# Patient Record
Sex: FEMALE | Race: WHITE | NOT HISPANIC OR LATINO | Employment: OTHER | URBAN - METROPOLITAN AREA
[De-identification: names, ages, dates, MRNs, and addresses within clinical notes are randomized per-mention and may not be internally consistent; named-entity substitution may affect disease eponyms.]

---

## 2017-03-01 ENCOUNTER — ALLSCRIPTS OFFICE VISIT (OUTPATIENT)
Dept: OTHER | Facility: OTHER | Age: 67
End: 2017-03-01

## 2017-05-06 ENCOUNTER — APPOINTMENT (OUTPATIENT)
Dept: LAB | Facility: CLINIC | Age: 67
End: 2017-05-06
Payer: MEDICARE

## 2017-05-06 ENCOUNTER — TRANSCRIBE ORDERS (OUTPATIENT)
Dept: LAB | Facility: CLINIC | Age: 67
End: 2017-05-06

## 2017-05-06 DIAGNOSIS — E03.9 HYPOTHYROIDISM: ICD-10-CM

## 2017-05-06 LAB
ALBUMIN SERPL BCP-MCNC: 3.8 G/DL (ref 3.5–5)
ALP SERPL-CCNC: 48 U/L (ref 46–116)
ALT SERPL W P-5'-P-CCNC: 32 U/L (ref 12–78)
ANION GAP SERPL CALCULATED.3IONS-SCNC: 8 MMOL/L (ref 4–13)
AST SERPL W P-5'-P-CCNC: 16 U/L (ref 5–45)
BILIRUB SERPL-MCNC: 0.5 MG/DL (ref 0.2–1)
BUN SERPL-MCNC: 18 MG/DL (ref 5–25)
CALCIUM SERPL-MCNC: 8.9 MG/DL (ref 8.3–10.1)
CHLORIDE SERPL-SCNC: 105 MMOL/L (ref 100–108)
CHOLEST SERPL-MCNC: 190 MG/DL (ref 50–200)
CO2 SERPL-SCNC: 30 MMOL/L (ref 21–32)
CREAT SERPL-MCNC: 0.75 MG/DL (ref 0.6–1.3)
CREAT UR-MCNC: 103 MG/DL
ERYTHROCYTE [DISTWIDTH] IN BLOOD BY AUTOMATED COUNT: 13.3 % (ref 11.6–15.1)
GFR SERPL CREATININE-BSD FRML MDRD: >60 ML/MIN/1.73SQ M
GLUCOSE P FAST SERPL-MCNC: 90 MG/DL (ref 65–99)
HCT VFR BLD AUTO: 42.9 % (ref 34.8–46.1)
HDLC SERPL-MCNC: 64 MG/DL (ref 40–60)
HGB BLD-MCNC: 14.4 G/DL (ref 11.5–15.4)
LDLC SERPL CALC-MCNC: 110 MG/DL (ref 0–100)
MCH RBC QN AUTO: 31.4 PG (ref 26.8–34.3)
MCHC RBC AUTO-ENTMCNC: 33.6 G/DL (ref 31.4–37.4)
MCV RBC AUTO: 94 FL (ref 82–98)
MICROALBUMIN UR-MCNC: 7.7 MG/L (ref 0–20)
MICROALBUMIN/CREAT 24H UR: 7 MG/G CREATININE (ref 0–30)
PLATELET # BLD AUTO: 249 THOUSANDS/UL (ref 149–390)
PMV BLD AUTO: 9 FL (ref 8.9–12.7)
POTASSIUM SERPL-SCNC: 4.1 MMOL/L (ref 3.5–5.3)
PROT SERPL-MCNC: 7 G/DL (ref 6.4–8.2)
RBC # BLD AUTO: 4.59 MILLION/UL (ref 3.81–5.12)
SODIUM SERPL-SCNC: 143 MMOL/L (ref 136–145)
TRIGL SERPL-MCNC: 78 MG/DL
TSH SERPL DL<=0.05 MIU/L-ACNC: 3.29 UIU/ML (ref 0.36–3.74)
WBC # BLD AUTO: 4.9 THOUSAND/UL (ref 4.31–10.16)

## 2017-05-06 PROCEDURE — 84443 ASSAY THYROID STIM HORMONE: CPT

## 2017-05-06 PROCEDURE — 36415 COLL VENOUS BLD VENIPUNCTURE: CPT

## 2017-05-06 PROCEDURE — 82043 UR ALBUMIN QUANTITATIVE: CPT

## 2017-05-06 PROCEDURE — 82570 ASSAY OF URINE CREATININE: CPT

## 2017-05-06 PROCEDURE — 80061 LIPID PANEL: CPT

## 2017-05-06 PROCEDURE — 80053 COMPREHEN METABOLIC PANEL: CPT

## 2017-05-06 PROCEDURE — 85027 COMPLETE CBC AUTOMATED: CPT

## 2017-05-09 ENCOUNTER — ALLSCRIPTS OFFICE VISIT (OUTPATIENT)
Dept: OTHER | Facility: OTHER | Age: 67
End: 2017-05-09

## 2017-06-19 ENCOUNTER — ALLSCRIPTS OFFICE VISIT (OUTPATIENT)
Dept: OTHER | Facility: OTHER | Age: 67
End: 2017-06-19

## 2017-07-28 ENCOUNTER — GENERIC CONVERSION - ENCOUNTER (OUTPATIENT)
Dept: OTHER | Facility: OTHER | Age: 67
End: 2017-07-28

## 2017-11-01 DIAGNOSIS — E03.9 HYPOTHYROIDISM: ICD-10-CM

## 2017-11-01 DIAGNOSIS — I10 ESSENTIAL (PRIMARY) HYPERTENSION: ICD-10-CM

## 2017-11-01 DIAGNOSIS — E78.5 HYPERLIPIDEMIA: ICD-10-CM

## 2018-01-09 NOTE — MISCELLANEOUS
Message  GI Reminder Recall Citlaly Mcdonnell:   Date: 07/28/2017   Dear Sánchez ROMAN:     Review of our records shows you are due for the following: CONSULT  Please call the following office to schedule your appointment:   [address] [facility], [facility] [address] [place] [address], [address] [place] [address]  [phone]  We look forward to hearing from you!      Sincerely,     [facility]      Signatures   Electronically signed by : Charlette Marquez, ; Jul 28 2017 10:27AM EST                       (Author)

## 2018-01-13 VITALS
RESPIRATION RATE: 16 BRPM | DIASTOLIC BLOOD PRESSURE: 80 MMHG | HEIGHT: 63 IN | WEIGHT: 182 LBS | SYSTOLIC BLOOD PRESSURE: 132 MMHG | HEART RATE: 72 BPM | BODY MASS INDEX: 32.25 KG/M2 | TEMPERATURE: 97.8 F

## 2018-01-14 VITALS
HEART RATE: 84 BPM | DIASTOLIC BLOOD PRESSURE: 76 MMHG | RESPIRATION RATE: 18 BRPM | BODY MASS INDEX: 32.6 KG/M2 | WEIGHT: 184 LBS | HEIGHT: 63 IN | TEMPERATURE: 98.1 F | SYSTOLIC BLOOD PRESSURE: 128 MMHG

## 2018-01-14 VITALS
BODY MASS INDEX: 32.78 KG/M2 | RESPIRATION RATE: 16 BRPM | OXYGEN SATURATION: 98 % | HEART RATE: 86 BPM | DIASTOLIC BLOOD PRESSURE: 70 MMHG | HEIGHT: 63 IN | WEIGHT: 185 LBS | SYSTOLIC BLOOD PRESSURE: 120 MMHG

## 2018-01-15 NOTE — RESULT NOTES
Verified Results  (1) TSH [id number] 07:29AM Subhash Guerrero Order Number: [id number]     Test Name Result Flag Reference   TSH 2 267 uIU/mL  0 358-3 740   - Patient Instructions: This bloodwork is non-fasting  Please drink two glasses of water morning of bloodwork  - Patient Instructions: This bloodwork is non-fasting  Please drink two glasses of water morning of bloodwork  Patients undergoing fluorescein dye angiography may retain small amounts of fluorescein in the body for 48-72 hours post procedure  Samples containing fluorescein can produce falsely depressed TSH values  If the patient had this procedure,a specimen should be resubmitted post fluorescein clearance            The recommended reference ranges for TSH during pregnancy are as follows:  First trimester 0 1 to 2 5 uIU/mL  Second trimester  0 2 to 3 0 uIU/mL  Third trimester 0 3 to 3 0 uIU/m       Discussion/Summary   Normal lab

## 2018-01-15 NOTE — RESULT NOTES
Verified Results  (1) CBC/ PLT (NO DIFF) [id number] 07:17AM Alison Singerh     Test Name Result Flag Reference   HEMATOCRIT 42 5 %  34 8-46 1   HEMOGLOBIN 14 6 g/dL  11 5-15 4   MCHC 34 4 g/dL  31 4-37 4   MCH 32 4 pg  26 8-34 3   MCV 94 fL  82-98   PLATELET COUNT 326 Thousands/uL  149-390   RBC COUNT 4 50 Million/uL  3 81-5 12   RDW 13 2 %  11 6-15 1   WBC COUNT 4 49 Thousand/uL  4 31-10 16   MPV 8 7 fL L 8 9-12 7     (1) COMPREHENSIVE METABOLIC PANEL [id number] 19:06AT Alison Singerh     Test Name Result Flag Reference   GLUCOSE,RANDM 88 mg/dL     If the patient is fasting, the ADA then defines impaired fasting glucose as > 100 mg/dL and diabetes as > or equal to 123 mg/dL  SODIUM 143 mmol/L  136-145   POTASSIUM 3 8 mmol/L  3 5-5 3   CHLORIDE 104 mmol/L  100-108   CARBON DIOXIDE 32 mmol/L  21-32   ANION GAP (CALC) 7 mmol/L  4-13   BLOOD UREA NITROGEN 13 mg/dL  5-25   CREATININE 0 95 mg/dL  0 60-1 30   Standardized to IDMS reference method   CALCIUM 9 3 mg/dL  8 3-10 1   BILI, TOTAL 0 50 mg/dL  0 20-1 00   ALK PHOSPHATAS 50 U/L     ALT (SGPT) 39 U/L  12-78   AST(SGOT) 22 U/L  5-45   ALBUMIN 3 9 g/dL  3 5-5 0   TOTAL PROTEIN 7 0 g/dL  6 4-8 2   eGFR Non-African American 59 0 ml/min/1 73sq [facility] Disease Education Program recommendations are as follows:  GFR calculation is accurate only with a steady state creatinine  Chronic Kidney disease less than 60 ml/min/1 73 sq  meters  Kidney failure less than 15 ml/min/1 73 sq  meters  (1) LIPID PANEL, FASTING [id number] 07:17AM Alison Singerh     Test Name Result Flag Reference   CHOLESTEROL 207 mg/dL H    HDL,DIRECT 74 mg/dL H 40-60   Specimen collection should occur prior to Metamizole administration due to the potential for falsely depressed results     LDL CHOLESTEROL CALCULATED 112 mg/dL H 0-100   Triglyceride:         Normal              <150 mg/dl       Borderline High    150-199 mg/dl       High               200-499 mg/dl       Very High          >499 mg/dl  Cholesterol:         Desirable        <200 mg/dl      Borderline High  200-239 mg/dl      High             >239 mg/dl  HDL Cholesterol:        High    >59 mg/dL      Low     <41 mg/dL  LDL CALCULATED:    This screening LDL is a calculated result  It does not have the accuracy of the Direct Measured LDL in the monitoring of patients with hyperlipidemia and/or statin therapy  Direct Measure LDL (HWQ031) must be ordered separately in these patients  TRIGLYCERIDES 105 mg/dL  <=150   Specimen collection should occur prior to N-Acetylcysteine or Metamizole administration due to the potential for falsely depressed results  (1) TSH [id number] 07:17AM Dorian Greco     Test Name Result Flag Reference   TSH 4 656 uIU/mL H 0 358-3 740   Patients undergoing fluorescein dye angiography may retain small amounts of fluorescein in the body for 48-72 hours post procedure  Samples containing fluorescein can produce falsely depressed TSH values  If the patient had this procedure,a specimen should be resubmitted post fluorescein clearance  The recommended reference ranges for TSH during pregnancy are as follows:  First trimester 0 1 to 2 5 uIU/mL  Second trimester  0 2 to 3 0 uIU/mL  Third trimester 0 3 to 3 0 uIU/m     (1) VITAMIN D 25-HYDROXY [id number] 07:17AM Carina Lafleur     Test Name Result Flag Reference   VIT D 25-HYDROX 37 5 ng/mL  30 0-100 0   This assay is a certified procedure of the CDC Vitamin D Standardization Certification Program (VDSCP)     Deficiency <20ng/ml   Insufficiency 20-30ng/ml   Sufficient  ng/ml     *Patients undergoing fluorescein dye angiography may retain small amounts of fluorescein in the body for 48-72 hours post procedure  Samples containing fluorescein can produce falsely elevated Vitamin D values  If the patient had this procedure, a specimen should be resubmitted post fluorescein clearance

## 2018-01-17 NOTE — RESULT NOTES
Verified Results  * DXA BONE DENSITY SPINE HIP AND PELVIS [id number] 11:32AM Armani Verma     Test Name Result Flag Reference   DXA BONE DENSITY SPINE HIP AND PELVIS (Report)     CENTRAL DXA SCAN     CLINICAL HISTORY: 60-year-old woman  Menopause at age 64  OTHER RISK FACTORS: History of ankle fracture following a low level of injury  TECHNIQUE: Bone densitometry was performed using a [email or web address] bone densitometer  Regions of interest appear properly placed  There are no prior DXA studies performed on this unit for comparison  COMPARISON: None     RESULTS:      LUMBAR SPINE L1-L4 (L3): BMD 1 163 gm/cm2 / T-score -0 2 / Z score 1 4     LEFT TOTAL HIP: BMD 1 052 gm/cm2 / T-score 0 4 / Z score 1 6   LEFT FEMORAL NECK: BMD 0 914 gm/cm2 / T score -0 9 / Z score 0 6     RIGHT TOTAL HIP: BMD 1 091 gm/cm2 / T-score 0 7 / Z score 1 9   RIGHT FEMORAL NECK: BMD 0 905 gm/cm2 / T score -1 0 / Z score 0 5     ASSESSMENT:   1  Normal bone mineral density  2  A daily intake of at least 1200 mg calcium and vitamin D 800- 1000 IU, as well as weight bearing and muscle strengthening exercise, fall prevention and avoidance of tobacco and excessive alcohol as preventive measurements are suggested  3   Follow-up DXA in two years is recommended for most patients  A one year follow-up DXA is recommended after initiation or change in therapy for osteoporosis  More frequent evaluation is also appropriate for patients with conditions associated with    rapid bone loss, such as glucocorticoid therapy         WHO CLASSIFICATION:   Normal (a T-score of -1 0 or higher)   Low bone mineral density (a T-score of less than -1 0 but higher than -2 5)   Osteoporosis (a T-score of -2 5 or less)   Severe osteoporosis (a T-score of -2 5 or less with a fragility fracture)       Workstation performed: [id number]       Discussion/Summary   normal dexa

## 2018-05-31 ENCOUNTER — OFFICE VISIT (OUTPATIENT)
Dept: FAMILY MEDICINE CLINIC | Facility: CLINIC | Age: 68
End: 2018-05-31
Payer: MEDICARE

## 2018-05-31 VITALS
RESPIRATION RATE: 18 BRPM | HEART RATE: 94 BPM | TEMPERATURE: 98.6 F | HEIGHT: 63 IN | SYSTOLIC BLOOD PRESSURE: 138 MMHG | OXYGEN SATURATION: 97 % | DIASTOLIC BLOOD PRESSURE: 82 MMHG | WEIGHT: 187 LBS | BODY MASS INDEX: 33.13 KG/M2

## 2018-05-31 DIAGNOSIS — J06.9 ACUTE UPPER RESPIRATORY INFECTION: ICD-10-CM

## 2018-05-31 DIAGNOSIS — J06.9 VIRAL UPPER RESPIRATORY TRACT INFECTION: Primary | ICD-10-CM

## 2018-05-31 DIAGNOSIS — E03.9 HYPOTHYROIDISM, UNSPECIFIED TYPE: ICD-10-CM

## 2018-05-31 PROCEDURE — 99214 OFFICE O/P EST MOD 30 MIN: CPT | Performed by: FAMILY MEDICINE

## 2018-05-31 RX ORDER — CODEINE PHOSPHATE AND GUAIFENESIN 10; 100 MG/5ML; MG/5ML
SOLUTION ORAL
COMMUNITY
Start: 2017-06-19 | End: 2018-05-31 | Stop reason: ALTCHOICE

## 2018-05-31 RX ORDER — AMOXICILLIN 500 MG/1
500 CAPSULE ORAL EVERY 8 HOURS SCHEDULED
Qty: 21 CAPSULE | Refills: 0 | Status: SHIPPED | OUTPATIENT
Start: 2018-05-31 | End: 2018-06-07

## 2018-05-31 RX ORDER — FLUTICASONE PROPIONATE 50 MCG
1 SPRAY, SUSPENSION (ML) NASAL 2 TIMES DAILY
COMMUNITY
Start: 2017-03-01 | End: 2018-05-31 | Stop reason: ALTCHOICE

## 2018-05-31 RX ORDER — MELATONIN
COMMUNITY
End: 2018-05-31 | Stop reason: ALTCHOICE

## 2018-05-31 RX ORDER — HYDROCHLOROTHIAZIDE 25 MG/1
1 TABLET ORAL DAILY
COMMUNITY
Start: 2015-10-15 | End: 2018-05-31 | Stop reason: ALTCHOICE

## 2018-05-31 RX ORDER — B-COMPLEX WITH VITAMIN C
TABLET ORAL
COMMUNITY
End: 2018-05-31 | Stop reason: ALTCHOICE

## 2018-05-31 RX ORDER — SIMVASTATIN 20 MG
1 TABLET ORAL DAILY
COMMUNITY
Start: 2012-10-12 | End: 2018-05-31 | Stop reason: ALTCHOICE

## 2018-05-31 RX ORDER — CODEINE PHOSPHATE AND GUAIFENESIN 10; 100 MG/5ML; MG/5ML
5 SOLUTION ORAL 3 TIMES DAILY PRN
Qty: 118 ML | Refills: 0 | Status: SHIPPED | OUTPATIENT
Start: 2018-05-31 | End: 2019-02-04

## 2018-05-31 RX ORDER — LEVOTHYROXINE SODIUM 0.05 MG/1
TABLET ORAL
COMMUNITY
Start: 2016-09-26 | End: 2018-05-31 | Stop reason: ALTCHOICE

## 2018-05-31 NOTE — ASSESSMENT & PLAN NOTE
She did not go for labs and she was not given the refills, she will do her blood work and then will restart her on medication advised to have follow-up in 1-2 weeks after the labs

## 2018-05-31 NOTE — ASSESSMENT & PLAN NOTE
Most likely viral URI but has her cough is getting worse and she is feeling very fatigue I will start her on amoxicillin and cough medicine as needed

## 2018-05-31 NOTE — PROGRESS NOTES
Assessment/Plan:    Problem List Items Addressed This Visit        Endocrine    Hypothyroidism     She did not go for labs and she was not given the refills, she will do her blood work and then will restart her on medication advised to have follow-up in 1-2 weeks after the labs         Relevant Orders    CBC and differential    Comprehensive metabolic panel    Lipid panel    TSH, 3rd generation       Respiratory    Acute upper respiratory infection     Most likely viral URI but has her cough is getting worse and she is feeling very fatigue I will start her on amoxicillin and cough medicine as needed         Relevant Medications    amoxicillin (AMOXIL) 500 mg capsule      Other Visit Diagnoses     Viral upper respiratory tract infection    -  Primary    Relevant Medications    guaiFENesin-codeine (ROBITUSSIN AC) 100-10 mg/5 mL oral solution    amoxicillin (AMOXIL) 500 mg capsule          Chief Complaint   Patient presents with    Cough    Nasal Congestion    Follow-up       Subjective:   Patient ID: Paul Gustafson is a 79 y o  female  She complain of sore throat nasal congestion and cough for last 5 days and she says sore throat is better but the cough is not getting better she has no phlegm or sometimes clear phlegm and her nasal secretions are clear  She denies fever but she has lot of sweats  No breathing difficulty  And denies any nausea vomiting or diarrhea  She has decreased appetite and fluid intake and she has been feeling tired  She says she stopped all her medication because they were not refilled she is out of thyroid medicine for last 1 week  She says she did not go for blood work since last year  Review of Systems   Constitutional: Negative for activity change, appetite change, chills, diaphoresis, fatigue, fever and unexpected weight change  HENT: Positive for congestion and rhinorrhea   Negative for dental problem, ear discharge, ear pain, facial swelling, hearing loss, mouth sores, nosebleeds, postnasal drip, sinus pain, sinus pressure, sneezing, sore throat, trouble swallowing and voice change  Eyes: Negative for photophobia, pain, discharge, redness and itching  Respiratory: Negative for cough, chest tightness, shortness of breath and wheezing  Cardiovascular: Negative for chest pain, palpitations and leg swelling  Gastrointestinal: Negative for abdominal distention, abdominal pain, blood in stool, constipation, diarrhea and nausea  Endocrine: Negative for cold intolerance, heat intolerance, polydipsia, polyphagia and polyuria  Genitourinary: Negative for dysuria, flank pain, frequency, hematuria and urgency  Musculoskeletal: Negative for arthralgias, back pain, myalgias and neck pain  Skin: Negative for color change and pallor  Allergic/Immunologic: Negative for environmental allergies and food allergies  Neurological: Negative for dizziness, weakness, light-headedness, numbness and headaches  Hematological: Negative for adenopathy  Does not bruise/bleed easily  Psychiatric/Behavioral: Negative for behavioral problems, sleep disturbance and suicidal ideas  The patient is not nervous/anxious  Objective:  Physical Exam   Constitutional: She appears well-developed and well-nourished  HENT:   Head: Normocephalic and atraumatic  Right Ear: External ear normal    Left Ear: External ear normal    Mouth/Throat: Oropharynx is clear and moist    Eyes: Conjunctivae and EOM are normal  Pupils are equal, round, and reactive to light  No scleral icterus  Neck: Normal range of motion  Neck supple  No thyromegaly present  Cardiovascular: Normal rate, regular rhythm and normal heart sounds  Pulmonary/Chest: Effort normal and breath sounds normal  She has no wheezes  She has no rales  Lymphadenopathy:     She has no cervical adenopathy  Neurological: She is alert  Skin: No rash noted  No erythema  Psychiatric: She has a normal mood and affect  Nursing note and vitals reviewed           Current Outpatient Prescriptions:     amoxicillin (AMOXIL) 500 mg capsule, Take 1 capsule (500 mg total) by mouth every 8 (eight) hours for 7 days, Disp: 21 capsule, Rfl: 0    guaiFENesin-codeine (ROBITUSSIN AC) 100-10 mg/5 mL oral solution, Take 5 mL by mouth 3 (three) times a day as needed for cough, Disp: 118 mL, Rfl: 0    No Known Allergies    Vitals:    05/31/18 1430   BP: 138/82   Pulse: 94   Resp: 18   Temp: 98 6 °F (37 °C)   SpO2: 97%   Weight: 84 8 kg (187 lb)   Height: 5' 3" (1 6 m)

## 2018-07-11 ENCOUNTER — LAB (OUTPATIENT)
Dept: LAB | Facility: CLINIC | Age: 68
End: 2018-07-11
Payer: MEDICARE

## 2018-07-11 DIAGNOSIS — E03.9 HYPOTHYROIDISM, UNSPECIFIED TYPE: ICD-10-CM

## 2018-07-11 LAB
ALBUMIN SERPL BCP-MCNC: 4 G/DL (ref 3.5–5)
ALP SERPL-CCNC: 63 U/L (ref 46–116)
ALT SERPL W P-5'-P-CCNC: 34 U/L (ref 12–78)
ANION GAP SERPL CALCULATED.3IONS-SCNC: 7 MMOL/L (ref 4–13)
AST SERPL W P-5'-P-CCNC: 17 U/L (ref 5–45)
BASOPHILS # BLD AUTO: 0.01 THOUSANDS/ΜL (ref 0–0.1)
BASOPHILS NFR BLD AUTO: 0 % (ref 0–1)
BILIRUB SERPL-MCNC: 0.7 MG/DL (ref 0.2–1)
BUN SERPL-MCNC: 15 MG/DL (ref 5–25)
CALCIUM SERPL-MCNC: 9.1 MG/DL (ref 8.3–10.1)
CHLORIDE SERPL-SCNC: 105 MMOL/L (ref 100–108)
CHOLEST SERPL-MCNC: 293 MG/DL (ref 50–200)
CO2 SERPL-SCNC: 28 MMOL/L (ref 21–32)
CREAT SERPL-MCNC: 0.85 MG/DL (ref 0.6–1.3)
EOSINOPHIL # BLD AUTO: 0.15 THOUSAND/ΜL (ref 0–0.61)
EOSINOPHIL NFR BLD AUTO: 4 % (ref 0–6)
ERYTHROCYTE [DISTWIDTH] IN BLOOD BY AUTOMATED COUNT: 13.1 % (ref 11.6–15.1)
GFR SERPL CREATININE-BSD FRML MDRD: 71 ML/MIN/1.73SQ M
GLUCOSE P FAST SERPL-MCNC: 91 MG/DL (ref 65–99)
HCT VFR BLD AUTO: 44.8 % (ref 34.8–46.1)
HDLC SERPL-MCNC: 67 MG/DL (ref 40–60)
HGB BLD-MCNC: 15.4 G/DL (ref 11.5–15.4)
LDLC SERPL CALC-MCNC: 198 MG/DL (ref 0–100)
LYMPHOCYTES # BLD AUTO: 1.32 THOUSANDS/ΜL (ref 0.6–4.47)
LYMPHOCYTES NFR BLD AUTO: 33 % (ref 14–44)
MCH RBC QN AUTO: 32.4 PG (ref 26.8–34.3)
MCHC RBC AUTO-ENTMCNC: 34.4 G/DL (ref 31.4–37.4)
MCV RBC AUTO: 94 FL (ref 82–98)
MONOCYTES # BLD AUTO: 0.43 THOUSAND/ΜL (ref 0.17–1.22)
MONOCYTES NFR BLD AUTO: 11 % (ref 4–12)
NEUTROPHILS # BLD AUTO: 2.12 THOUSANDS/ΜL (ref 1.85–7.62)
NEUTS SEG NFR BLD AUTO: 52 % (ref 43–75)
NONHDLC SERPL-MCNC: 226 MG/DL
PLATELET # BLD AUTO: 252 THOUSANDS/UL (ref 149–390)
PMV BLD AUTO: 9.2 FL (ref 8.9–12.7)
POTASSIUM SERPL-SCNC: 4.1 MMOL/L (ref 3.5–5.3)
PROT SERPL-MCNC: 7.4 G/DL (ref 6.4–8.2)
RBC # BLD AUTO: 4.75 MILLION/UL (ref 3.81–5.12)
SODIUM SERPL-SCNC: 140 MMOL/L (ref 136–145)
TRIGL SERPL-MCNC: 141 MG/DL
TSH SERPL DL<=0.05 MIU/L-ACNC: 4.75 UIU/ML (ref 0.36–3.74)
WBC # BLD AUTO: 4.03 THOUSAND/UL (ref 4.31–10.16)

## 2018-07-11 PROCEDURE — 80053 COMPREHEN METABOLIC PANEL: CPT

## 2018-07-11 PROCEDURE — 84443 ASSAY THYROID STIM HORMONE: CPT

## 2018-07-11 PROCEDURE — 36415 COLL VENOUS BLD VENIPUNCTURE: CPT

## 2018-07-11 PROCEDURE — 80061 LIPID PANEL: CPT

## 2018-07-11 PROCEDURE — 85025 COMPLETE CBC W/AUTO DIFF WBC: CPT

## 2018-07-12 ENCOUNTER — TELEPHONE (OUTPATIENT)
Dept: FAMILY MEDICINE CLINIC | Facility: CLINIC | Age: 68
End: 2018-07-12

## 2018-07-12 DIAGNOSIS — E78.5 HYPERLIPIDEMIA, UNSPECIFIED HYPERLIPIDEMIA TYPE: ICD-10-CM

## 2018-07-12 DIAGNOSIS — E03.9 HYPOTHYROIDISM, UNSPECIFIED TYPE: Primary | ICD-10-CM

## 2018-07-12 RX ORDER — LEVOTHYROXINE SODIUM 0.03 MG/1
25 TABLET ORAL DAILY
Qty: 90 TABLET | Refills: 1 | Status: SHIPPED | OUTPATIENT
Start: 2018-07-12 | End: 2018-07-16 | Stop reason: SDUPTHER

## 2018-07-12 RX ORDER — SIMVASTATIN 40 MG
40 TABLET ORAL
Qty: 90 TABLET | Refills: 1 | Status: SHIPPED | OUTPATIENT
Start: 2018-07-12 | End: 2019-02-04 | Stop reason: SDUPTHER

## 2018-07-12 NOTE — TELEPHONE ENCOUNTER
Please advise along with blood word info because I dont think she called back in regards to this yet

## 2018-07-12 NOTE — Clinical Note
I spoke with the patient and I will restart her on levothyroxine and simvastatin and I printed a prescription [facility], And I ordered labs for 3 months please mail to her,

## 2018-07-12 NOTE — TELEPHONE ENCOUNTER
Discussed the result with the patient and restarted her levothyroxine and simvastatin, I increased the simvastatin dose as LDL is very high and levothyroxine I will give her 25 microgram and order new labs for 3 months

## 2018-07-12 NOTE — TELEPHONE ENCOUNTER
Patient calling said that she needs for Dr Dafne Robb to call her  I asked her what it was in reference to  She said that its about medication  I asked what medication and she said that is the problem she hasn't discussed the options with her yet  I advised I would send Dr Dafne Robb a message

## 2018-07-12 NOTE — PROGRESS NOTES
Spoke with the patient and advised to start her medicine soon she is not taking it for last 6 months    Advised low-fat diet and exercise and lose weight

## 2018-07-16 DIAGNOSIS — E03.9 HYPOTHYROIDISM, UNSPECIFIED TYPE: ICD-10-CM

## 2018-07-16 RX ORDER — LEVOTHYROXINE SODIUM 0.03 MG/1
25 TABLET ORAL DAILY
Qty: 90 TABLET | Refills: 0 | Status: SHIPPED | OUTPATIENT
Start: 2018-07-16 | End: 2019-02-04 | Stop reason: SDUPTHER

## 2019-01-15 ENCOUNTER — TELEPHONE (OUTPATIENT)
Dept: FAMILY MEDICINE CLINIC | Facility: CLINIC | Age: 69
End: 2019-01-15

## 2019-01-15 NOTE — TELEPHONE ENCOUNTER
Patients pharmacy called for refills of patients Simvastatin  And Levothyroxine  Patient was contacted and advised that she is in need of an office visit the last time she was seen was 05/2017  Patient advised she was aware but did not want to schedule an appointment because her  recently had surgery and she has to drive him everywhere  I did advise her then that we could not give her 90 days and with that she began to get loud and stated that's fine I will go without my medication again and abruptly hung up the phone  I was going to explain that we could do 30 days until her she could find a good time to come in and follow up

## 2019-02-04 ENCOUNTER — OFFICE VISIT (OUTPATIENT)
Dept: FAMILY MEDICINE CLINIC | Facility: CLINIC | Age: 69
End: 2019-02-04
Payer: MEDICARE

## 2019-02-04 VITALS
WEIGHT: 191 LBS | OXYGEN SATURATION: 98 % | RESPIRATION RATE: 16 BRPM | HEART RATE: 66 BPM | SYSTOLIC BLOOD PRESSURE: 150 MMHG | HEIGHT: 63 IN | DIASTOLIC BLOOD PRESSURE: 100 MMHG | BODY MASS INDEX: 33.84 KG/M2

## 2019-02-04 DIAGNOSIS — Z13.6 SCREENING FOR CARDIOVASCULAR CONDITION: ICD-10-CM

## 2019-02-04 DIAGNOSIS — I10 BENIGN ESSENTIAL HYPERTENSION: ICD-10-CM

## 2019-02-04 DIAGNOSIS — Z12.31 ENCOUNTER FOR SCREENING MAMMOGRAM FOR BREAST CANCER: ICD-10-CM

## 2019-02-04 DIAGNOSIS — E78.5 HYPERLIPIDEMIA, UNSPECIFIED HYPERLIPIDEMIA TYPE: ICD-10-CM

## 2019-02-04 DIAGNOSIS — Z12.11 SCREENING FOR COLON CANCER: Primary | ICD-10-CM

## 2019-02-04 DIAGNOSIS — E78.2 MIXED HYPERLIPIDEMIA: ICD-10-CM

## 2019-02-04 DIAGNOSIS — E03.9 HYPOTHYROIDISM, UNSPECIFIED TYPE: ICD-10-CM

## 2019-02-04 PROBLEM — J06.9 ACUTE UPPER RESPIRATORY INFECTION: Status: RESOLVED | Noted: 2017-06-19 | Resolved: 2019-02-04

## 2019-02-04 PROCEDURE — 99214 OFFICE O/P EST MOD 30 MIN: CPT | Performed by: FAMILY MEDICINE

## 2019-02-04 RX ORDER — LEVOTHYROXINE SODIUM 0.03 MG/1
25 TABLET ORAL DAILY
Qty: 90 TABLET | Refills: 0 | Status: SHIPPED | OUTPATIENT
Start: 2019-02-04 | End: 2019-02-04 | Stop reason: SDUPTHER

## 2019-02-04 RX ORDER — LEVOTHYROXINE SODIUM 0.03 MG/1
25 TABLET ORAL DAILY
Qty: 90 TABLET | Refills: 1 | Status: SHIPPED | OUTPATIENT
Start: 2019-02-04 | End: 2019-11-26 | Stop reason: SDUPTHER

## 2019-02-04 RX ORDER — SIMVASTATIN 40 MG
40 TABLET ORAL
Qty: 90 TABLET | Refills: 1 | Status: SHIPPED | OUTPATIENT
Start: 2019-02-04 | End: 2019-11-26 | Stop reason: SDUPTHER

## 2019-02-04 RX ORDER — SIMVASTATIN 40 MG
40 TABLET ORAL
Qty: 90 TABLET | Refills: 1 | Status: SHIPPED | OUTPATIENT
Start: 2019-02-04 | End: 2019-02-04 | Stop reason: SDUPTHER

## 2019-02-04 NOTE — ASSESSMENT & PLAN NOTE
Recheck blood pressure is high today, she does not check at home advised to come back in a week to be recheck as her initial blood pressure was not high when she came in

## 2019-02-04 NOTE — PROGRESS NOTES
Assessment/Plan:    Problem List Items Addressed This Visit        Endocrine    Hypothyroidism     She is due for labs, advised to have labs and follow-up in a week and refill of medicine given         Relevant Medications    levothyroxine 25 mcg tablet    Other Relevant Orders    TSH, 3rd generation       Cardiovascular and Mediastinum    Benign essential hypertension     Recheck blood pressure is high today, she does not check at home advised to come back in a week to be recheck as her initial blood pressure was not high when she came in            Other    Hyperlipidemia     She is due for labs, continue statins and labs ordered, she will follow up in a week         Relevant Medications    simvastatin (ZOCOR) 40 mg tablet    Other Relevant Orders    CBC and differential    Comprehensive metabolic panel    Lipid panel    Screening for cardiovascular condition - Primary     She is due for annual wellness visit               Chief Complaint   Patient presents with    Follow-up       Subjective:   Patient ID: Ruthann Bell is a 76 y o  female  She is here for follow-up, she did not have labs for long time and she does not come back routinely for regular checkups, last time was seen for URI symptoms a year ago , in the past she says her blood pressure medicine was stopped as her blood pressure used to be low, and she has not been taking it for 1 year  She is also out of medication for levothyroxine and statins for 2 weeks and she is here for refill  Overall she feels fine denies any headache chest pain shortness of breath        Review of Systems   Constitutional: Negative for activity change, appetite change, chills, diaphoresis, fatigue, fever and unexpected weight change     HENT: Negative for congestion, dental problem, ear discharge, ear pain, facial swelling, hearing loss, mouth sores, nosebleeds, postnasal drip, rhinorrhea, sinus pain, sinus pressure, sneezing, sore throat, trouble swallowing and voice change  Eyes: Negative for photophobia, pain, discharge, redness and itching  Respiratory: Negative for cough, chest tightness, shortness of breath and wheezing  Cardiovascular: Negative for chest pain, palpitations and leg swelling  Gastrointestinal: Negative for abdominal distention, abdominal pain, blood in stool, constipation, diarrhea and nausea  Endocrine: Negative for cold intolerance, heat intolerance, polydipsia, polyphagia and polyuria  Genitourinary: Negative for dysuria, flank pain, frequency, hematuria and urgency  Musculoskeletal: Negative for arthralgias, back pain, myalgias and neck pain  Skin: Negative for color change and pallor  Allergic/Immunologic: Negative for environmental allergies and food allergies  Neurological: Negative for dizziness, weakness, light-headedness, numbness and headaches  Hematological: Negative for adenopathy  Does not bruise/bleed easily  Psychiatric/Behavioral: Negative for behavioral problems, sleep disturbance and suicidal ideas  The patient is not nervous/anxious  Objective:  Physical Exam   Constitutional: She is oriented to person, place, and time  She appears well-developed and well-nourished  HENT:   Head: Normocephalic and atraumatic  Right Ear: External ear normal    Left Ear: External ear normal    Nose: Nose normal    Mouth/Throat: Oropharynx is clear and moist  No oropharyngeal exudate  Eyes: Pupils are equal, round, and reactive to light  Conjunctivae are normal  Right eye exhibits no discharge  Left eye exhibits no discharge  No scleral icterus  Neck: Normal range of motion  Neck supple  No tracheal deviation present  No thyromegaly present  Cardiovascular: Normal rate, regular rhythm and normal heart sounds  No murmur heard  Pulmonary/Chest: Effort normal and breath sounds normal  No respiratory distress  She has no wheezes  She has no rales  Abdominal: Soft   Bowel sounds are normal  She exhibits no distension and no mass  There is no tenderness  There is no rebound  Musculoskeletal: Normal range of motion  She exhibits no edema  Lymphadenopathy:     She has no cervical adenopathy  Neurological: She is alert and oriented to person, place, and time  She has normal reflexes  No cranial nerve deficit  Skin: Skin is warm  No rash noted  No erythema  No pallor  Psychiatric: She has a normal mood and affect  Her behavior is normal  Judgment and thought content normal    Nursing note and vitals reviewed  No past surgical history on file  No family history on file        Current Outpatient Prescriptions:     levothyroxine 25 mcg tablet, Take 1 tablet (25 mcg total) by mouth daily, Disp: 90 tablet, Rfl: 1    simvastatin (ZOCOR) 40 mg tablet, Take 1 tablet (40 mg total) by mouth daily at bedtime, Disp: 90 tablet, Rfl: 1    No Known Allergies    Vitals:    02/04/19 1457 02/04/19 1519   BP: 138/86 150/100   Pulse: 66    Resp: 16    SpO2: 98%    Weight: 86 6 kg (191 lb)    Height: 5' 3" (1 6 m)

## 2019-02-04 NOTE — Clinical Note
Her prescriptions for mail pharmacy are not able to go through prescription, instead they are printed, please fax

## 2019-02-05 ENCOUNTER — APPOINTMENT (OUTPATIENT)
Dept: LAB | Facility: CLINIC | Age: 69
End: 2019-02-05
Payer: MEDICARE

## 2019-02-05 ENCOUNTER — TELEPHONE (OUTPATIENT)
Dept: GASTROENTEROLOGY | Facility: CLINIC | Age: 69
End: 2019-02-05

## 2019-02-05 ENCOUNTER — TELEPHONE (OUTPATIENT)
Dept: GASTROENTEROLOGY | Facility: AMBULARY SURGERY CENTER | Age: 69
End: 2019-02-05

## 2019-02-05 ENCOUNTER — TRANSCRIBE ORDERS (OUTPATIENT)
Dept: LAB | Facility: CLINIC | Age: 69
End: 2019-02-05

## 2019-02-05 DIAGNOSIS — E03.9 HYPOTHYROIDISM, UNSPECIFIED TYPE: ICD-10-CM

## 2019-02-05 DIAGNOSIS — Z12.11 COLON CANCER SCREENING: Primary | ICD-10-CM

## 2019-02-05 DIAGNOSIS — E78.2 MIXED HYPERLIPIDEMIA: ICD-10-CM

## 2019-02-05 LAB
ALBUMIN SERPL BCP-MCNC: 4 G/DL (ref 3.5–5)
ALP SERPL-CCNC: 57 U/L (ref 46–116)
ALT SERPL W P-5'-P-CCNC: 30 U/L (ref 12–78)
ANION GAP SERPL CALCULATED.3IONS-SCNC: 8 MMOL/L (ref 4–13)
AST SERPL W P-5'-P-CCNC: 14 U/L (ref 5–45)
BASOPHILS # BLD AUTO: 0.03 THOUSANDS/ΜL (ref 0–0.1)
BASOPHILS NFR BLD AUTO: 1 % (ref 0–1)
BILIRUB SERPL-MCNC: 0.7 MG/DL (ref 0.2–1)
BUN SERPL-MCNC: 19 MG/DL (ref 5–25)
CALCIUM SERPL-MCNC: 9.3 MG/DL (ref 8.3–10.1)
CHLORIDE SERPL-SCNC: 104 MMOL/L (ref 100–108)
CHOLEST SERPL-MCNC: 275 MG/DL (ref 50–200)
CO2 SERPL-SCNC: 31 MMOL/L (ref 21–32)
CREAT SERPL-MCNC: 0.78 MG/DL (ref 0.6–1.3)
EOSINOPHIL # BLD AUTO: 0.14 THOUSAND/ΜL (ref 0–0.61)
EOSINOPHIL NFR BLD AUTO: 3 % (ref 0–6)
ERYTHROCYTE [DISTWIDTH] IN BLOOD BY AUTOMATED COUNT: 12.9 % (ref 11.6–15.1)
GFR SERPL CREATININE-BSD FRML MDRD: 78 ML/MIN/1.73SQ M
GLUCOSE P FAST SERPL-MCNC: 80 MG/DL (ref 65–99)
HCT VFR BLD AUTO: 46 % (ref 34.8–46.1)
HDLC SERPL-MCNC: 63 MG/DL (ref 40–60)
HGB BLD-MCNC: 15 G/DL (ref 11.5–15.4)
IMM GRANULOCYTES # BLD AUTO: 0.01 THOUSAND/UL (ref 0–0.2)
IMM GRANULOCYTES NFR BLD AUTO: 0 % (ref 0–2)
LDLC SERPL CALC-MCNC: 193 MG/DL (ref 0–100)
LYMPHOCYTES # BLD AUTO: 1.48 THOUSANDS/ΜL (ref 0.6–4.47)
LYMPHOCYTES NFR BLD AUTO: 30 % (ref 14–44)
MCH RBC QN AUTO: 31.1 PG (ref 26.8–34.3)
MCHC RBC AUTO-ENTMCNC: 32.6 G/DL (ref 31.4–37.4)
MCV RBC AUTO: 95 FL (ref 82–98)
MONOCYTES # BLD AUTO: 0.5 THOUSAND/ΜL (ref 0.17–1.22)
MONOCYTES NFR BLD AUTO: 10 % (ref 4–12)
NEUTROPHILS # BLD AUTO: 2.71 THOUSANDS/ΜL (ref 1.85–7.62)
NEUTS SEG NFR BLD AUTO: 56 % (ref 43–75)
NONHDLC SERPL-MCNC: 212 MG/DL
NRBC BLD AUTO-RTO: 0 /100 WBCS
PLATELET # BLD AUTO: 265 THOUSANDS/UL (ref 149–390)
PMV BLD AUTO: 9 FL (ref 8.9–12.7)
POTASSIUM SERPL-SCNC: 4.5 MMOL/L (ref 3.5–5.3)
PROT SERPL-MCNC: 7.4 G/DL (ref 6.4–8.2)
RBC # BLD AUTO: 4.83 MILLION/UL (ref 3.81–5.12)
SODIUM SERPL-SCNC: 143 MMOL/L (ref 136–145)
TRIGL SERPL-MCNC: 95 MG/DL
TSH SERPL DL<=0.05 MIU/L-ACNC: 6.87 UIU/ML (ref 0.36–3.74)
WBC # BLD AUTO: 4.87 THOUSAND/UL (ref 4.31–10.16)

## 2019-02-05 PROCEDURE — 80053 COMPREHEN METABOLIC PANEL: CPT

## 2019-02-05 PROCEDURE — 84443 ASSAY THYROID STIM HORMONE: CPT

## 2019-02-05 PROCEDURE — 36415 COLL VENOUS BLD VENIPUNCTURE: CPT

## 2019-02-05 PROCEDURE — 85025 COMPLETE CBC W/AUTO DIFF WBC: CPT

## 2019-02-05 PROCEDURE — 80061 LIPID PANEL: CPT

## 2019-02-05 PROCEDURE — 1124F ACP DISCUSS-NO DSCNMKR DOCD: CPT | Performed by: INTERNAL MEDICINE

## 2019-02-05 NOTE — TELEPHONE ENCOUNTER
02/05/19  Screened by: Naif Long    Referring Provider michelet    Pre- Screening: There is no height or weight on file to calculate BMI  Has patient been referred for a routine screening Colonoscopy? yes  Is the patient between 39-70 years old? yes    SCHEDULING STAFF   If the patient is between 45yrs-49yrs, please advise patient to confirm benefits/coverage with their insurance company for a routine screening colonoscopy, some insurance carriers will only cover at [id number] or older   If the patient is over 66years old, please schedule an office visit  Do you have any of the following symptoms? Have you had a coronary or vascular stent within the last year? no    Have you had a heart attack or stroke in the last 6 months? no    Have you had intestinal surgery in the last 3 months? no    Do you have problems with:     Do you use:     Have you been hospitalized in the last Month? no    Have you been diagnosed with a bleeding disorder or anemia? no    Have you had chest pain (angina) or breathing problems  (COPD) in the last 3 months? no    Do you have any difficulty walking up a flight of stairs? no    Have you had Kidney failure or insufficiency? no    Have you had heart valve surgery? no    Are you confined to a wheelchair? no    Do you take     Have you been diagnosed with Diabetes or are you taking any   Diabetic medications? no    : If patient answers NO to medical questions, then schedule procedure  If patient answers YES to medical questions, then schedule office appointment  Previous Colonoscopy no  Date and Facility of last colonoscopy?      Comments: [phone], dr walton preferred

## 2019-02-11 ENCOUNTER — OFFICE VISIT (OUTPATIENT)
Dept: FAMILY MEDICINE CLINIC | Facility: CLINIC | Age: 69
End: 2019-02-11
Payer: MEDICARE

## 2019-02-11 VITALS
SYSTOLIC BLOOD PRESSURE: 132 MMHG | WEIGHT: 187 LBS | BODY MASS INDEX: 33.13 KG/M2 | HEIGHT: 63 IN | DIASTOLIC BLOOD PRESSURE: 74 MMHG | OXYGEN SATURATION: 98 % | RESPIRATION RATE: 16 BRPM | HEART RATE: 88 BPM

## 2019-02-11 DIAGNOSIS — I10 BENIGN ESSENTIAL HYPERTENSION: ICD-10-CM

## 2019-02-11 DIAGNOSIS — E78.5 HYPERLIPIDEMIA, UNSPECIFIED HYPERLIPIDEMIA TYPE: ICD-10-CM

## 2019-02-11 DIAGNOSIS — Z00.00 MEDICARE ANNUAL WELLNESS VISIT, SUBSEQUENT: Primary | ICD-10-CM

## 2019-02-11 DIAGNOSIS — E03.9 HYPOTHYROIDISM, UNSPECIFIED TYPE: ICD-10-CM

## 2019-02-11 PROCEDURE — 99214 OFFICE O/P EST MOD 30 MIN: CPT | Performed by: FAMILY MEDICINE

## 2019-02-11 PROCEDURE — G0439 PPPS, SUBSEQ VISIT: HCPCS | Performed by: FAMILY MEDICINE

## 2019-02-11 NOTE — PROGRESS NOTES
Assessment and Plan:    Problem List Items Addressed This Visit        Endocrine    Hypothyroidism    Relevant Orders    CBC and differential    Comprehensive metabolic panel    TSH, 3rd generation       Cardiovascular and Mediastinum    RESOLVED: Benign essential hypertension       Other    Hyperlipidemia    Relevant Orders    Lipid panel    Medicare annual wellness visit, subsequent - Primary        Health Maintenance Due   Topic Date Due    Hepatitis C Screening  1950    Medicare Annual Wellness Visit (AWV)  1950    CRC Screening: Colonoscopy  1950    BMI: Followup Plan  08/13/1968    DTaP,Tdap,and Td Vaccines (1 - Tdap) 08/13/1971    Pneumococcal PPSV23/PCV13 65+ Years / Low and Medium Risk (1 of 2 - PCV13) 08/13/2015    INFLUENZA VACCINE  07/01/2018         HPI:  Abhinav Mascorro is a 76 y o  female here for her Subsequent Wellness Visit  Patient Active Problem List   Diagnosis    Excess or deficiency of vitamin D    Hyperlipidemia    Hypothyroidism    Screening for cardiovascular condition    Screen for colon cancer    Medicare annual wellness visit, subsequent     No past medical history on file  No past surgical history on file  No family history on file  Social History     Tobacco Use   Smoking Status Not on file     Social History     Substance and Sexual Activity   Alcohol Use Not on file      Social History     Substance and Sexual Activity   Drug Use Not on file       Current Outpatient Medications   Medication Sig Dispense Refill    levothyroxine 25 mcg tablet Take 1 tablet (25 mcg total) by mouth daily 90 tablet 1    simvastatin (ZOCOR) 40 mg tablet Take 1 tablet (40 mg total) by mouth daily at bedtime 90 tablet 1     No current facility-administered medications for this visit        No Known Allergies  Immunization History   Administered Date(s) Administered    INFLUENZA 09/09/2015    Influenza Split High Dose Preservative Free IM 09/09/2015       Patient Care Team:  Ricki Zuñiga MD as PCP - Shannon Hidalgo MD    Medicare Screening Tests and Risk Assessments:  Garret Garner is here for her Subsequent Wellness visit  Health Risk Assessment:  Patient rates overall health as very good  Patient feels that their physical health rating is Same  Eyesight was rated as Same  Hearing was rated as Same  Patient feels that their emotional and mental health rating is Much better  Pain experienced by patient in the last 7 days has been Some  Patient's pain rating has been 6/10  Emotional/Mental Health:  Patient has been feeling nervous/anxious  PHQ-9 Depression Screening:    Frequency of the following problems over the past two weeks:      1  Little interest or pleasure in doing things: 0 - not at all      2  Feeling down, depressed, or hopeless: 0 - not at all  PHQ-2 Score: 0          Broken Bones/Falls: Fall Risk Assessment:    In the past year, patient has experienced: History of falling in past year     Number of falls: 2 or more          Bladder/Bowel:  Patient has not leaked urine accidently in the last six months  Patient reports no loss of bowel control  Immunizations:  Patient has not had a flu vaccination within the last year  Patient has not received a pneumonia shot  Patient has not received a shingles shot  Home Safety:  Patient does not have trouble with stairs inside or outside of their home  Patient currently reports that there are no safety hazards present in home, working smoke alarms, working carbon monoxide detectors  Preventative Screenings:   No breast cancer screening performed, no colon cancer screen completed, cholesterol screen completed, glaucoma eye exam completed,     Nutrition:  Current diet: Regular with servings of the following:    Medications:  Patient is not currently taking any over-the-counter supplements  Patient is able to manage medications  Lifestyle Choices:  Patient reports no tobacco use    Patient has not smoked or used tobacco in the past   Patient reports alcohol use  Alcohol use per week: 10  Patient drives a vehicle  Patient wears seat belt  Current level of exercise of physical activity described by patient as: moderate  Activities of Daily Living:  Can get out of bed by his or her self, able to dress self, able to make own meals, able to do own shopping, able to bathe self, can do own laundry/housekeeping, can manage own money, pay bills and track expenses    Previous Hospitalizations:  No hospitalization or ED visit in past 12 months        Advanced Directives:  Patient has not decided on power of   Patient has not completed advanced directive  Preventative Screening/Counseling:      Cardiovascular:      General: Risks and Benefits Discussed and Screening Current      Counseling: Healthy Diet          Diabetes:      General: Risks and Benefits Discussed and Screening Current      Counseling: Healthy Diet and Improve Physical Activity          Colorectal Cancer:      General: Risks and Benefits Discussed      Due for studies: Colonoscopy - Low Risk      Comments: She  is scheduled for colonoscopy        Breast Cancer:      General: Risks and Benefits Discussed      Comments: She plan to get mammogram this year        Cervical Cancer:      General: Screening Not Indicated          Osteoporosis:      General: Risks and Benefits Discussed      Counseling: Calcium and Vitamin D Intake and Regular Weightbearing Exercise          AAA:      General: Screening Not Indicated          Glaucoma:      General: Risks and Benefits Discussed and Screening Current          HIV:      General: Screening Not Indicated          Advanced Directives:   Patient has no living will for healthcare, does not have durable POA for healthcare, patient does not have an advanced directive

## 2019-02-11 NOTE — PROGRESS NOTES
Assessment/Plan:    Problem List Items Addressed This Visit        Endocrine    Hypothyroidism    Relevant Orders    CBC and differential    Comprehensive metabolic panel    TSH, 3rd generation  Hypothyroid, TSH is elevated, as she was not taking her medication for 2 weeks, advised to take medicine regularly recheck her labs in 6 months and follow up       Cardiovascular and Mediastinum    RESOLVED: Benign essential hypertension       Other    Hyperlipidemia    Relevant Orders    Lipid panel  LDL is high, as she was out of statins and she is not eating low-fat diet, advised to take medicine regularly and try to eat low-fat diet    Medicare annual wellness visit, subsequent - Primary          Chief Complaint   Patient presents with    Follow-up    Medicare Wellness Visit       Subjective:   Patient ID: Michael Murphy is a 76 y o  female  She is here for follow-up on her labs, she has hypothyroid and hyperlipidemia, she was out of her medicine for at least 2 weeks before she had her blood work done and she does not eat low-fat diet, and she has gained weight  She says she is scheduled for mammogram and colonoscopy      Review of Systems   Constitutional: Negative for activity change, appetite change, chills, diaphoresis, fatigue, fever and unexpected weight change  HENT: Negative for congestion, dental problem, ear discharge, ear pain, facial swelling, hearing loss, mouth sores, nosebleeds, postnasal drip, rhinorrhea, sinus pressure, sinus pain, sneezing, sore throat, trouble swallowing and voice change  Eyes: Negative for photophobia, pain, discharge, redness and itching  Respiratory: Negative for cough, chest tightness, shortness of breath and wheezing  Cardiovascular: Negative for chest pain, palpitations and leg swelling  Gastrointestinal: Negative for abdominal distention, abdominal pain, blood in stool, constipation, diarrhea and nausea     Endocrine: Negative for cold intolerance, heat intolerance, polydipsia, polyphagia and polyuria  Genitourinary: Negative for dysuria, flank pain, frequency, hematuria and urgency  Musculoskeletal: Negative for arthralgias, back pain, myalgias and neck pain  Skin: Negative for color change and pallor  Allergic/Immunologic: Negative for environmental allergies and food allergies  Neurological: Negative for dizziness, weakness, light-headedness, numbness and headaches  Hematological: Negative for adenopathy  Does not bruise/bleed easily  Psychiatric/Behavioral: Negative for behavioral problems, sleep disturbance and suicidal ideas  The patient is not nervous/anxious  Objective:  Physical Exam   Constitutional: She is oriented to person, place, and time  She appears well-developed and well-nourished  HENT:   Head: Normocephalic and atraumatic  Right Ear: External ear normal    Left Ear: External ear normal    Nose: Nose normal    Mouth/Throat: Oropharynx is clear and moist  No oropharyngeal exudate  Eyes: Pupils are equal, round, and reactive to light  Conjunctivae and EOM are normal  Right eye exhibits no discharge  Left eye exhibits no discharge  No scleral icterus  Neck: Normal range of motion  Neck supple  No tracheal deviation present  No thyromegaly present  Cardiovascular: Normal rate, regular rhythm and normal heart sounds  No murmur heard  Pulmonary/Chest: Effort normal and breath sounds normal  No respiratory distress  She has no wheezes  She has no rales  Abdominal: She exhibits no distension and no mass  There is no tenderness  There is no rebound  Musculoskeletal: Normal range of motion  She exhibits no edema  Lymphadenopathy:     She has no cervical adenopathy  Neurological: She is alert and oriented to person, place, and time  She has normal reflexes  No cranial nerve deficit  Skin: Skin is warm  No rash noted  No erythema  No pallor  Psychiatric: She has a normal mood and affect   Her behavior is normal  Judgment and thought content normal    Nursing note and vitals reviewed             Current Outpatient Medications:     levothyroxine 25 mcg tablet, Take 1 tablet (25 mcg total) by mouth daily, Disp: 90 tablet, Rfl: 1    simvastatin (ZOCOR) 40 mg tablet, Take 1 tablet (40 mg total) by mouth daily at bedtime, Disp: 90 tablet, Rfl: 1    No Known Allergies    Vitals:    02/11/19 1424 02/11/19 1427   BP:  132/74   Pulse:  88   Resp:  16   SpO2:  98%   Weight:  84 8 kg (187 lb)   Height: 5' 3" (1 6 m)

## 2019-03-06 ENCOUNTER — ANESTHESIA EVENT (OUTPATIENT)
Dept: PERIOP | Facility: AMBULARY SURGERY CENTER | Age: 69
End: 2019-03-06
Payer: MEDICARE

## 2019-03-20 ENCOUNTER — ANESTHESIA (OUTPATIENT)
Dept: PERIOP | Facility: AMBULARY SURGERY CENTER | Age: 69
End: 2019-03-20
Payer: MEDICARE

## 2019-03-20 ENCOUNTER — HOSPITAL ENCOUNTER (OUTPATIENT)
Facility: AMBULARY SURGERY CENTER | Age: 69
Setting detail: OUTPATIENT SURGERY
Discharge: HOME | End: 2019-03-20
Attending: INTERNAL MEDICINE | Admitting: INTERNAL MEDICINE
Payer: MEDICARE

## 2019-03-20 VITALS
OXYGEN SATURATION: 99 % | HEART RATE: 61 BPM | TEMPERATURE: 96.9 F | BODY MASS INDEX: 31.07 KG/M2 | SYSTOLIC BLOOD PRESSURE: 117 MMHG | DIASTOLIC BLOOD PRESSURE: 95 MMHG | WEIGHT: 182 LBS | HEIGHT: 64 IN | RESPIRATION RATE: 20 BRPM

## 2019-03-20 DIAGNOSIS — Z12.11 SCREEN FOR COLON CANCER: ICD-10-CM

## 2019-03-20 PROCEDURE — 45385 COLONOSCOPY W/LESION REMOVAL: CPT | Performed by: INTERNAL MEDICINE

## 2019-03-20 PROCEDURE — 88305 TISSUE EXAM BY PATHOLOGIST: CPT | Performed by: PATHOLOGY

## 2019-03-20 RX ORDER — PROPOFOL 10 MG/ML
INJECTION, EMULSION INTRAVENOUS AS NEEDED
Status: DISCONTINUED | OUTPATIENT
Start: 2019-03-20 | End: 2019-03-20 | Stop reason: SURG

## 2019-03-20 RX ORDER — LIDOCAINE HYDROCHLORIDE 10 MG/ML
INJECTION, SOLUTION INFILTRATION; PERINEURAL AS NEEDED
Status: DISCONTINUED | OUTPATIENT
Start: 2019-03-20 | End: 2019-03-20 | Stop reason: SURG

## 2019-03-20 RX ORDER — SODIUM CHLORIDE 9 MG/ML
100 INJECTION, SOLUTION INTRAVENOUS CONTINUOUS
Status: DISCONTINUED | OUTPATIENT
Start: 2019-03-20 | End: 2019-03-20 | Stop reason: HOSPADM

## 2019-03-20 RX ADMIN — SODIUM CHLORIDE 100 ML/HR: 0.9 INJECTION, SOLUTION INTRAVENOUS at 08:38

## 2019-03-20 RX ADMIN — PROPOFOL 70 MG: 10 INJECTION, EMULSION INTRAVENOUS at 08:43

## 2019-03-20 RX ADMIN — PROPOFOL 40 MG: 10 INJECTION, EMULSION INTRAVENOUS at 08:58

## 2019-03-20 RX ADMIN — PROPOFOL 30 MG: 10 INJECTION, EMULSION INTRAVENOUS at 08:49

## 2019-03-20 RX ADMIN — PROPOFOL 20 MG: 10 INJECTION, EMULSION INTRAVENOUS at 09:00

## 2019-03-20 RX ADMIN — PROPOFOL 30 MG: 10 INJECTION, EMULSION INTRAVENOUS at 08:53

## 2019-03-20 RX ADMIN — PROPOFOL 30 MG: 10 INJECTION, EMULSION INTRAVENOUS at 08:52

## 2019-03-20 RX ADMIN — LIDOCAINE HYDROCHLORIDE ANHYDROUS 50 MG: 10 INJECTION, SOLUTION INFILTRATION at 08:43

## 2019-03-20 RX ADMIN — PROPOFOL 40 MG: 10 INJECTION, EMULSION INTRAVENOUS at 08:50

## 2019-03-20 NOTE — DISCHARGE INSTR - AVS FIRST PAGE
Colonoscopy Procedure Note    Procedure: Colonoscopy    Sedation: Monitored anesthesia care, check anesthesia records      ASA Class: 2    INDICATIONS:  Colon cancer screening  POST-OP DIAGNOSIS: See the impression below    Procedure Details     Prior colonoscopy: No prior colonoscopy  Informed consent was obtained for the procedure, including sedation  Risks of perforation, hemorrhage, adverse drug reaction and aspiration were discussed  The patient was placed in the left lateral decubitus position  Based on the pre-procedure assessment, including review of the patient's medical history, medications, allergies, and review of systems, she had been deemed to be an appropriate candidate for conscious sedation; she was therefore sedated with the medications listed below  The patient was monitored continuously with telemetry, pulse oximetry, blood pressure monitoring, and direct observations  A rectal examination was performed  The variable-stiffness pediatric colonoscope was inserted into the rectum and advanced under direct vision to the cecum, which was identified by the ileocecal valve and appendiceal orifice  The quality of the colonic preparation was good  A careful inspection was made as the colonoscope was withdrawn, including a retroflexed view of the rectum; findings and interventions are described below  Findings:  1  Single pedunculated polyp measuring 5 mm noted in the transverse colon, removed using cold snare polypectomy in its entirety  2  Retroflexed view was notable for small internal hemorrhoids  Complications: None; patient tolerated the procedure well  Impression:    1  Single colon polyp  2  Small internal hemorrhoids  Recommendations:  Repeat colonoscopy in 5 years if polyps are adenomas  High-fiber diet  Follow-up biopsy results in 2-3 weeks  COMPLICATIONS:  None; patient tolerated the procedure well      SPECIMENS:    ID Type Source Tests Collected by Time Destination   1 : transverse polyp cold snare and cold forcep Tissue Polyp, Colorectal TISSUE EXAM Josh Montero MD 3/20/2019  8:53 AM        ESTIMATED BLOOD LOSS:  Minimal

## 2019-03-20 NOTE — H&P
History and Physical - [facility]  Aria Vizcarra 76 y o  female MRN: [id number]    HPI: Aria Vizcarra is a 76y o  year old female who presents for colon cancer screening  No family history of colon cancer  Review of Systems    Historical Information   Past Medical History:   Diagnosis Date    Disease of thyroid gland     Hyperlipidemia      Past Surgical History:   Procedure Laterality Date    TONSILLECTOMY       Social History   Social History     Substance and Sexual Activity   Alcohol Use Yes    Frequency: 4 or more times a week    Drinks per session: 1 or 2     Social History     Substance and Sexual Activity   Drug Use Never     Social History     Tobacco Use   Smoking Status Never Smoker   Smokeless Tobacco Never Used     History reviewed  No pertinent family history  Meds/Allergies     Medications Prior to Admission   Medication    aspirin 81 mg chewable tablet    levothyroxine 25 mcg tablet    simvastatin (ZOCOR) 40 mg tablet       No Known Allergies    Objective     Blood pressure 169/87, pulse 62, temperature (!) 96 5 °F (35 8 °C), temperature source Temporal, resp  rate 18, height 5' 4" (1 626 m), weight 82 6 kg (182 lb), SpO2 100 %  PHYSICAL EXAM    Gen: NAD  CV: RRR  CHEST: Clear  ABD: soft, NT/ND  EXT: no edema  Neuro: AAO      ASSESSMENT/PLAN:  This is a 76y o  year old female here for colon cancer screening  PLAN:   Procedure:  Colonoscopy

## 2019-03-20 NOTE — ANESTHESIA POSTPROCEDURE EVALUATION
Post-Op Assessment Note    CV Status:  Stable  Pain Score: 0    Pain management: adequate     Mental Status:  Awake and sleepy   Hydration Status:  Euvolemic   PONV Controlled:  Controlled   Airway Patency:  Patent   Post Op Vitals Reviewed: Yes      Staff: CRNA           /74 (03/20/19 0908)    Temp (!) 96 9 °F (36 1 °C) (03/20/19 0908)    Pulse 71 (03/20/19 0908)   Resp 13 (03/20/19 0908)    SpO2 96 % (03/20/19 0908)

## 2019-03-20 NOTE — ANESTHESIA PREPROCEDURE EVALUATION
Review of Systems/Medical History  Patient summary reviewed    No history of anesthetic complications     Cardiovascular  EKG reviewed, Exercise tolerance (METS): >4,  Hyperlipidemia,    Pulmonary  Negative pulmonary ROS        GI/Hepatic    Bowel prep       Negative  ROS        Endo/Other  History of thyroid disease , hypothyroidism,   Obesity    GYN       Hematology  Negative hematology ROS      Musculoskeletal  Negative musculoskeletal ROS        Neurology  Negative neurology ROS      Psychology   Negative psychology ROS              Physical Exam    Airway    Mallampati score: II  TM Distance: >3 FB  Neck ROM: full     Dental   No notable dental hx     Cardiovascular  Cardiovascular exam normal    Pulmonary  Pulmonary exam normal     Other Findings        Anesthesia Plan  ASA Score- 2     Anesthesia Type- IV sedation with anesthesia with ASA Monitors  Additional Monitors:   Airway Plan:         Plan Factors-  Patient did not smoke on day of surgery  Induction- intravenous  Postoperative Plan-     Informed Consent- Anesthetic plan and risks discussed with patient  I personally reviewed this patient with the CRNA  Discussed and agreed on the Anesthesia Plan with the CRNA  Hannah Soni

## 2019-03-20 NOTE — OP NOTE
Colonoscopy Procedure Note    Procedure: Colonoscopy    Sedation: Monitored anesthesia care, check anesthesia records      ASA Class: 2    INDICATIONS:  Colon cancer screening  POST-OP DIAGNOSIS: See the impression below    Procedure Details     Prior colonoscopy: No prior colonoscopy  Informed consent was obtained for the procedure, including sedation  Risks of perforation, hemorrhage, adverse drug reaction and aspiration were discussed  The patient was placed in the left lateral decubitus position  Based on the pre-procedure assessment, including review of the patient's medical history, medications, allergies, and review of systems, she had been deemed to be an appropriate candidate for conscious sedation; she was therefore sedated with the medications listed below  The patient was monitored continuously with telemetry, pulse oximetry, blood pressure monitoring, and direct observations  A rectal examination was performed  The variable-stiffness pediatric colonoscope was inserted into the rectum and advanced under direct vision to the cecum, which was identified by the ileocecal valve and appendiceal orifice  The quality of the colonic preparation was good  A careful inspection was made as the colonoscope was withdrawn, including a retroflexed view of the rectum; findings and interventions are described below  Findings:  1  Single pedunculated polyp measuring 5 mm noted in the transverse colon, removed using cold snare polypectomy in its entirety  2  Retroflexed view was notable for small internal hemorrhoids  Complications: None; patient tolerated the procedure well  Impression:    1  Single colon polyp  2  Small internal hemorrhoids  Recommendations:  Repeat colonoscopy in 5 years if polyps are adenomas  High-fiber diet  Follow-up biopsy results in 2-3 weeks  COMPLICATIONS:  None; patient tolerated the procedure well      SPECIMENS:    ID Type Source Tests Collected by Time Destination   1 : transverse polyp cold snare and cold forcep Tissue Polyp, Colorectal TISSUE EXAM Kamini Dalton MD 3/20/2019  8:53 AM        ESTIMATED BLOOD LOSS:  Minimal

## 2019-03-21 ENCOUNTER — TELEPHONE (OUTPATIENT)
Dept: GASTROENTEROLOGY | Facility: AMBULARY SURGERY CENTER | Age: 69
End: 2019-03-21

## 2019-03-21 NOTE — TELEPHONE ENCOUNTER
----- Message from Shu Yanez MD sent at 3/21/2019 10:12 AM EDT -----  Please inform the patient that the polyp removed was tubular adenoma, would recommend repeat colonoscopy in 5 years

## 2019-03-25 ENCOUNTER — TELEPHONE (OUTPATIENT)
Dept: FAMILY MEDICINE CLINIC | Facility: CLINIC | Age: 69
End: 2019-03-25

## 2019-03-25 DIAGNOSIS — I10 BENIGN ESSENTIAL HYPERTENSION: Primary | ICD-10-CM

## 2019-03-25 RX ORDER — HYDROCHLOROTHIAZIDE 25 MG/1
25 TABLET ORAL DAILY
Qty: 30 TABLET | Refills: 0 | Status: SHIPPED | OUTPATIENT
Start: 2019-03-25 | End: 2019-04-08 | Stop reason: SDUPTHER

## 2019-03-25 NOTE — TELEPHONE ENCOUNTER
I spoke with patient and she advised me that her Bp was elevated when she had her recent colonoscopy and [facility] and it was high  Patient wishes to go back on the medication she took for years  Patient advised me she does not feel as though she needs to come in  I did advise her that if you did prescribe the medication she would need to follow up in 2 weeks to have her BP checked patient was in agreement with that

## 2019-03-25 NOTE — TELEPHONE ENCOUNTER
E scribed hydrochlorothiazide which is in the previous list of her medications, please inform the patient and have follow-up in 2 weeks

## 2019-03-25 NOTE — TELEPHONE ENCOUNTER
Please inform the patient that she is not on any blood pressure medicine ,blood pressure was only elevated 1 time,2nd time it was normal  if she has her blood pressure at home she needs to follow-up so we can recheck here

## 2019-03-25 NOTE — TELEPHONE ENCOUNTER
Patient called and requested a blood pressure medication that was never ordered? Patient was here early [de-identified] and BP was high was advised to follow up in a week  She did and Bp was better  I do not see anything documented but apparently patient was under the impression she was going to be starting a medication? Please advise

## 2019-03-26 NOTE — TELEPHONE ENCOUNTER
Patient did not think she had taken HCTZ for her BP  I advised her that is all that is in her chart going back 5 years   Patient was going to check her records and call us back

## 2019-04-02 ENCOUNTER — OFFICE VISIT (OUTPATIENT)
Dept: OBGYN CLINIC | Facility: CLINIC | Age: 69
End: 2019-04-02
Payer: MEDICARE

## 2019-04-02 VITALS
BODY MASS INDEX: 32.43 KG/M2 | SYSTOLIC BLOOD PRESSURE: 170 MMHG | HEIGHT: 63 IN | HEART RATE: 82 BPM | DIASTOLIC BLOOD PRESSURE: 100 MMHG | WEIGHT: 183 LBS

## 2019-04-02 DIAGNOSIS — M25.561 RIGHT KNEE PAIN, UNSPECIFIED CHRONICITY: Primary | ICD-10-CM

## 2019-04-02 PROCEDURE — 99203 OFFICE O/P NEW LOW 30 MIN: CPT | Performed by: ORTHOPAEDIC SURGERY

## 2019-04-05 ENCOUNTER — HOSPITAL ENCOUNTER (OUTPATIENT)
Dept: MAMMOGRAPHY | Facility: HOSPITAL | Age: 69
Discharge: HOME | End: 2019-04-05
Attending: FAMILY MEDICINE
Payer: MEDICARE

## 2019-04-05 VITALS — WEIGHT: 183 LBS | HEIGHT: 63 IN | BODY MASS INDEX: 32.43 KG/M2

## 2019-04-05 DIAGNOSIS — Z12.31 ENCOUNTER FOR SCREENING MAMMOGRAM FOR BREAST CANCER: ICD-10-CM

## 2019-04-05 PROCEDURE — 77067 SCR MAMMO BI INCL CAD: CPT

## 2019-04-05 PROCEDURE — 77063 BREAST TOMOSYNTHESIS BI: CPT

## 2019-04-05 RX ORDER — MELOXICAM 7.5 MG/1
7.5 TABLET ORAL DAILY
Refills: 0 | COMMUNITY
Start: 2019-03-24 | End: 2019-04-22

## 2019-04-08 ENCOUNTER — OFFICE VISIT (OUTPATIENT)
Dept: FAMILY MEDICINE CLINIC | Facility: CLINIC | Age: 69
End: 2019-04-08
Payer: MEDICARE

## 2019-04-08 VITALS
SYSTOLIC BLOOD PRESSURE: 140 MMHG | HEART RATE: 72 BPM | BODY MASS INDEX: 32.43 KG/M2 | WEIGHT: 183 LBS | HEIGHT: 63 IN | RESPIRATION RATE: 16 BRPM | OXYGEN SATURATION: 98 % | DIASTOLIC BLOOD PRESSURE: 90 MMHG

## 2019-04-08 DIAGNOSIS — I10 ESSENTIAL HYPERTENSION: Primary | ICD-10-CM

## 2019-04-08 DIAGNOSIS — I10 BENIGN ESSENTIAL HYPERTENSION: ICD-10-CM

## 2019-04-08 PROCEDURE — 99213 OFFICE O/P EST LOW 20 MIN: CPT | Performed by: FAMILY MEDICINE

## 2019-04-08 RX ORDER — LOSARTAN POTASSIUM 25 MG/1
25 TABLET ORAL DAILY
Qty: 90 TABLET | Refills: 1 | Status: SHIPPED | OUTPATIENT
Start: 2019-04-08 | End: 2019-08-18 | Stop reason: SDUPTHER

## 2019-04-08 RX ORDER — HYDROCHLOROTHIAZIDE 25 MG/1
25 TABLET ORAL DAILY
Qty: 90 TABLET | Refills: 1 | Status: SHIPPED | OUTPATIENT
Start: 2019-04-08 | End: 2019-10-08 | Stop reason: SDUPTHER

## 2019-04-22 ENCOUNTER — OFFICE VISIT (OUTPATIENT)
Dept: FAMILY MEDICINE CLINIC | Facility: CLINIC | Age: 69
End: 2019-04-22
Payer: MEDICARE

## 2019-04-22 VITALS
HEIGHT: 63 IN | WEIGHT: 183 LBS | HEART RATE: 79 BPM | DIASTOLIC BLOOD PRESSURE: 74 MMHG | RESPIRATION RATE: 16 BRPM | OXYGEN SATURATION: 98 % | SYSTOLIC BLOOD PRESSURE: 122 MMHG | BODY MASS INDEX: 32.43 KG/M2

## 2019-04-22 DIAGNOSIS — I10 BENIGN ESSENTIAL HYPERTENSION: Primary | ICD-10-CM

## 2019-04-22 PROCEDURE — 99214 OFFICE O/P EST MOD 30 MIN: CPT | Performed by: FAMILY MEDICINE

## 2019-08-18 DIAGNOSIS — I10 ESSENTIAL HYPERTENSION: ICD-10-CM

## 2019-08-19 ENCOUNTER — APPOINTMENT (OUTPATIENT)
Dept: LAB | Facility: CLINIC | Age: 69
End: 2019-08-19
Payer: MEDICARE

## 2019-08-19 DIAGNOSIS — E03.9 HYPOTHYROIDISM, UNSPECIFIED TYPE: ICD-10-CM

## 2019-08-19 DIAGNOSIS — E78.5 HYPERLIPIDEMIA, UNSPECIFIED HYPERLIPIDEMIA TYPE: ICD-10-CM

## 2019-08-19 LAB
ALBUMIN SERPL BCP-MCNC: 3.8 G/DL (ref 3.5–5)
ALP SERPL-CCNC: 51 U/L (ref 46–116)
ALT SERPL W P-5'-P-CCNC: 29 U/L (ref 12–78)
ANION GAP SERPL CALCULATED.3IONS-SCNC: 7 MMOL/L (ref 4–13)
AST SERPL W P-5'-P-CCNC: 16 U/L (ref 5–45)
BASOPHILS # BLD AUTO: 0.04 THOUSANDS/ΜL (ref 0–0.1)
BASOPHILS NFR BLD AUTO: 1 % (ref 0–1)
BILIRUB SERPL-MCNC: 0.4 MG/DL (ref 0.2–1)
BUN SERPL-MCNC: 13 MG/DL (ref 5–25)
CALCIUM SERPL-MCNC: 9.3 MG/DL (ref 8.3–10.1)
CHLORIDE SERPL-SCNC: 106 MMOL/L (ref 100–108)
CHOLEST SERPL-MCNC: 190 MG/DL (ref 50–200)
CO2 SERPL-SCNC: 29 MMOL/L (ref 21–32)
CREAT SERPL-MCNC: 0.83 MG/DL (ref 0.6–1.3)
EOSINOPHIL # BLD AUTO: 0.21 THOUSAND/ΜL (ref 0–0.61)
EOSINOPHIL NFR BLD AUTO: 4 % (ref 0–6)
ERYTHROCYTE [DISTWIDTH] IN BLOOD BY AUTOMATED COUNT: 12.2 % (ref 11.6–15.1)
GFR SERPL CREATININE-BSD FRML MDRD: 72 ML/MIN/1.73SQ M
GLUCOSE P FAST SERPL-MCNC: 89 MG/DL (ref 65–99)
HCT VFR BLD AUTO: 41.7 % (ref 34.8–46.1)
HDLC SERPL-MCNC: 55 MG/DL (ref 40–60)
HGB BLD-MCNC: 13.9 G/DL (ref 11.5–15.4)
IMM GRANULOCYTES # BLD AUTO: 0.01 THOUSAND/UL (ref 0–0.2)
IMM GRANULOCYTES NFR BLD AUTO: 0 % (ref 0–2)
LDLC SERPL CALC-MCNC: 113 MG/DL (ref 0–100)
LYMPHOCYTES # BLD AUTO: 1.44 THOUSANDS/ΜL (ref 0.6–4.47)
LYMPHOCYTES NFR BLD AUTO: 28 % (ref 14–44)
MCH RBC QN AUTO: 32 PG (ref 26.8–34.3)
MCHC RBC AUTO-ENTMCNC: 33.3 G/DL (ref 31.4–37.4)
MCV RBC AUTO: 96 FL (ref 82–98)
MONOCYTES # BLD AUTO: 0.57 THOUSAND/ΜL (ref 0.17–1.22)
MONOCYTES NFR BLD AUTO: 11 % (ref 4–12)
NEUTROPHILS # BLD AUTO: 2.86 THOUSANDS/ΜL (ref 1.85–7.62)
NEUTS SEG NFR BLD AUTO: 56 % (ref 43–75)
NONHDLC SERPL-MCNC: 135 MG/DL
NRBC BLD AUTO-RTO: 0 /100 WBCS
PLATELET # BLD AUTO: 244 THOUSANDS/UL (ref 149–390)
PMV BLD AUTO: 9 FL (ref 8.9–12.7)
POTASSIUM SERPL-SCNC: 4.1 MMOL/L (ref 3.5–5.3)
PROT SERPL-MCNC: 7.1 G/DL (ref 6.4–8.2)
RBC # BLD AUTO: 4.34 MILLION/UL (ref 3.81–5.12)
SODIUM SERPL-SCNC: 142 MMOL/L (ref 136–145)
TRIGL SERPL-MCNC: 112 MG/DL
TSH SERPL DL<=0.05 MIU/L-ACNC: 3.59 UIU/ML (ref 0.36–3.74)
WBC # BLD AUTO: 5.13 THOUSAND/UL (ref 4.31–10.16)

## 2019-08-19 PROCEDURE — 80061 LIPID PANEL: CPT

## 2019-08-19 PROCEDURE — 80053 COMPREHEN METABOLIC PANEL: CPT

## 2019-08-19 PROCEDURE — 36415 COLL VENOUS BLD VENIPUNCTURE: CPT

## 2019-08-19 PROCEDURE — 85025 COMPLETE CBC W/AUTO DIFF WBC: CPT

## 2019-08-19 PROCEDURE — 84443 ASSAY THYROID STIM HORMONE: CPT

## 2019-08-19 RX ORDER — LOSARTAN POTASSIUM 25 MG/1
TABLET ORAL
Qty: 90 TABLET | Refills: 1 | Status: SHIPPED | OUTPATIENT
Start: 2019-08-19 | End: 2019-11-26 | Stop reason: SDUPTHER

## 2019-08-23 ENCOUNTER — OFFICE VISIT (OUTPATIENT)
Dept: FAMILY MEDICINE CLINIC | Facility: CLINIC | Age: 69
End: 2019-08-23
Payer: MEDICARE

## 2019-08-23 VITALS
OXYGEN SATURATION: 98 % | RESPIRATION RATE: 16 BRPM | SYSTOLIC BLOOD PRESSURE: 126 MMHG | WEIGHT: 184 LBS | DIASTOLIC BLOOD PRESSURE: 80 MMHG | HEART RATE: 82 BPM | BODY MASS INDEX: 32.6 KG/M2 | HEIGHT: 63 IN | TEMPERATURE: 97.6 F

## 2019-08-23 DIAGNOSIS — R05.8 DRY COUGH: ICD-10-CM

## 2019-08-23 DIAGNOSIS — H93.8X1 CONGESTION OF RIGHT EAR: Primary | ICD-10-CM

## 2019-08-23 DIAGNOSIS — R09.81 NASAL CONGESTION: ICD-10-CM

## 2019-08-23 PROCEDURE — 1124F ACP DISCUSS-NO DSCNMKR DOCD: CPT | Performed by: FAMILY MEDICINE

## 2019-08-23 PROCEDURE — 99213 OFFICE O/P EST LOW 20 MIN: CPT | Performed by: FAMILY MEDICINE

## 2019-08-23 RX ORDER — BENZONATATE 100 MG/1
100 CAPSULE ORAL 3 TIMES DAILY PRN
Qty: 20 CAPSULE | Refills: 0 | Status: SHIPPED | OUTPATIENT
Start: 2019-08-23 | End: 2019-09-30

## 2019-08-23 RX ORDER — FLUTICASONE PROPIONATE 50 MCG
1 SPRAY, SUSPENSION (ML) NASAL DAILY
Qty: 1 BOTTLE | Refills: 2 | Status: SHIPPED | OUTPATIENT
Start: 2019-08-23 | End: 2019-11-26

## 2019-08-23 RX ORDER — GUAIFENESIN 600 MG
1200 TABLET, EXTENDED RELEASE 12 HR ORAL EVERY 12 HOURS SCHEDULED
Qty: 30 TABLET | Refills: 0 | Status: SHIPPED | OUTPATIENT
Start: 2019-08-23 | End: 2019-11-26

## 2019-08-23 NOTE — PROGRESS NOTES
Assessment/Plan:   Diagnoses and all orders for this visit:    Congestion of right ear  -     guaiFENesin (MUCINEX) 600 mg 12 hr tablet; Take 2 tablets (1,200 mg total) by mouth every 12 (twelve) hours  Dry cough  -     benzonatate (TESSALON PERLES) 100 mg capsule; Take 1 capsule (100 mg total) by mouth 3 (three) times a day as needed for cough  Nasal congestion  -     fluticasone (FLONASE) 50 mcg/act nasal spray; 1 spray into each nostril daily  - advised supportive care with DayQuill/NyQuill  - encouraged fluids, hot tea with honey to soothe the throat  - educated that can take 7-10days to resolve  - frequent hand-washing to prevent the spread of infection   - advised to call the office IF have a fever >101, cough not improving        Subjective:    Patient ID: Chuck Tang is a 71 y o  female  71yo F presents to the office for eval of sinusitis, blocked ears and chest congestion   - started Mon with sore throat for 2days (which has now since resolved)   - "all of my sinuses by Wednesday were hurting"   - (+) stuffy nose, dry cough ("worse anytime")  - going on vacation next week   - denies F/C/N/V/earache/dizziness/sore throat/CP/palpitations/SOB/wheezing/abd pain/D/C/LE edema  - no seasonal allergies   - appetite = not good   - denies sick contacts  - not a smoker   - has tried Mucinex DM x2 - didn't take anything yesterday, Aleve, Nasal Spray       The following portions of the patient's history were reviewed and updated as appropriate: allergies, current medications, past family history, past medical history, past social history, past surgical history and problem list     Review of Systems  as per HPI    Objective:  /80   Pulse 82   Temp 97 6 °F (36 4 °C)   Resp 16   Ht 5' 3" (1 6 m)   Wt 83 5 kg (184 lb)   SpO2 98%   BMI 32 59 kg/m²    Physical Exam   Constitutional: She is oriented to person, place, and time  She appears well-developed and well-nourished  No distress     HENT:   Head: Normocephalic and atraumatic  Right Ear: External ear and ear canal normal  No drainage, swelling or tenderness  A middle ear effusion is present  Left Ear: External ear and ear canal normal  No drainage, swelling or tenderness  No middle ear effusion  Nose: Rhinorrhea present  No mucosal edema  Right sinus exhibits no maxillary sinus tenderness and no frontal sinus tenderness  Left sinus exhibits no maxillary sinus tenderness and no frontal sinus tenderness  Mouth/Throat: Uvula is midline, oropharynx is clear and moist and mucous membranes are normal  No uvula swelling  No oropharyngeal exudate, posterior oropharyngeal edema or posterior oropharyngeal erythema  Eyes: Conjunctivae and EOM are normal  Right eye exhibits no discharge  Left eye exhibits no discharge  No scleral icterus  Neck: Normal range of motion  Neck supple  Cardiovascular: Normal rate, regular rhythm and normal heart sounds  Exam reveals no gallop and no friction rub  No murmur heard  Pulmonary/Chest: Effort normal and breath sounds normal  No stridor  No respiratory distress  She has no wheezes  She has no rales  Abdominal: Soft  Musculoskeletal: Normal range of motion  Lymphadenopathy:     She has no cervical adenopathy  Neurological: She is alert and oriented to person, place, and time  Skin: Skin is warm  She is not diaphoretic  Psychiatric: She has a normal mood and affect  Her behavior is normal    Vitals reviewed

## 2019-09-30 ENCOUNTER — OFFICE VISIT (OUTPATIENT)
Dept: FAMILY MEDICINE CLINIC | Facility: CLINIC | Age: 69
End: 2019-09-30
Payer: MEDICARE

## 2019-09-30 VITALS
SYSTOLIC BLOOD PRESSURE: 138 MMHG | DIASTOLIC BLOOD PRESSURE: 80 MMHG | WEIGHT: 186 LBS | HEIGHT: 63 IN | TEMPERATURE: 97.8 F | BODY MASS INDEX: 32.96 KG/M2 | OXYGEN SATURATION: 98 % | HEART RATE: 68 BPM

## 2019-09-30 DIAGNOSIS — J20.9 ACUTE BRONCHITIS, UNSPECIFIED ORGANISM: Primary | ICD-10-CM

## 2019-09-30 PROCEDURE — 99213 OFFICE O/P EST LOW 20 MIN: CPT | Performed by: FAMILY MEDICINE

## 2019-09-30 RX ORDER — PREDNISONE 10 MG/1
TABLET ORAL
Qty: 26 TABLET | Refills: 0 | Status: SHIPPED | OUTPATIENT
Start: 2019-09-30 | End: 2019-11-26

## 2019-09-30 RX ORDER — AMOXICILLIN AND CLAVULANATE POTASSIUM 875; 125 MG/1; MG/1
1 TABLET, FILM COATED ORAL EVERY 12 HOURS SCHEDULED
Qty: 14 TABLET | Refills: 0 | Status: SHIPPED | OUTPATIENT
Start: 2019-09-30 | End: 2019-10-07

## 2019-09-30 NOTE — ASSESSMENT & PLAN NOTE
Symptoms are more than 6 weeks now will start her on tapering dose of prednisone and Augmentin and she will follow up if  not better

## 2019-09-30 NOTE — PROGRESS NOTES
Assessment/Plan:    Problem List Items Addressed This Visit        Respiratory    Acute bronchitis - Primary     Symptoms are more than 6 weeks now will start her on tapering dose of prednisone and Augmentin and she will follow up if  not better         Relevant Medications    amoxicillin-clavulanate (AUGMENTIN) 875-125 mg per tablet    predniSONE 10 mg tablet          Chief Complaint   Patient presents with    Cold Like Symptoms       Subjective:   Patient ID: Antonio Caruso is a 71 y o  female  She says it is almost 6 weeks she has lot of nasal congestion cough nasal discharge and her condition was not improving she was seen few weeks ago and she was given symptomatic care, she went on a trip [place] she did not feel any improvement she has been using nasal spray fluticasone  Denies any fever chills but she says she is tired of this all sickness  She denies any yellow-green phlegm she says she get easily short of breath when she goes up stairs,      Review of Systems   Constitutional: Positive for fatigue  Negative for activity change, appetite change, chills, diaphoresis, fever and unexpected weight change  HENT: Negative for congestion, dental problem, ear discharge, ear pain, facial swelling, hearing loss, mouth sores, nosebleeds, postnasal drip, rhinorrhea, sinus pressure, sinus pain, sneezing, sore throat, trouble swallowing and voice change  Eyes: Negative for photophobia, pain, discharge, redness and itching  Respiratory: Negative for cough, chest tightness, shortness of breath and wheezing  Cardiovascular: Negative for chest pain, palpitations and leg swelling  Gastrointestinal: Negative for abdominal distention, abdominal pain, blood in stool, constipation, diarrhea and nausea  Endocrine: Negative for cold intolerance, heat intolerance, polydipsia, polyphagia and polyuria  Genitourinary: Negative for dysuria, flank pain, frequency, hematuria and urgency     Musculoskeletal: Negative for arthralgias, back pain, myalgias and neck pain  Skin: Negative for color change and pallor  Allergic/Immunologic: Negative for environmental allergies and food allergies  Neurological: Negative for dizziness, weakness, light-headedness, numbness and headaches  Hematological: Negative for adenopathy  Does not bruise/bleed easily  Psychiatric/Behavioral: Negative for behavioral problems, sleep disturbance and suicidal ideas  The patient is not nervous/anxious  Objective:  Physical Exam   Constitutional: She appears well-developed and well-nourished  HENT:   Head: Normocephalic and atraumatic  Right Ear: External ear normal    Left Ear: External ear normal    Mouth/Throat: Oropharynx is clear and moist    Eyes: Pupils are equal, round, and reactive to light  Conjunctivae and EOM are normal  No scleral icterus  Neck: Normal range of motion  Neck supple  No thyromegaly present  Cardiovascular: Normal rate, regular rhythm and normal heart sounds  Pulmonary/Chest: Effort normal and breath sounds normal  She has no wheezes  She has no rales  Lymphadenopathy:     She has no cervical adenopathy  Neurological: She is alert  Skin: No rash noted  No erythema  Psychiatric: She has a normal mood and affect  Nursing note and vitals reviewed  Past Surgical History:   Procedure Laterality Date    BREAST BIOPSY Right 04/26/2012    COLONOSCOPY N/A 3/20/2019    Procedure: COLONOSCOPY;  Surgeon: Alvin Lu MD;  [facility]; Service: Gastroenterology    TONSILLECTOMY         No family history on file        Current Outpatient Medications:     fluticasone (FLONASE) 50 mcg/act nasal spray, 1 spray into each nostril daily, Disp: 1 Bottle, Rfl: 2    guaiFENesin (MUCINEX) 600 mg 12 hr tablet, Take 2 tablets (1,200 mg total) by mouth every 12 (twelve) hours, Disp: 30 tablet, Rfl: 0    hydrochlorothiazide (HYDRODIURIL) 25 mg tablet, Take 1 tablet (25 mg total) by mouth daily, Disp: 90 tablet, Rfl: 1    levothyroxine 25 mcg tablet, Take 1 tablet (25 mcg total) by mouth daily, Disp: 90 tablet, Rfl: 1    losartan (COZAAR) 25 mg tablet, TAKE 1 TABLET BY MOUTH EVERY DAY, Disp: 90 tablet, Rfl: 1    simvastatin (ZOCOR) 40 mg tablet, Take 1 tablet (40 mg total) by mouth daily at bedtime, Disp: 90 tablet, Rfl: 1    amoxicillin-clavulanate (AUGMENTIN) 875-125 mg per tablet, Take 1 tablet by mouth every 12 (twelve) hours for 7 days, Disp: 14 tablet, Rfl: 0    predniSONE 10 mg tablet, 6 tablets for 1st 2 days, 4 tablets for next 2 days, 2 tablets for 2 days and then 1 tablet for 2 days  , Disp: 26 tablet, Rfl: 0    Allergies   Allergen Reactions    Nickel Rash       Vitals:    09/30/19 1637   BP: 138/80   BP Location: Left arm   Patient Position: Sitting   Cuff Size: Large   Pulse: 68   Temp: 97 8 °F (36 6 °C)   SpO2: 98%   Weight: 84 4 kg (186 lb)   Height: 5' 3" (1 6 m)

## 2019-10-08 DIAGNOSIS — I10 BENIGN ESSENTIAL HYPERTENSION: ICD-10-CM

## 2019-10-08 RX ORDER — HYDROCHLOROTHIAZIDE 25 MG/1
TABLET ORAL
Qty: 90 TABLET | Refills: 1 | Status: SHIPPED | OUTPATIENT
Start: 2019-10-08 | End: 2019-11-26 | Stop reason: SDUPTHER

## 2019-10-25 DIAGNOSIS — E78.5 HYPERLIPIDEMIA, UNSPECIFIED HYPERLIPIDEMIA TYPE: ICD-10-CM

## 2019-10-25 DIAGNOSIS — E03.9 HYPOTHYROIDISM, UNSPECIFIED TYPE: ICD-10-CM

## 2019-10-29 DIAGNOSIS — E78.5 HYPERLIPIDEMIA, UNSPECIFIED HYPERLIPIDEMIA TYPE: ICD-10-CM

## 2019-10-29 DIAGNOSIS — I10 BENIGN ESSENTIAL HYPERTENSION: Primary | ICD-10-CM

## 2019-10-29 DIAGNOSIS — E03.9 HYPOTHYROIDISM, UNSPECIFIED TYPE: ICD-10-CM

## 2019-10-29 RX ORDER — SIMVASTATIN 40 MG
40 TABLET ORAL
Qty: 30 TABLET | Refills: 0 | Status: SHIPPED | OUTPATIENT
Start: 2019-10-29 | End: 2019-11-26

## 2019-10-29 RX ORDER — LEVOTHYROXINE SODIUM 0.03 MG/1
TABLET ORAL
Qty: 30 TABLET | Refills: 0 | Status: SHIPPED | OUTPATIENT
Start: 2019-10-29 | End: 2019-11-26

## 2019-10-29 NOTE — TELEPHONE ENCOUNTER
[facility] PATIENT FOR 11/18/2019     PATIENT IS REQUESTING LABS

## 2019-10-29 NOTE — TELEPHONE ENCOUNTER
Patient called in to find out the status of this    She said she is leaving town Thursday and needs to pick this up ASAP  She would like to know if someone else can call this in for her since Dr Barbara Worley is out  I advised her that she will be back tomorrow    Please call patient back

## 2019-10-29 NOTE — TELEPHONE ENCOUNTER
30 day supply provided  Dr Kaye Mcdermott wanted patient to FU for HTN, HLD and thyroid in August  Patient was seen but only for Bronchitis

## 2019-11-25 ENCOUNTER — APPOINTMENT (OUTPATIENT)
Dept: LAB | Facility: CLINIC | Age: 69
End: 2019-11-25
Payer: MEDICARE

## 2019-11-25 ENCOUNTER — TRANSCRIBE ORDERS (OUTPATIENT)
Dept: LAB | Facility: CLINIC | Age: 69
End: 2019-11-25

## 2019-11-25 DIAGNOSIS — I10 BENIGN ESSENTIAL HYPERTENSION: ICD-10-CM

## 2019-11-25 DIAGNOSIS — E03.9 HYPOTHYROIDISM, UNSPECIFIED TYPE: ICD-10-CM

## 2019-11-25 DIAGNOSIS — E78.5 HYPERLIPIDEMIA, UNSPECIFIED HYPERLIPIDEMIA TYPE: ICD-10-CM

## 2019-11-25 LAB
ALBUMIN SERPL BCP-MCNC: 3.9 G/DL (ref 3.5–5)
ALP SERPL-CCNC: 62 U/L (ref 46–116)
ALT SERPL W P-5'-P-CCNC: 25 U/L (ref 12–78)
ANION GAP SERPL CALCULATED.3IONS-SCNC: 6 MMOL/L (ref 4–13)
AST SERPL W P-5'-P-CCNC: 16 U/L (ref 5–45)
BASOPHILS # BLD AUTO: 0.05 THOUSANDS/ΜL (ref 0–0.1)
BASOPHILS NFR BLD AUTO: 1 % (ref 0–1)
BILIRUB SERPL-MCNC: 0.57 MG/DL (ref 0.2–1)
BUN SERPL-MCNC: 17 MG/DL (ref 5–25)
CALCIUM SERPL-MCNC: 8.8 MG/DL (ref 8.3–10.1)
CHLORIDE SERPL-SCNC: 106 MMOL/L (ref 100–108)
CHOLEST SERPL-MCNC: 183 MG/DL (ref 50–200)
CO2 SERPL-SCNC: 32 MMOL/L (ref 21–32)
CREAT SERPL-MCNC: 0.77 MG/DL (ref 0.6–1.3)
EOSINOPHIL # BLD AUTO: 0.19 THOUSAND/ΜL (ref 0–0.61)
EOSINOPHIL NFR BLD AUTO: 4 % (ref 0–6)
ERYTHROCYTE [DISTWIDTH] IN BLOOD BY AUTOMATED COUNT: 12.6 % (ref 11.6–15.1)
GFR SERPL CREATININE-BSD FRML MDRD: 79 ML/MIN/1.73SQ M
GLUCOSE P FAST SERPL-MCNC: 84 MG/DL (ref 65–99)
HCT VFR BLD AUTO: 43.5 % (ref 34.8–46.1)
HDLC SERPL-MCNC: 72 MG/DL
HGB BLD-MCNC: 14.4 G/DL (ref 11.5–15.4)
IMM GRANULOCYTES # BLD AUTO: 0.01 THOUSAND/UL (ref 0–0.2)
IMM GRANULOCYTES NFR BLD AUTO: 0 % (ref 0–2)
LDLC SERPL CALC-MCNC: 96 MG/DL (ref 0–100)
LYMPHOCYTES # BLD AUTO: 1.46 THOUSANDS/ΜL (ref 0.6–4.47)
LYMPHOCYTES NFR BLD AUTO: 31 % (ref 14–44)
MCH RBC QN AUTO: 31.8 PG (ref 26.8–34.3)
MCHC RBC AUTO-ENTMCNC: 33.1 G/DL (ref 31.4–37.4)
MCV RBC AUTO: 96 FL (ref 82–98)
MONOCYTES # BLD AUTO: 0.55 THOUSAND/ΜL (ref 0.17–1.22)
MONOCYTES NFR BLD AUTO: 12 % (ref 4–12)
NEUTROPHILS # BLD AUTO: 2.51 THOUSANDS/ΜL (ref 1.85–7.62)
NEUTS SEG NFR BLD AUTO: 52 % (ref 43–75)
NONHDLC SERPL-MCNC: 111 MG/DL
NRBC BLD AUTO-RTO: 0 /100 WBCS
PLATELET # BLD AUTO: 254 THOUSANDS/UL (ref 149–390)
PMV BLD AUTO: 8.8 FL (ref 8.9–12.7)
POTASSIUM SERPL-SCNC: 3.9 MMOL/L (ref 3.5–5.3)
PROT SERPL-MCNC: 7.1 G/DL (ref 6.4–8.2)
RBC # BLD AUTO: 4.53 MILLION/UL (ref 3.81–5.12)
SODIUM SERPL-SCNC: 144 MMOL/L (ref 136–145)
TRIGL SERPL-MCNC: 75 MG/DL
TSH SERPL DL<=0.05 MIU/L-ACNC: 3.83 UIU/ML (ref 0.36–3.74)
WBC # BLD AUTO: 4.77 THOUSAND/UL (ref 4.31–10.16)

## 2019-11-25 PROCEDURE — 85025 COMPLETE CBC W/AUTO DIFF WBC: CPT

## 2019-11-25 PROCEDURE — 84443 ASSAY THYROID STIM HORMONE: CPT

## 2019-11-25 PROCEDURE — 80061 LIPID PANEL: CPT

## 2019-11-25 PROCEDURE — 36415 COLL VENOUS BLD VENIPUNCTURE: CPT

## 2019-11-25 PROCEDURE — 80053 COMPREHEN METABOLIC PANEL: CPT

## 2019-11-26 ENCOUNTER — OFFICE VISIT (OUTPATIENT)
Dept: FAMILY MEDICINE CLINIC | Facility: CLINIC | Age: 69
End: 2019-11-26
Payer: MEDICARE

## 2019-11-26 VITALS
HEART RATE: 78 BPM | HEIGHT: 63 IN | SYSTOLIC BLOOD PRESSURE: 118 MMHG | DIASTOLIC BLOOD PRESSURE: 70 MMHG | RESPIRATION RATE: 16 BRPM | OXYGEN SATURATION: 98 % | BODY MASS INDEX: 32.96 KG/M2 | WEIGHT: 186 LBS

## 2019-11-26 DIAGNOSIS — I10 BENIGN ESSENTIAL HYPERTENSION: ICD-10-CM

## 2019-11-26 DIAGNOSIS — E03.9 HYPOTHYROIDISM, UNSPECIFIED TYPE: Primary | ICD-10-CM

## 2019-11-26 DIAGNOSIS — I10 ESSENTIAL HYPERTENSION: ICD-10-CM

## 2019-11-26 DIAGNOSIS — E78.5 HYPERLIPIDEMIA, UNSPECIFIED HYPERLIPIDEMIA TYPE: ICD-10-CM

## 2019-11-26 PROBLEM — J20.9 ACUTE BRONCHITIS: Status: RESOLVED | Noted: 2019-09-30 | Resolved: 2019-11-26

## 2019-11-26 PROCEDURE — 99214 OFFICE O/P EST MOD 30 MIN: CPT | Performed by: FAMILY MEDICINE

## 2019-11-26 RX ORDER — LOSARTAN POTASSIUM 25 MG/1
25 TABLET ORAL DAILY
Qty: 90 TABLET | Refills: 1 | Status: SHIPPED | OUTPATIENT
Start: 2019-11-26 | End: 2020-06-17

## 2019-11-26 RX ORDER — HYDROCHLOROTHIAZIDE 25 MG/1
25 TABLET ORAL DAILY
Qty: 90 TABLET | Refills: 1 | Status: SHIPPED | OUTPATIENT
Start: 2019-11-26 | End: 2020-07-01

## 2019-11-26 RX ORDER — LEVOTHYROXINE SODIUM 0.03 MG/1
25 TABLET ORAL DAILY
Qty: 90 TABLET | Refills: 1 | Status: SHIPPED | OUTPATIENT
Start: 2019-11-26 | End: 2020-05-18

## 2019-11-26 RX ORDER — SIMVASTATIN 40 MG
40 TABLET ORAL
Qty: 90 TABLET | Refills: 1 | Status: SHIPPED | OUTPATIENT
Start: 2019-11-26 | End: 2020-05-20

## 2019-11-26 NOTE — PROGRESS NOTES
Assessment/Plan:    Problem List Items Addressed This Visit        Endocrine    Hypothyroidism    Relevant Medications    levothyroxine 25 mcg tablet    Other Relevant Orders    TSH, 3rd generation TSH is borderline, will monitor and in 6 months will really check labs       Cardiovascular and Mediastinum    Benign essential hypertension    Relevant Medications    hydrochlorothiazide (HYDRODIURIL) 25 mg tablet    losartan (COZAAR) 25 mg tablet  Hypertension is stable she will continue same medication    Other Relevant Orders    CBC and differential    Comprehensive metabolic panel       Other    Hyperlipidemia    Relevant Medications    simvastatin (ZOCOR) 40 mg tablet    Other Relevant Orders    Lipid panel      Other Visit Diagnoses     Need for influenza vaccination    -  Primary    Essential hypertension        Relevant Medications    hydrochlorothiazide (HYDRODIURIL) 25 mg tablet    losartan (COZAAR) 25 mg tablet        She refused for the flu vaccine and the pneumonia vaccine  Chief Complaint   Patient presents with    Follow-up     BMI Counseling: Body mass index is 32 95 kg/m²  The BMI is above normal  Nutrition recommendations include decreasing portion sizes, encouraging healthy choices of fruits and vegetables, consuming healthier snacks, moderation in carbohydrate intake and reducing intake of cholesterol  Exercise recommendations include strength training exercises  Falls Plan of Care: balance, strength, and gait training instructions were provided  Subjective:   Patient ID: Jessy Buchanan is a 71 y o  female  She is here follow-up on labs, she has hypertension, hyperlipidemia and hypothyroid, she takes all her medicine regularly, she was sick few weeks ago and now she is better she denies any headache chest pain shortness of breath, she is retired, she does not want flu or pneumonia vaccine    She does not feel fatigue or hair loss she, she keeps herself active      Review of Systems Constitutional: Negative for activity change, appetite change, chills, diaphoresis, fatigue, fever and unexpected weight change  HENT: Negative for congestion, dental problem, ear discharge, ear pain, facial swelling, hearing loss, mouth sores, nosebleeds, postnasal drip, rhinorrhea, sinus pressure, sinus pain, sneezing, sore throat, trouble swallowing and voice change  Eyes: Negative for photophobia, pain, discharge, redness and itching  Respiratory: Negative for cough, chest tightness, shortness of breath and wheezing  Cardiovascular: Negative for chest pain, palpitations and leg swelling  Gastrointestinal: Negative for abdominal distention, abdominal pain, blood in stool, constipation, diarrhea and nausea  Endocrine: Negative for cold intolerance, heat intolerance, polydipsia, polyphagia and polyuria  Genitourinary: Negative for dysuria, flank pain, frequency, hematuria and urgency  Musculoskeletal: Negative for arthralgias, back pain, myalgias and neck pain  Skin: Negative for color change and pallor  Allergic/Immunologic: Negative for environmental allergies and food allergies  Neurological: Negative for dizziness, weakness, light-headedness, numbness and headaches  Hematological: Negative for adenopathy  Does not bruise/bleed easily  Psychiatric/Behavioral: Negative for behavioral problems, sleep disturbance and suicidal ideas  The patient is not nervous/anxious  Objective:  Physical Exam   Constitutional: She is oriented to person, place, and time  She appears well-developed and well-nourished  HENT:   Head: Normocephalic and atraumatic  Right Ear: External ear normal    Left Ear: External ear normal    Nose: Nose normal    Mouth/Throat: Oropharynx is clear and moist  No oropharyngeal exudate  Eyes: Pupils are equal, round, and reactive to light  Conjunctivae and EOM are normal  Right eye exhibits no discharge  Left eye exhibits no discharge  No scleral icterus  Neck: Normal range of motion  Neck supple  No tracheal deviation present  No thyromegaly present  Cardiovascular: Normal rate, regular rhythm and normal heart sounds  No murmur heard  Pulmonary/Chest: Effort normal and breath sounds normal  No respiratory distress  She has no wheezes  She has no rales  Abdominal: Soft  Bowel sounds are normal  She exhibits no distension and no mass  There is no tenderness  There is no rebound  Musculoskeletal: Normal range of motion  She exhibits no edema  Lymphadenopathy:     She has no cervical adenopathy  Neurological: She is alert and oriented to person, place, and time  She has normal reflexes  No cranial nerve deficit  Skin: Skin is warm  No rash noted  No erythema  No pallor  Psychiatric: She has a normal mood and affect  Her behavior is normal  Judgment and thought content normal    Nursing note and vitals reviewed  Past Surgical History:   Procedure Laterality Date    BREAST BIOPSY Right 04/26/2012    COLONOSCOPY N/A 3/20/2019    Procedure: COLONOSCOPY;  Surgeon: Aishwarya Walton MD;  [facility]; Service: Gastroenterology    TONSILLECTOMY         History reviewed  No pertinent family history        Current Outpatient Medications:     hydrochlorothiazide (HYDRODIURIL) 25 mg tablet, Take 1 tablet (25 mg total) by mouth daily, Disp: 90 tablet, Rfl: 1    levothyroxine 25 mcg tablet, Take 1 tablet (25 mcg total) by mouth daily, Disp: 90 tablet, Rfl: 1    losartan (COZAAR) 25 mg tablet, Take 1 tablet (25 mg total) by mouth daily, Disp: 90 tablet, Rfl: 1    simvastatin (ZOCOR) 40 mg tablet, Take 1 tablet (40 mg total) by mouth daily at bedtime, Disp: 90 tablet, Rfl: 1    Allergies   Allergen Reactions    Nickel Rash       Vitals:    11/26/19 1442   BP: 118/70   Pulse: 78   Resp: 16   SpO2: 98%   Weight: 84 4 kg (186 lb)   Height: 5' 3" (1 6 m)

## 2020-05-18 DIAGNOSIS — E03.9 HYPOTHYROIDISM, UNSPECIFIED TYPE: ICD-10-CM

## 2020-05-18 RX ORDER — LEVOTHYROXINE SODIUM 0.03 MG/1
TABLET ORAL
Qty: 90 TABLET | Refills: 1 | Status: SHIPPED | OUTPATIENT
Start: 2020-05-18 | End: 2020-11-12

## 2020-05-20 DIAGNOSIS — E78.5 HYPERLIPIDEMIA, UNSPECIFIED HYPERLIPIDEMIA TYPE: ICD-10-CM

## 2020-05-20 RX ORDER — SIMVASTATIN 40 MG
TABLET ORAL
Qty: 90 TABLET | Refills: 1 | Status: SHIPPED | OUTPATIENT
Start: 2020-05-20 | End: 2020-11-12

## 2020-05-30 ENCOUNTER — APPOINTMENT (OUTPATIENT)
Dept: LAB | Facility: CLINIC | Age: 70
End: 2020-05-30
Payer: MEDICARE

## 2020-05-30 ENCOUNTER — TRANSCRIBE ORDERS (OUTPATIENT)
Dept: LAB | Facility: CLINIC | Age: 70
End: 2020-05-30

## 2020-05-30 DIAGNOSIS — E03.9 HYPOTHYROIDISM, UNSPECIFIED TYPE: ICD-10-CM

## 2020-05-30 DIAGNOSIS — I10 BENIGN ESSENTIAL HYPERTENSION: ICD-10-CM

## 2020-05-30 DIAGNOSIS — E78.5 HYPERLIPIDEMIA, UNSPECIFIED HYPERLIPIDEMIA TYPE: ICD-10-CM

## 2020-05-30 LAB
ALBUMIN SERPL BCP-MCNC: 3.9 G/DL (ref 3.5–5)
ALP SERPL-CCNC: 42 U/L (ref 46–116)
ALT SERPL W P-5'-P-CCNC: 20 U/L (ref 12–78)
ANION GAP SERPL CALCULATED.3IONS-SCNC: 8 MMOL/L (ref 4–13)
AST SERPL W P-5'-P-CCNC: 16 U/L (ref 5–45)
BASOPHILS # BLD AUTO: 0.03 THOUSANDS/ΜL (ref 0–0.1)
BASOPHILS NFR BLD AUTO: 1 % (ref 0–1)
BILIRUB SERPL-MCNC: 0.41 MG/DL (ref 0.2–1)
BUN SERPL-MCNC: 14 MG/DL (ref 5–25)
CALCIUM SERPL-MCNC: 8.7 MG/DL (ref 8.3–10.1)
CHLORIDE SERPL-SCNC: 105 MMOL/L (ref 100–108)
CHOLEST SERPL-MCNC: 185 MG/DL (ref 50–200)
CO2 SERPL-SCNC: 28 MMOL/L (ref 21–32)
CREAT SERPL-MCNC: 0.8 MG/DL (ref 0.6–1.3)
EOSINOPHIL # BLD AUTO: 0.11 THOUSAND/ΜL (ref 0–0.61)
EOSINOPHIL NFR BLD AUTO: 2 % (ref 0–6)
ERYTHROCYTE [DISTWIDTH] IN BLOOD BY AUTOMATED COUNT: 13.1 % (ref 11.6–15.1)
GFR SERPL CREATININE-BSD FRML MDRD: 75 ML/MIN/1.73SQ M
GLUCOSE P FAST SERPL-MCNC: 89 MG/DL (ref 65–99)
HCT VFR BLD AUTO: 43.7 % (ref 34.8–46.1)
HDLC SERPL-MCNC: 62 MG/DL
HGB BLD-MCNC: 14.4 G/DL (ref 11.5–15.4)
IMM GRANULOCYTES # BLD AUTO: 0.01 THOUSAND/UL (ref 0–0.2)
IMM GRANULOCYTES NFR BLD AUTO: 0 % (ref 0–2)
LDLC SERPL CALC-MCNC: 102 MG/DL (ref 0–100)
LYMPHOCYTES # BLD AUTO: 1.28 THOUSANDS/ΜL (ref 0.6–4.47)
LYMPHOCYTES NFR BLD AUTO: 28 % (ref 14–44)
MCH RBC QN AUTO: 31.6 PG (ref 26.8–34.3)
MCHC RBC AUTO-ENTMCNC: 33 G/DL (ref 31.4–37.4)
MCV RBC AUTO: 96 FL (ref 82–98)
MONOCYTES # BLD AUTO: 0.56 THOUSAND/ΜL (ref 0.17–1.22)
MONOCYTES NFR BLD AUTO: 12 % (ref 4–12)
NEUTROPHILS # BLD AUTO: 2.56 THOUSANDS/ΜL (ref 1.85–7.62)
NEUTS SEG NFR BLD AUTO: 57 % (ref 43–75)
NONHDLC SERPL-MCNC: 123 MG/DL
NRBC BLD AUTO-RTO: 0 /100 WBCS
PLATELET # BLD AUTO: 257 THOUSANDS/UL (ref 149–390)
PMV BLD AUTO: 9.1 FL (ref 8.9–12.7)
POTASSIUM SERPL-SCNC: 4.2 MMOL/L (ref 3.5–5.3)
PROT SERPL-MCNC: 7.3 G/DL (ref 6.4–8.2)
RBC # BLD AUTO: 4.56 MILLION/UL (ref 3.81–5.12)
SODIUM SERPL-SCNC: 141 MMOL/L (ref 136–145)
TRIGL SERPL-MCNC: 103 MG/DL
TSH SERPL DL<=0.05 MIU/L-ACNC: 2.81 UIU/ML (ref 0.36–3.74)
WBC # BLD AUTO: 4.55 THOUSAND/UL (ref 4.31–10.16)

## 2020-05-30 PROCEDURE — 36415 COLL VENOUS BLD VENIPUNCTURE: CPT

## 2020-05-30 PROCEDURE — 85025 COMPLETE CBC W/AUTO DIFF WBC: CPT

## 2020-05-30 PROCEDURE — 80061 LIPID PANEL: CPT

## 2020-05-30 PROCEDURE — 84443 ASSAY THYROID STIM HORMONE: CPT

## 2020-05-30 PROCEDURE — 80053 COMPREHEN METABOLIC PANEL: CPT

## 2020-06-02 ENCOUNTER — TELEMEDICINE (OUTPATIENT)
Dept: FAMILY MEDICINE CLINIC | Facility: CLINIC | Age: 70
End: 2020-06-02
Payer: MEDICARE

## 2020-06-02 VITALS — WEIGHT: 184 LBS | BODY MASS INDEX: 32.59 KG/M2

## 2020-06-02 DIAGNOSIS — Z11.59 NEED FOR HEPATITIS C SCREENING TEST: ICD-10-CM

## 2020-06-02 DIAGNOSIS — E03.9 HYPOTHYROIDISM, UNSPECIFIED TYPE: ICD-10-CM

## 2020-06-02 DIAGNOSIS — E78.5 HYPERLIPIDEMIA, UNSPECIFIED HYPERLIPIDEMIA TYPE: ICD-10-CM

## 2020-06-02 DIAGNOSIS — I10 BENIGN ESSENTIAL HYPERTENSION: Primary | ICD-10-CM

## 2020-06-02 PROCEDURE — 99213 OFFICE O/P EST LOW 20 MIN: CPT | Performed by: FAMILY MEDICINE

## 2020-06-17 DIAGNOSIS — I10 ESSENTIAL HYPERTENSION: ICD-10-CM

## 2020-06-17 RX ORDER — LOSARTAN POTASSIUM 25 MG/1
TABLET ORAL
Qty: 90 TABLET | Refills: 1 | Status: SHIPPED | OUTPATIENT
Start: 2020-06-17 | End: 2020-12-18

## 2020-07-01 DIAGNOSIS — I10 BENIGN ESSENTIAL HYPERTENSION: ICD-10-CM

## 2020-07-01 RX ORDER — HYDROCHLOROTHIAZIDE 25 MG/1
TABLET ORAL
Qty: 90 TABLET | Refills: 1 | Status: SHIPPED | OUTPATIENT
Start: 2020-07-01 | End: 2020-12-20

## 2020-11-12 DIAGNOSIS — E03.9 HYPOTHYROIDISM, UNSPECIFIED TYPE: ICD-10-CM

## 2020-11-12 DIAGNOSIS — E78.5 HYPERLIPIDEMIA, UNSPECIFIED HYPERLIPIDEMIA TYPE: ICD-10-CM

## 2020-11-12 RX ORDER — SIMVASTATIN 40 MG
TABLET ORAL
Qty: 90 TABLET | Refills: 1 | Status: SHIPPED | OUTPATIENT
Start: 2020-11-12 | End: 2021-05-19 | Stop reason: SDUPTHER

## 2020-11-12 RX ORDER — LEVOTHYROXINE SODIUM 0.03 MG/1
TABLET ORAL
Qty: 90 TABLET | Refills: 1 | Status: SHIPPED | OUTPATIENT
Start: 2020-11-12 | End: 2021-05-10

## 2020-12-04 ENCOUNTER — LAB (OUTPATIENT)
Dept: LAB | Facility: CLINIC | Age: 70
End: 2020-12-04
Payer: MEDICARE

## 2020-12-04 DIAGNOSIS — I10 BENIGN ESSENTIAL HYPERTENSION: ICD-10-CM

## 2020-12-04 DIAGNOSIS — E03.9 HYPOTHYROIDISM, UNSPECIFIED TYPE: ICD-10-CM

## 2020-12-04 DIAGNOSIS — Z11.59 NEED FOR HEPATITIS C SCREENING TEST: ICD-10-CM

## 2020-12-04 DIAGNOSIS — E78.5 HYPERLIPIDEMIA, UNSPECIFIED HYPERLIPIDEMIA TYPE: ICD-10-CM

## 2020-12-04 LAB
ALBUMIN SERPL BCP-MCNC: 3.9 G/DL (ref 3.5–5)
ALP SERPL-CCNC: 52 U/L (ref 46–116)
ALT SERPL W P-5'-P-CCNC: 23 U/L (ref 12–78)
ANION GAP SERPL CALCULATED.3IONS-SCNC: 9 MMOL/L (ref 4–13)
AST SERPL W P-5'-P-CCNC: 12 U/L (ref 5–45)
BASOPHILS # BLD AUTO: 0.04 THOUSANDS/ΜL (ref 0–0.1)
BASOPHILS NFR BLD AUTO: 1 % (ref 0–1)
BILIRUB SERPL-MCNC: 0.61 MG/DL (ref 0.2–1)
BUN SERPL-MCNC: 16 MG/DL (ref 5–25)
CALCIUM SERPL-MCNC: 9.3 MG/DL (ref 8.3–10.1)
CHLORIDE SERPL-SCNC: 104 MMOL/L (ref 100–108)
CHOLEST SERPL-MCNC: 172 MG/DL (ref 50–200)
CO2 SERPL-SCNC: 28 MMOL/L (ref 21–32)
CREAT SERPL-MCNC: 0.84 MG/DL (ref 0.6–1.3)
CREAT UR-MCNC: 199 MG/DL
EOSINOPHIL # BLD AUTO: 0.1 THOUSAND/ΜL (ref 0–0.61)
EOSINOPHIL NFR BLD AUTO: 2 % (ref 0–6)
ERYTHROCYTE [DISTWIDTH] IN BLOOD BY AUTOMATED COUNT: 12.6 % (ref 11.6–15.1)
GFR SERPL CREATININE-BSD FRML MDRD: 71 ML/MIN/1.73SQ M
GLUCOSE P FAST SERPL-MCNC: 89 MG/DL (ref 65–99)
HCT VFR BLD AUTO: 43.1 % (ref 34.8–46.1)
HCV AB SER QL: NORMAL
HDLC SERPL-MCNC: 75 MG/DL
HGB BLD-MCNC: 14.3 G/DL (ref 11.5–15.4)
IMM GRANULOCYTES # BLD AUTO: 0.03 THOUSAND/UL (ref 0–0.2)
IMM GRANULOCYTES NFR BLD AUTO: 1 % (ref 0–2)
LDLC SERPL CALC-MCNC: 82 MG/DL (ref 0–100)
LYMPHOCYTES # BLD AUTO: 1.43 THOUSANDS/ΜL (ref 0.6–4.47)
LYMPHOCYTES NFR BLD AUTO: 29 % (ref 14–44)
MCH RBC QN AUTO: 32.4 PG (ref 26.8–34.3)
MCHC RBC AUTO-ENTMCNC: 33.2 G/DL (ref 31.4–37.4)
MCV RBC AUTO: 98 FL (ref 82–98)
MICROALBUMIN UR-MCNC: 41.5 MG/L (ref 0–20)
MICROALBUMIN/CREAT 24H UR: 21 MG/G CREATININE (ref 0–30)
MONOCYTES # BLD AUTO: 0.56 THOUSAND/ΜL (ref 0.17–1.22)
MONOCYTES NFR BLD AUTO: 12 % (ref 4–12)
NEUTROPHILS # BLD AUTO: 2.7 THOUSANDS/ΜL (ref 1.85–7.62)
NEUTS SEG NFR BLD AUTO: 55 % (ref 43–75)
NONHDLC SERPL-MCNC: 97 MG/DL
NRBC BLD AUTO-RTO: 0 /100 WBCS
PLATELET # BLD AUTO: 250 THOUSANDS/UL (ref 149–390)
PMV BLD AUTO: 8.7 FL (ref 8.9–12.7)
POTASSIUM SERPL-SCNC: 4.3 MMOL/L (ref 3.5–5.3)
PROT SERPL-MCNC: 7.2 G/DL (ref 6.4–8.2)
RBC # BLD AUTO: 4.42 MILLION/UL (ref 3.81–5.12)
SODIUM SERPL-SCNC: 141 MMOL/L (ref 136–145)
TRIGL SERPL-MCNC: 76 MG/DL
TSH SERPL DL<=0.05 MIU/L-ACNC: 3.22 UIU/ML (ref 0.36–3.74)
WBC # BLD AUTO: 4.86 THOUSAND/UL (ref 4.31–10.16)

## 2020-12-04 PROCEDURE — 84443 ASSAY THYROID STIM HORMONE: CPT

## 2020-12-04 PROCEDURE — 80053 COMPREHEN METABOLIC PANEL: CPT

## 2020-12-04 PROCEDURE — 82570 ASSAY OF URINE CREATININE: CPT | Performed by: FAMILY MEDICINE

## 2020-12-04 PROCEDURE — 85025 COMPLETE CBC W/AUTO DIFF WBC: CPT

## 2020-12-04 PROCEDURE — 86803 HEPATITIS C AB TEST: CPT

## 2020-12-04 PROCEDURE — 36415 COLL VENOUS BLD VENIPUNCTURE: CPT

## 2020-12-04 PROCEDURE — 82043 UR ALBUMIN QUANTITATIVE: CPT | Performed by: FAMILY MEDICINE

## 2020-12-04 PROCEDURE — 80061 LIPID PANEL: CPT

## 2020-12-18 DIAGNOSIS — I10 ESSENTIAL HYPERTENSION: ICD-10-CM

## 2020-12-18 RX ORDER — LOSARTAN POTASSIUM 25 MG/1
TABLET ORAL
Qty: 90 TABLET | Refills: 1 | Status: SHIPPED | OUTPATIENT
Start: 2020-12-18 | End: 2021-05-19 | Stop reason: SDUPTHER

## 2020-12-19 DIAGNOSIS — I10 BENIGN ESSENTIAL HYPERTENSION: ICD-10-CM

## 2020-12-20 RX ORDER — HYDROCHLOROTHIAZIDE 25 MG/1
TABLET ORAL
Qty: 90 TABLET | Refills: 1 | Status: SHIPPED | OUTPATIENT
Start: 2020-12-20 | End: 2021-05-19 | Stop reason: SDUPTHER

## 2021-02-19 ENCOUNTER — OFFICE VISIT (OUTPATIENT)
Dept: FAMILY MEDICINE CLINIC | Facility: CLINIC | Age: 71
End: 2021-02-19
Payer: MEDICARE

## 2021-02-19 VITALS
BODY MASS INDEX: 33.24 KG/M2 | HEIGHT: 63 IN | HEART RATE: 95 BPM | RESPIRATION RATE: 18 BRPM | WEIGHT: 187.6 LBS | DIASTOLIC BLOOD PRESSURE: 82 MMHG | OXYGEN SATURATION: 97 % | SYSTOLIC BLOOD PRESSURE: 122 MMHG

## 2021-02-19 DIAGNOSIS — H61.22 IMPACTED CERUMEN OF LEFT EAR: Primary | ICD-10-CM

## 2021-02-19 PROCEDURE — 69209 REMOVE IMPACTED EAR WAX UNI: CPT | Performed by: FAMILY MEDICINE

## 2021-02-19 PROCEDURE — 99213 OFFICE O/P EST LOW 20 MIN: CPT | Performed by: FAMILY MEDICINE

## 2021-02-19 NOTE — PROGRESS NOTES
Assessment/Plan:   Diagnoses and all orders for this visit:    Impacted cerumen of left ear  -     carbamide peroxide (DEBROX) 6 5 % otic solution; Administer 5 drops into the left ear 2 (two) times a day  - irrigated in office - pt tolerated procedure well - hearing improved   - advised against using Qtips as can further impact cerumen   - advised Debrox gtts  - RTO PRN     Other orders  -     Ear cerumen removal          Subjective:    Patient ID: Carmenza Gauthier is a 79 y o  female  HPI  67yo F presents to the office for eval and treatment of L-ear cerumen impaction   - (+) decreased hearing from L-side  - does use Qtips   - has also tried Peroxide, Water-pic w/o relief   - denies F/C/N/V/discharge from ear     The following portions of the patient's history were reviewed and updated as appropriate: allergies, current medications, past family history, past medical history, past social history, past surgical history and problem list     Review of Systems  as per HPI    Objective:  /82   Pulse 95   Resp 18   Ht 5' 3" (1 6 m)   Wt 85 1 kg (187 lb 9 6 oz)   SpO2 97%   BMI 33 23 kg/m²    Physical Exam  Vitals signs reviewed  Constitutional:       General: She is not in acute distress  Appearance: She is obese  She is not ill-appearing, toxic-appearing or diaphoretic  HENT:      Head: Normocephalic and atraumatic  Right Ear: Tympanic membrane, ear canal and external ear normal  There is no impacted cerumen  Left Ear: External ear normal  There is impacted cerumen  Eyes:      General: No scleral icterus  Right eye: No discharge  Left eye: No discharge  Extraocular Movements: Extraocular movements intact  Conjunctiva/sclera: Conjunctivae normal    Neck:      Musculoskeletal: Normal range of motion and neck supple  Musculoskeletal: Normal range of motion  Skin:     General: Skin is warm  Neurological:      General: No focal deficit present        Mental Status: She is alert and oriented to person, place, and time  Psychiatric:         Mood and Affect: Mood normal          Behavior: Behavior normal          BMI Counseling: Body mass index is 33 23 kg/m²  The BMI is above normal  Nutrition recommendations include reducing portion sizes and decreasing overall calorie intake  Exercise recommendations include exercising 3-5 times per week  Ear cerumen removal    Date/Time: 2/19/2021 9:36 AM  Performed by: Mayuri De La Cruz DO  Authorized by: Mayuri De La Cruz DO   Universal Protocol:  Consent: Verbal consent obtained  Risks and benefits: risks, benefits and alternatives were discussed  Consent given by: patient  Time out: Immediately prior to procedure a "time out" was called to verify the correct patient, procedure, equipment, support staff and site/side marked as required  Timeout called at: 2/19/2021 9:36 AM   Patient understanding: patient states understanding of the procedure being performed      Patient location:  Clinic  Procedure details:     Local anesthetic:  None    Location:  L ear    Procedure type: irrigation only      Approach:  Natural orifice  Post-procedure details:     Complication:  None    Hearing quality:  Improved    Patient tolerance of procedure:   Tolerated well, no immediate complications

## 2021-03-04 DIAGNOSIS — Z23 ENCOUNTER FOR IMMUNIZATION: ICD-10-CM

## 2021-03-09 ENCOUNTER — IMMUNIZATIONS (OUTPATIENT)
Dept: FAMILY MEDICINE CLINIC | Facility: HOSPITAL | Age: 71
End: 2021-03-09

## 2021-03-09 DIAGNOSIS — Z23 ENCOUNTER FOR IMMUNIZATION: Primary | ICD-10-CM

## 2021-03-09 PROCEDURE — 0001A SARS-COV-2 / COVID-19 MRNA VACCINE (PFIZER-BIONTECH) 30 MCG: CPT

## 2021-03-09 PROCEDURE — 91300 SARS-COV-2 / COVID-19 MRNA VACCINE (PFIZER-BIONTECH) 30 MCG: CPT

## 2021-03-18 ENCOUNTER — OFFICE VISIT (OUTPATIENT)
Dept: OBGYN CLINIC | Facility: CLINIC | Age: 71
End: 2021-03-18
Payer: MEDICARE

## 2021-03-18 ENCOUNTER — APPOINTMENT (OUTPATIENT)
Dept: RADIOLOGY | Facility: AMBULARY SURGERY CENTER | Age: 71
End: 2021-03-18
Attending: ORTHOPAEDIC SURGERY
Payer: MEDICARE

## 2021-03-18 VITALS
WEIGHT: 182 LBS | HEIGHT: 63 IN | HEART RATE: 83 BPM | SYSTOLIC BLOOD PRESSURE: 131 MMHG | BODY MASS INDEX: 32.25 KG/M2 | DIASTOLIC BLOOD PRESSURE: 85 MMHG

## 2021-03-18 DIAGNOSIS — M25.522 PAIN IN LEFT ELBOW: ICD-10-CM

## 2021-03-18 DIAGNOSIS — M77.02 MEDIAL EPICONDYLITIS OF LEFT ELBOW: Primary | ICD-10-CM

## 2021-03-18 PROCEDURE — 99214 OFFICE O/P EST MOD 30 MIN: CPT | Performed by: ORTHOPAEDIC SURGERY

## 2021-03-18 PROCEDURE — 73080 X-RAY EXAM OF ELBOW: CPT

## 2021-03-18 NOTE — PROGRESS NOTES
Assessment:  1  Medial epicondylitis of left elbow  Ambulatory referral to PT/OT hand therapy    Diclofenac Sodium (VOLTAREN) 1 %    Cock Up Wrist Splint   2  Pain in left elbow  XR elbow 3+ vw left    Ambulatory referral to PT/OT hand therapy    Diclofenac Sodium (VOLTAREN) 1 %    Cock Up Wrist Splint     Patient Active Problem List   Diagnosis    Benign essential hypertension    Excess or deficiency of vitamin D    Hyperlipidemia    Hypothyroidism    Screening for cardiovascular condition    Screen for colon cancer    Medicare annual wellness visit, subsequent           Plan       we offered cortisone injection patient declined at this time  We talked about PRP injection as well can address this further at a later time as well  I did tell her that physical therapy is the mainstay of the treatment here we will order physical therapy  We will also order a universal wrist splint to stop her from irritating as much  And I did talk to her about anti-inflammatories that are topical and she elected to proceed  Subjective:     Patient ID:    Chief Complaint:Shraddha Araiza 79 y o  female      HPI    Patient comes in today with regards to Medial aspect of left elbow  The patient reports that the pain is in the  Medial aspect and has been going on for  2-3  months  The pain is rated at0 at its best and5 at its worst   The pain is described as annoying  It is worsened with  Mopping and lift, and is made better with  uncertain  The patient has taken  Aleve for treatment  No previous issues in this elbow  Patient is right-hand        The following portions of the patient's history were reviewed and updated as appropriate: allergies, current medications, past family history, past social history, past surgical history and problem list         Social History     Socioeconomic History    Marital status: /Civil Union     Spouse name: Not on file    Number of children: Not on file    Years of education: Not on file    Highest education level: Not on file   Occupational History    Not on file   Social Needs    Financial resource strain: Not on file    Food insecurity     Worry: Not on file     Inability: Not on file    Transportation needs     Medical: Not on file     Non-medical: Not on file   Tobacco Use    Smoking status: Never Smoker    Smokeless tobacco: Never Used   Substance and Sexual Activity    Alcohol use: Yes     Frequency: 4 or more times a week     Drinks per session: 1 or 2    Drug use: Never    Sexual activity: Not on file   Lifestyle    Physical activity     Days per week: Not on file     Minutes per session: Not on file    Stress: Not on file   Relationships    Social connections     Talks on phone: Not on file     Gets together: Not on file     Attends Restoration service: Not on file     Active member of club or organization: Not on file     Attends meetings of clubs or organizations: Not on file     Relationship status: Not on file    Intimate partner violence     Fear of current or ex partner: Not on file     Emotionally abused: Not on file     Physically abused: Not on file     Forced sexual activity: Not on file   Other Topics Concern    Not on file   Social History Narrative    Not on file     Past Medical History:   Diagnosis Date    Disease of thyroid gland     Hyperlipidemia      Past Surgical History:   Procedure Laterality Date    BREAST BIOPSY Right 04/26/2012    COLONOSCOPY N/A 3/20/2019    Procedure: COLONOSCOPY;  Surgeon: Melanie Corado MD;  [facility];   Service: Gastroenterology    TONSILLECTOMY       Allergies   Allergen Reactions    Nickel Rash     Current Outpatient Medications on File Prior to Visit   Medication Sig Dispense Refill    carbamide peroxide (DEBROX) 6 5 % otic solution Administer 5 drops into the left ear 2 (two) times a day 15 mL 0    hydrochlorothiazide (HYDRODIURIL) 25 mg tablet TAKE 1 TABLET BY MOUTH EVERY DAY 90 tablet 1    levothyroxine 25 mcg tablet TAKE 1 TABLET BY MOUTH EVERY DAY 90 tablet 1    losartan (COZAAR) 25 mg tablet TAKE 1 TABLET BY MOUTH EVERY DAY 90 tablet 1    simvastatin (ZOCOR) 40 mg tablet TAKE 1 TABLET BY MOUTH EVERYDAY AT BEDTIME 90 tablet 1     No current facility-administered medications on file prior to visit  Objective:    Review of Systems   Constitutional: Negative  HENT: Negative  Eyes: Negative  Respiratory: Negative  Cardiovascular: Negative  Gastrointestinal: Negative  Endocrine: Negative  Genitourinary: Negative  Musculoskeletal:        See HPI   Skin: Negative  Allergic/Immunologic: Negative  Neurological: Negative  Hematological: Negative  Psychiatric/Behavioral: Negative  Right Elbow Exam   Right elbow exam is normal     Range of Motion   The patient has normal right elbow ROM  Muscle Strength   The patient has normal right elbow strength  Left Elbow Exam     Tenderness   The patient is experiencing tenderness in the medial epicondyle  Range of Motion   The patient has normal left elbow ROM  Muscle Strength   The patient has normal left elbow strength  Pronation:  5/5   Supination:  4/5     Tests   Varus: negative  Valgus: negative    Other   Erythema: absent  Sensation: normal  Pulse: present            Physical Exam  Vitals signs and nursing note reviewed  Constitutional:       Appearance: She is well-developed  HENT:      Head: Normocephalic  Eyes:      Pupils: Pupils are equal, round, and reactive to light  Neck:      Musculoskeletal: Normal range of motion  Cardiovascular:      Rate and Rhythm: Normal rate  Pulmonary:      Effort: Pulmonary effort is normal    Abdominal:      General: There is no distension  Skin:     General: Skin is warm  Neurological:      Mental Status: She is alert and oriented to person, place, and time           Procedures  No Procedures performed today    I have personally reviewed pertinent films in PACS and my interpretation is No osseous issue  Portions of the record may have been created with voice recognition software   Occasional wrong word or "sound a like" substitutions may have occurred due to the inherent limitations of voice recognition software   Read the chart carefully and recognize, using context, where substitutions have occurred

## 2021-03-29 ENCOUNTER — IMMUNIZATIONS (OUTPATIENT)
Dept: FAMILY MEDICINE CLINIC | Facility: HOSPITAL | Age: 71
End: 2021-03-29

## 2021-03-29 DIAGNOSIS — Z23 ENCOUNTER FOR IMMUNIZATION: Primary | ICD-10-CM

## 2021-03-29 PROCEDURE — 91300 SARS-COV-2 / COVID-19 MRNA VACCINE (PFIZER-BIONTECH) 30 MCG: CPT

## 2021-03-29 PROCEDURE — 0002A SARS-COV-2 / COVID-19 MRNA VACCINE (PFIZER-BIONTECH) 30 MCG: CPT

## 2021-05-06 DIAGNOSIS — E78.5 HYPERLIPIDEMIA, UNSPECIFIED HYPERLIPIDEMIA TYPE: ICD-10-CM

## 2021-05-06 RX ORDER — SIMVASTATIN 40 MG
TABLET ORAL
Qty: 90 TABLET | Refills: 1 | OUTPATIENT
Start: 2021-05-06

## 2021-05-06 NOTE — TELEPHONE ENCOUNTER
PLEASE REFUSE MED SHE DOES NOT NEED AT THIS TIME  [address] FOR 5/19/2021 FOR SUB AWV AND DM FOLLOW UP

## 2021-05-08 DIAGNOSIS — E03.9 HYPOTHYROIDISM, UNSPECIFIED TYPE: ICD-10-CM

## 2021-05-10 RX ORDER — LEVOTHYROXINE SODIUM 0.03 MG/1
TABLET ORAL
Qty: 90 TABLET | Refills: 1 | Status: SHIPPED | OUTPATIENT
Start: 2021-05-10 | End: 2021-05-19 | Stop reason: SDUPTHER

## 2021-05-19 ENCOUNTER — OFFICE VISIT (OUTPATIENT)
Dept: FAMILY MEDICINE CLINIC | Facility: CLINIC | Age: 71
End: 2021-05-19
Payer: MEDICARE

## 2021-05-19 VITALS
HEART RATE: 88 BPM | WEIGHT: 181 LBS | DIASTOLIC BLOOD PRESSURE: 68 MMHG | BODY MASS INDEX: 32.07 KG/M2 | OXYGEN SATURATION: 96 % | SYSTOLIC BLOOD PRESSURE: 118 MMHG | HEIGHT: 63 IN

## 2021-05-19 DIAGNOSIS — E78.5 HYPERLIPIDEMIA, UNSPECIFIED HYPERLIPIDEMIA TYPE: ICD-10-CM

## 2021-05-19 DIAGNOSIS — Z00.00 MEDICARE ANNUAL WELLNESS VISIT, SUBSEQUENT: Primary | ICD-10-CM

## 2021-05-19 DIAGNOSIS — I10 ESSENTIAL HYPERTENSION: ICD-10-CM

## 2021-05-19 DIAGNOSIS — Z78.0 MENOPAUSE: ICD-10-CM

## 2021-05-19 DIAGNOSIS — E03.9 HYPOTHYROIDISM, UNSPECIFIED TYPE: ICD-10-CM

## 2021-05-19 DIAGNOSIS — Z12.31 ENCOUNTER FOR SCREENING MAMMOGRAM FOR BREAST CANCER: ICD-10-CM

## 2021-05-19 DIAGNOSIS — I10 BENIGN ESSENTIAL HYPERTENSION: ICD-10-CM

## 2021-05-19 PROCEDURE — G0438 PPPS, INITIAL VISIT: HCPCS | Performed by: FAMILY MEDICINE

## 2021-05-19 PROCEDURE — 99214 OFFICE O/P EST MOD 30 MIN: CPT | Performed by: FAMILY MEDICINE

## 2021-05-19 PROCEDURE — 1123F ACP DISCUSS/DSCN MKR DOCD: CPT | Performed by: FAMILY MEDICINE

## 2021-05-19 RX ORDER — LOSARTAN POTASSIUM 25 MG/1
25 TABLET ORAL DAILY
Qty: 90 TABLET | Refills: 3 | Status: SHIPPED | OUTPATIENT
Start: 2021-05-19 | End: 2022-06-09

## 2021-05-19 RX ORDER — LEVOTHYROXINE SODIUM 0.03 MG/1
25 TABLET ORAL DAILY
Qty: 90 TABLET | Refills: 3 | Status: SHIPPED | OUTPATIENT
Start: 2021-05-19 | End: 2022-06-13

## 2021-05-19 RX ORDER — SIMVASTATIN 40 MG
40 TABLET ORAL
Qty: 90 TABLET | Refills: 3 | Status: SHIPPED | OUTPATIENT
Start: 2021-05-19 | End: 2022-05-25

## 2021-05-19 RX ORDER — HYDROCHLOROTHIAZIDE 25 MG/1
25 TABLET ORAL DAILY
Qty: 90 TABLET | Refills: 3 | Status: SHIPPED | OUTPATIENT
Start: 2021-05-19 | End: 2022-06-09

## 2021-05-19 NOTE — PROGRESS NOTES
Assessment/Plan:    Problem List Items Addressed This Visit        Endocrine    Hypothyroidism     Continue same dose and refill is given  And advised to have repeat labs         Relevant Medications    levothyroxine 25 mcg tablet    Other Relevant Orders    TSH, 3rd generation       Cardiovascular and Mediastinum    Essential hypertension     Hypertension is stable, continue same medication         Relevant Medications    hydrochlorothiazide (HYDRODIURIL) 25 mg tablet    losartan (COZAAR) 25 mg tablet       Other    Hyperlipidemia     Continue simvastatin and low-fat diet         Relevant Medications    simvastatin (ZOCOR) 40 mg tablet    Other Relevant Orders    Lipid panel    Encounter for screening mammogram for breast cancer - Primary    Relevant Orders    Mammo screening bilateral w 3d & cad    Menopause    Relevant Orders    DXA bone density spine hip and pelvis           Chief Complaint   Patient presents with    Follow-up       Subjective:   Patient ID: Silvia Calvert is a 79 y o  female  Thyroid Problem  Presents for follow-up visit  Patient reports no anxiety, cold intolerance, constipation, dry skin, fatigue, leg swelling, palpitations, tremors or weight gain  The symptoms have been stable  Her past medical history is significant for hyperlipidemia  Hyperlipidemia  This is a chronic problem  Recent lipid tests were reviewed and are normal  She has no history of chronic renal disease  There are no known factors aggravating her hyperlipidemia  Pertinent negatives include no chest pain  Current antihyperlipidemic treatment includes statins  The current treatment provides significant improvement of lipids  Risk factors for coronary artery disease include hypertension  Hypertension  This is a chronic problem  The problem is unchanged  The problem is controlled  Pertinent negatives include no anxiety, chest pain, headaches or palpitations   Risk factors for coronary artery disease include dyslipidemia  There is no history of angina or kidney disease  Identifiable causes of hypertension include a thyroid problem  There is no history of chronic renal disease  Review of Systems   Constitutional: Negative for fatigue and weight gain  Cardiovascular: Negative for chest pain and palpitations  Gastrointestinal: Negative for constipation  Endocrine: Negative for cold intolerance  Neurological: Negative for tremors and headaches  Psychiatric/Behavioral: The patient is not nervous/anxious  Objective:  Physical Exam  Vitals signs and nursing note reviewed  Constitutional:       Appearance: She is well-developed  HENT:      Head: Normocephalic and atraumatic  Eyes:      General: No scleral icterus  Extraocular Movements: Extraocular movements intact  Conjunctiva/sclera: Conjunctivae normal       Pupils: Pupils are equal, round, and reactive to light  Neck:      Musculoskeletal: Normal range of motion and neck supple  Thyroid: No thyromegaly  Cardiovascular:      Rate and Rhythm: Normal rate and regular rhythm  Heart sounds: Normal heart sounds  No murmur  Pulmonary:      Effort: Pulmonary effort is normal       Breath sounds: Normal breath sounds  No wheezing or rales  Abdominal:      General: There is no distension  Palpations: There is no mass  Musculoskeletal:      Right lower leg: No edema  Left lower leg: No edema  Lymphadenopathy:      Cervical: No cervical adenopathy  Skin:     Coloration: Skin is not jaundiced  Findings: No erythema or rash  Neurological:      General: No focal deficit present  Mental Status: She is alert  Psychiatric:         Mood and Affect: Mood normal             Past Surgical History:   Procedure Laterality Date    BREAST BIOPSY Right 04/26/2012    COLONOSCOPY N/A 3/20/2019    Procedure: COLONOSCOPY;  Surgeon: Jono Alvarez MD;  [facility];   Service: Gastroenterology   [facility] TONSILLECTOMY         History reviewed  No pertinent family history        Current Outpatient Medications:     Diclofenac Sodium (VOLTAREN) 1 %, Apply 2 g topically 4 (four) times a day, Disp: 1 Tube, Rfl: 0    hydrochlorothiazide (HYDRODIURIL) 25 mg tablet, Take 1 tablet (25 mg total) by mouth daily, Disp: 90 tablet, Rfl: 3    levothyroxine 25 mcg tablet, Take 1 tablet (25 mcg total) by mouth daily, Disp: 90 tablet, Rfl: 3    losartan (COZAAR) 25 mg tablet, Take 1 tablet (25 mg total) by mouth daily, Disp: 90 tablet, Rfl: 3    simvastatin (ZOCOR) 40 mg tablet, Take 1 tablet (40 mg total) by mouth daily at bedtime, Disp: 90 tablet, Rfl: 3    carbamide peroxide (DEBROX) 6 5 % otic solution, Administer 5 drops into the left ear 2 (two) times a day, Disp: 15 mL, Rfl: 0    Allergies   Allergen Reactions    Nickel Rash       Vitals:    05/19/21 1018   BP: 118/68   BP Location: Right arm   Patient Position: Sitting   Cuff Size: Large   Pulse: 88   SpO2: 96%   Weight: 82 1 kg (181 lb)   Height: 5' 3" (1 6 m)

## 2021-05-19 NOTE — PATIENT INSTRUCTIONS
Medicare Preventive Visit Patient Instructions  Thank you for completing your Welcome to Medicare Visit or Medicare Annual Wellness Visit today  Your next wellness visit will be due in one year (5/20/2022)  The screening/preventive services that you may require over the next 5-10 years are detailed below  Some tests may not apply to you based off risk factors and/or age  Screening tests ordered at today's visit but not completed yet may show as past due  Also, please note that scanned in results may not display below  Preventive Screenings:  Service Recommendations Previous Testing/Comments   Colorectal Cancer Screening  * Colonoscopy    * Fecal Occult Blood Test (FOBT)/Fecal Immunochemical Test (FIT)  * Fecal DNA/Cologuard Test  * Flexible Sigmoidoscopy Age: 54-65 years old   Colonoscopy: every 10 years (may be performed more frequently if at higher risk)  OR  FOBT/FIT: every 1 year  OR  Cologuard: every 3 years  OR  Sigmoidoscopy: every 5 years  Screening may be recommended earlier than age 48 if at higher risk for colorectal cancer  Also, an individualized decision between you and your healthcare provider will decide whether screening between the ages of 74-80 would be appropriate  Colonoscopy: 03/20/2019  FOBT/FIT: Not on file  Cologuard: Not on file  Sigmoidoscopy: Not on file    Screening Current     Breast Cancer Screening Age: 36 years old  Frequency: every 1-2 years  Not required if history of left and right mastectomy Mammogram: 04/05/2019        Cervical Cancer Screening Between the ages of 21-29, pap smear recommended once every 3 years  Between the ages of 33-67, can perform pap smear with HPV co-testing every 5 years     Recommendations may differ for women with a history of total hysterectomy, cervical cancer, or abnormal pap smears in past  Pap Smear: Not on file    Screening Not Indicated   Hepatitis C Screening Once for adults born between 1945 and 1965  More frequently in patients at high risk for Hepatitis C Hep C Antibody: 12/04/2020    Screening Current   Diabetes Screening 1-2 times per year if you're at risk for diabetes or have pre-diabetes Fasting glucose: 89 mg/dL   A1C: No results in last 5 years    Screening Current   Cholesterol Screening Once every 5 years if you don't have a lipid disorder  May order more often based on risk factors  Lipid panel: 12/04/2020    Screening Not Indicated  History Lipid Disorder     Other Preventive Screenings Covered by Medicare:  1  Abdominal Aortic Aneurysm (AAA) Screening: covered once if your at risk  You're considered to be at risk if you have a family history of AAA  2  Lung Cancer Screening: covers low dose CT scan once per year if you meet all of the following conditions: (1) Age 50-69; (2) No signs or symptoms of lung cancer; (3) Current smoker or have quit smoking within the last 15 years; (4) You have a tobacco smoking history of at least 30 pack years (packs per day multiplied by number of years you smoked); (5) You get a written order from a healthcare provider  3  Glaucoma Screening: covered annually if you're considered high risk: (1) You have diabetes OR (2) Family history of glaucoma OR (3)  aged 48 and older OR (3)  American aged 72 and older  3  Osteoporosis Screening: covered every 2 years if you meet one of the following conditions: (1) You're estrogen deficient and at risk for osteoporosis based off medical history and other findings; (2) Have a vertebral abnormality; (3) On glucocorticoid therapy for more than 3 months; (4) Have primary hyperparathyroidism; (5) On osteoporosis medications and need to assess response to drug therapy  · Last bone density test (DXA Scan): 07/26/2016   5  HIV Screening: covered annually if you're between the age of 15-65  Also covered annually if you are younger than 13 and older than 72 with risk factors for HIV infection   For pregnant patients, it is covered up to 3 times per pregnancy  Immunizations:  Immunization Recommendations   Influenza Vaccine Annual influenza vaccination during flu season is recommended for all persons aged >= 6 months who do not have contraindications   Pneumococcal Vaccine (Prevnar and Pneumovax)  * Prevnar = PCV13  * Pneumovax = PPSV23   Adults 25-60 years old: 1-3 doses may be recommended based on certain risk factors  Adults 72 years old: Prevnar (PCV13) vaccine recommended followed by Pneumovax (PPSV23) vaccine  If already received PPSV23 since turning 65, then PCV13 recommended at least one year after PPSV23 dose  Hepatitis B Vaccine 3 dose series if at intermediate or high risk (ex: diabetes, end stage renal disease, liver disease)   Tetanus (Td) Vaccine - COST NOT COVERED BY MEDICARE PART B Following completion of primary series, a booster dose should be given every 10 years to maintain immunity against tetanus  Td may also be given as tetanus wound prophylaxis  Tdap Vaccine - COST NOT COVERED BY MEDICARE PART B Recommended at least once for all adults  For pregnant patients, recommended with each pregnancy  Shingles Vaccine (Shingrix) - COST NOT COVERED BY MEDICARE PART B  2 shot series recommended in those aged 48 and above     Health Maintenance Due:      Topic Date Due    MAMMOGRAM  04/05/2020    Colonoscopy Surveillance  03/20/2024    Colorectal Cancer Screening  03/20/2029    Hepatitis C Screening  Completed     Immunizations Due:  There are no preventive care reminders to display for this patient  Advance Directives   What are advance directives? Advance directives are legal documents that state your wishes and plans for medical care  These plans are made ahead of time in case you lose your ability to make decisions for yourself  Advance directives can apply to any medical decision, such as the treatments you want, and if you want to donate organs  What are the types of advance directives?   There are many types of advance directives, and each state has rules about how to use them  You may choose a combination of any of the following:  · Living will: This is a written record of the treatment you want  You can also choose which treatments you do not want, which to limit, and which to stop at a certain time  This includes surgery, medicine, IV fluid, and tube feedings  · Durable power of  for healthcare [facility]): This is a written record that states who you want to make healthcare choices for you when you are unable to make them for yourself  This person, called a proxy, is usually a family member or a friend  You may choose more than 1 proxy  · Do not resuscitate (DNR) order:  A DNR order is used in case your heart stops beating or you stop breathing  It is a request not to have certain forms of treatment, such as CPR  A DNR order may be included in other types of advance directives  · Medical directive: This covers the care that you want if you are in a coma, near death, or unable to make decisions for yourself  You can list the treatments you want for each condition  Treatment may include pain medicine, surgery, blood transfusions, dialysis, IV or tube feedings, and a ventilator (breathing machine)  · Values history: This document has questions about your views, beliefs, and how you feel and think about life  This information can help others choose the care that you would choose  Why are advance directives important? An advance directive helps you control your care  Although spoken wishes may be used, it is better to have your wishes written down  Spoken wishes can be misunderstood, or not followed  Treatments may be given even if you do not want them  An advance directive may make it easier for your family to make difficult choices about your care     Weight Management   Why it is important to manage your weight:  Being overweight increases your risk of health conditions such as heart disease, high blood pressure, type 2 diabetes, and certain types of cancer  It can also increase your risk for osteoarthritis, sleep apnea, and other respiratory problems  Aim for a slow, steady weight loss  Even a small amount of weight loss can lower your risk of health problems  How to lose weight safely:  A safe and healthy way to lose weight is to eat fewer calories and get regular exercise  You can lose up about 1 pound a week by decreasing the number of calories you eat by 500 calories each day  Healthy meal plan for weight management:  A healthy meal plan includes a variety of foods, contains fewer calories, and helps you stay healthy  A healthy meal plan includes the following:  · Eat whole-grain foods more often  A healthy meal plan should contain fiber  Fiber is the part of grains, fruits, and vegetables that is not broken down by your body  Whole-grain foods are healthy and provide extra fiber in your diet  Some examples of whole-grain foods are whole-wheat breads and pastas, oatmeal, brown rice, and bulgur  · Eat a variety of vegetables every day  Include dark, leafy greens such as spinach, kale, evelyn greens, and mustard greens  Eat yellow and orange vegetables such as carrots, sweet potatoes, and winter squash  · Eat a variety of fruits every day  Choose fresh or canned fruit (canned in its own juice or light syrup) instead of juice  Fruit juice has very little or no fiber  · Eat low-fat dairy foods  Drink fat-free (skim) milk or 1% milk  Eat fat-free yogurt and low-fat cottage cheese  Try low-fat cheeses such as mozzarella and other reduced-fat cheeses  · Choose meat and other protein foods that are low in fat  Choose beans or other legumes such as split peas or lentils  Choose fish, skinless poultry (chicken or turkey), or lean cuts of red meat (beef or pork)  Before you cook meat or poultry, cut off any visible fat  · Use less fat and oil  Try baking foods instead of frying them   Add less fat, such as margarine, sour cream, regular salad dressing and mayonnaise to foods  Eat fewer high-fat foods  Some examples of high-fat foods include french fries, doughnuts, ice cream, and cakes  · Eat fewer sweets  Limit foods and drinks that are high in sugar  This includes candy, cookies, regular soda, and sweetened drinks  Exercise:  Exercise at least 30 minutes per day on most days of the week  Some examples of exercise include walking, biking, dancing, and swimming  You can also fit in more physical activity by taking the stairs instead of the elevator or parking farther away from stores  Ask your healthcare provider about the best exercise plan for you  Narcotic (Opioid) Safety    Use narcotics safely:  · Take prescribed narcotics exactly as directed  · Do not give narcotics to others or take narcotics that belong to someone else  · Do not mix narcotics without medicines or alcohol  · Do not drive or operate heavy machinery after you take the narcotic  · Monitor for side effects and notify your healthcare provider if you experienced side effects such as nausea, sleepiness, itching, or trouble thinking clearly  Manage constipation:    Constipation is the most common side effect of narcotic medicine  Constipation is when you have hard, dry bowel movements, or you go longer than usual between bowel movements  Tell your healthcare provider about all changes in your bowel movements while you are taking narcotics  He or she may recommend laxative medicine to help you have a bowel movement  He or she may also change the kind of narcotic you are taking, or change when you take it  The following are more ways you can prevent or relieve constipation:    · Drink liquids as directed  You may need to drink extra liquids to help soften and move your bowels  Ask how much liquid to drink each day and which liquids are best for you  · Eat high-fiber foods    This may help decrease constipation by adding bulk to your bowel movements  High-fiber foods include fruits, vegetables, whole-grain breads and cereals, and beans  Your healthcare provider or dietitian can help you create a high-fiber meal plan  Your provider may also recommend a fiber supplement if you cannot get enough fiber from food  · Exercise regularly  Regular physical activity can help stimulate your intestines  Walking is a good exercise to prevent or relieve constipation  Ask which exercises are best for you  · Schedule a time each day to have a bowel movement  This may help train your body to have regular bowel movements  Bend forward while you are on the toilet to help move the bowel movement out  Sit on the toilet for at least 10 minutes, even if you do not have a bowel movement  Store narcotics safely:   · Store narcotics where others cannot easily get them  Keep them in a locked cabinet or secure area  Do not  keep them in a purse or other bag you carry with you  A person may be looking for something else and find the narcotics  · Make sure narcotics are stored out of the reach of children  A child can easily overdose on narcotics  Narcotics may look like candy to a small child  The best way to dispose of narcotics: The laws vary by country and area  In the [place], the best way is to return the narcotics through a take-back program  This program is offered by the [employer] ([employer])  The following are options for using the program:  · Take the narcotics to a LUCIA collection site  The site is often a law enforcement center  Call your local law enforcement center for scheduled take-back days in your area  You will be given information on where to go if the collection site is in a different location  · Take the narcotics to an approved pharmacy or hospital   A pharmacy or hospital may be set up as a collection site  You will need to ask if it is a LUCIA collection site if you were not directed there   A pharmacy or doctor's office may not be able to take back narcotics unless it is a LUCIA site  · Use a mail-back system  This means you are given containers to put the narcotics into  You will then mail them in the containers  · Use a take-back drop box  This is a place to leave the narcotics at any time  People and animals will not be able to get into the box  Your local law enforcement agency can tell you where to find a drop box in your area  Other ways to manage pain:   · Ask your healthcare provider about non-narcotic medicines to control pain  Nonprescription medicines include NSAIDs (such as ibuprofen) and acetaminophen  Prescription medicines include muscle relaxers, antidepressants, and steroids  · Pain may be managed without any medicines  Some ways to relieve pain include massage, aromatherapy, or meditation  Physical or occupational therapy may also help  For more information:   · Drug Enforcement Administration  [address] [place] [address]  [address] [address]  Phone: 9- 462 - 337-4703  Web Address: [facility]/drug_disposal/    · [address]  and Drug Administration  [facility] , [address] [place] [address]  Phone: 9- 388 - 535-6838  Web Address: http://[employer]/     © Copyright [employer] 2018 Information is for End User's use only and may not be sold, redistributed or otherwise used for commercial purposes   All illustrations and images included in CareNotes® are the copyrighted property of [employer]  or [address] [place] [address]

## 2021-05-19 NOTE — PROGRESS NOTES
Assessment and Plan:     Problem List Items Addressed This Visit        Endocrine    Hypothyroidism    Relevant Orders    TSH, 3rd generation       Cardiovascular and Mediastinum    Benign essential hypertension    Relevant Orders    CBC and differential    Comprehensive metabolic panel       Other    Hyperlipidemia    Relevant Orders    Lipid panel    Medicare annual wellness visit, subsequent      Other Visit Diagnoses     Encounter for screening mammogram for breast cancer    -  Primary    Relevant Orders    Mammo screening bilateral w 3d & cad    Menopause        Relevant Orders    DXA bone density spine hip and pelvis           Preventive health issues were discussed with patient, and age appropriate screening tests were ordered as noted in patient's After Visit Summary  Personalized health advice and appropriate referrals for health education or preventive services given if needed, as noted in patient's After Visit Summary  History of Present Illness:     Patient presents for Medicare Annual Wellness visit    Patient Care Team:  Lavern Scott MD as PCP - General  MD Susy Daniel MD as Endoscopist     Problem List:     Patient Active Problem List   Diagnosis    Benign essential hypertension    Excess or deficiency of vitamin D    Hyperlipidemia    Hypothyroidism    Screening for cardiovascular condition    Screen for colon cancer    Medicare annual wellness visit, subsequent      Past Medical and Surgical History:     Past Medical History:   Diagnosis Date    Disease of thyroid gland     Hyperlipidemia      Past Surgical History:   Procedure Laterality Date    BREAST BIOPSY Right 04/26/2012    COLONOSCOPY N/A 3/20/2019    Procedure: COLONOSCOPY;  Surgeon: Susy Avalos MD;  [facility]; Service: Gastroenterology    TONSILLECTOMY        Family History:     No family history on file     Social History:     E-Cigarette/Vaping    E-Cigarette Use Never User E-Cigarette/Vaping Substances    Nicotine No     THC No     CBD No     Flavoring No     Other No     Unknown No      Social History     Socioeconomic History    Marital status: /Civil Union     Spouse name: None    Number of children: None    Years of education: None    Highest education level: None   Occupational History    None   Social Needs    Financial resource strain: None    Food insecurity     Worry: None     Inability: None    Transportation needs     Medical: None     Non-medical: None   Tobacco Use    Smoking status: Never Smoker    Smokeless tobacco: Never Used   Substance and Sexual Activity    Alcohol use: Yes     Frequency: 4 or more times a week     Drinks per session: 1 or 2    Drug use: Never    Sexual activity: None   Lifestyle    Physical activity     Days per week: None     Minutes per session: None    Stress: None   Relationships    Social connections     Talks on phone: None     Gets together: None     Attends Yarsanism service: None     Active member of club or organization: None     Attends meetings of clubs or organizations: None     Relationship status: None    Intimate partner violence     Fear of current or ex partner: None     Emotionally abused: None     Physically abused: None     Forced sexual activity: None   Other Topics Concern    None   Social History Narrative    None      Medications and Allergies:     Current Outpatient Medications   Medication Sig Dispense Refill    Diclofenac Sodium (VOLTAREN) 1 % Apply 2 g topically 4 (four) times a day 1 Tube 0    hydrochlorothiazide (HYDRODIURIL) 25 mg tablet TAKE 1 TABLET BY MOUTH EVERY DAY 90 tablet 1    levothyroxine 25 mcg tablet TAKE 1 TABLET BY MOUTH EVERY DAY 90 tablet 1    losartan (COZAAR) 25 mg tablet TAKE 1 TABLET BY MOUTH EVERY DAY 90 tablet 1    simvastatin (ZOCOR) 40 mg tablet TAKE 1 TABLET BY MOUTH EVERYDAY AT BEDTIME 90 tablet 1    carbamide peroxide (DEBROX) 6 5 % otic solution Administer 5 drops into the left ear 2 (two) times a day 15 mL 0     No current facility-administered medications for this visit  Allergies   Allergen Reactions    Nickel Rash      Immunizations:     Immunization History   Administered Date(s) Administered    INFLUENZA 09/09/2015    Influenza Split High Dose Preservative Free IM 09/09/2015    SARS-CoV-2 / COVID-19 mRNA IM ([employer]) 03/09/2021, 03/29/2021      Health Maintenance:         Topic Date Due    MAMMOGRAM  04/05/2020    Colonoscopy Surveillance  03/20/2024    Colorectal Cancer Screening  03/20/2029    Hepatitis C Screening  Completed     There are no preventive care reminders to display for this patient  Medicare Health Risk Assessment:     /68 (BP Location: Right arm, Patient Position: Sitting, Cuff Size: Large)   Pulse 88   Ht 5' 3" (1 6 m)   Wt 82 1 kg (181 lb)   SpO2 96%   BMI 32 06 kg/m²      Nicole Gustafson is here for her Subsequent Wellness visit  Health Risk Assessment:   Patient rates overall health as good  Patient feels that their physical health rating is same  Patient is satisfied with their life  Eyesight was rated as same  Hearing was rated as same  Patient feels that their emotional and mental health rating is same  Patients states they are never, rarely angry  Patient states they are sometimes unusually tired/fatigued  Pain experienced in the last 7 days has been none  Patient states that she has experienced no weight loss or gain in last 6 months  Fall Risk Screening: In the past year, patient has experienced: no history of falling in past year      Urinary Incontinence Screening:   Patient has not leaked urine accidently in the last six months  Home Safety:  Patient does not have trouble with stairs inside or outside of their home  Patient has working smoke alarms and has working carbon monoxide detector  Home safety hazards include: none  Nutrition:   Current diet is Low Cholesterol  Medications:   Patient is not currently taking any over-the-counter supplements  Patient is able to manage medications  Activities of Daily Living (ADLs)/Instrumental Activities of Daily Living (IADLs):   Walk and transfer into and out of bed and chair?: Yes  Dress and groom yourself?: Yes    Bathe or shower yourself?: Yes    Feed yourself? Yes  Do your laundry/housekeeping?: Yes  Manage your money, pay your bills and track your expenses?: Yes  Make your own meals?: Yes    Do your own shopping?: Yes    Previous Hospitalizations:   Any hospitalizations or ED visits within the last 12 months?: No      Advance Care Planning:   Living will: No    Durable POA for healthcare: No    Advanced directive: No      Cognitive Screening:   Provider or family/friend/caregiver concerned regarding cognition?: No    PREVENTIVE SCREENINGS      Cardiovascular Screening:    General: Screening Not Indicated and History Lipid Disorder      Diabetes Screening:     General: Screening Current      Colorectal Cancer Screening:     General: Screening Current      Cervical Cancer Screening:    General: Screening Not Indicated      Osteoporosis Screening:    General: Risks and Benefits Discussed    Due for: Bone Density Ultrasound      Abdominal Aortic Aneurysm (AAA) Screening:        General: Screening Not Indicated      Lung Cancer Screening:     General: Screening Not Indicated      Hepatitis C Screening:    General: Screening Current    Screening, Brief Intervention, and Referral to Treatment (SBIRT)    Screening  Typical number of drinks in a day: 1  Typical number of drinks in a week: 7  Interpretation: Low risk drinking behavior      Single Item Drug Screening:  How often have you used an illegal drug (including marijuana) or a prescription medication for non-medical reasons in the past year? never    Single Item Drug Screen Score: 0  Interpretation: Negative screen for possible drug use disorder    Review of Current Opioid Use    Opioid Risk Tool (ORT) Interpretation: Complete Opioid Risk Tool (ORT)    Other Counseling Topics:   Regular weightbearing exercise and calcium and vitamin D intake         Maria Antonia Flood MD

## 2021-06-23 ENCOUNTER — HOSPITAL ENCOUNTER (OUTPATIENT)
Dept: RADIOLOGY | Facility: HOSPITAL | Age: 71
Discharge: HOME | End: 2021-06-23
Attending: FAMILY MEDICINE
Payer: MEDICARE

## 2021-06-23 ENCOUNTER — APPOINTMENT (OUTPATIENT)
Dept: RADIOLOGY | Facility: HOSPITAL | Age: 71
End: 2021-06-23
Attending: FAMILY MEDICINE
Payer: MEDICARE

## 2021-06-23 VITALS — BODY MASS INDEX: 32.43 KG/M2 | WEIGHT: 183 LBS | HEIGHT: 63 IN

## 2021-06-23 DIAGNOSIS — Z12.31 ENCOUNTER FOR SCREENING MAMMOGRAM FOR BREAST CANCER: ICD-10-CM

## 2021-06-23 DIAGNOSIS — Z78.0 MENOPAUSE: ICD-10-CM

## 2021-06-23 PROCEDURE — 77080 DXA BONE DENSITY AXIAL: CPT

## 2021-06-23 PROCEDURE — 77067 SCR MAMMO BI INCL CAD: CPT

## 2021-06-23 PROCEDURE — 77063 BREAST TOMOSYNTHESIS BI: CPT

## 2021-07-27 ENCOUNTER — EVALUATION (OUTPATIENT)
Dept: OCCUPATIONAL THERAPY | Facility: CLINIC | Age: 71
End: 2021-07-27
Payer: MEDICARE

## 2021-07-27 DIAGNOSIS — M77.02 MEDIAL EPICONDYLITIS OF ELBOW, LEFT: Primary | ICD-10-CM

## 2021-07-27 PROCEDURE — 97165 OT EVAL LOW COMPLEX 30 MIN: CPT

## 2021-07-27 NOTE — PROGRESS NOTES
OT Evaluation     Today's date: 2021  Patient name: Kristi Turcios  : 1950  MRN: [id number]  Referring provider: Gamal Guzman DO  Dx:   Encounter Diagnosis     ICD-10-CM    1  Medial epicondylitis of elbow, left  M77 02                   Assessment  Assessment details: Kristi Turcios is a 79 y o  female who presents with left medial epicondylitis, ongoing for 6+ months  Kristi Turcios presented to therapy with above listed impairments  Patient is limited by pain and reduced strength which impact participation in ADL/IADL  Kristi Turcios would benefit from skilled OPOT as recommended to address these deficits, return to best level of function, and maximize (I) in ADL/IADL       Impairments: activity intolerance, impaired physical strength, pain with function, weight-bearing intolerance and poor body mechanics    Symptom irritability: lowBarriers to therapy: None  Understanding of Dx/Px/POC: good   Prognosis: good    Goals  Patient will have 50% reduction in pain by 4-6 weeks  Patient will have 25% increase in  strength by 4-6 weeks  Patient will be (I) with stretching HEP in 4-6 weeks    LTG  Patient will perform ADL/IADL with less than 1/10 pain   Patient will be (I) with strengthening HEP   Patient will demonstrate  and pinch strength within 10# of that of contralateral side      Plan  Patient would benefit from: OT eval and skilled occupational therapy  Planned modality interventions: ultrasound, cryotherapy and thermotherapy: hydrocollator packs  Planned therapy interventions: activity modification, functional ROM exercises, flexibility, graded exercise, home exercise program, therapeutic exercise, therapeutic activities, stretching, strengthening, manual therapy, Gtz taping, neuromuscular re-education and patient education  Frequency: 2x week  Duration in visits: 10  Plan of Care beginning date: 2021  Plan of Care expiration date: 2021  Treatment plan discussed with: patient        Subjective Evaluation    History of Present Illness  Mechanism of injury: Onset 2021, no history of elbow pain prior to that time  Patient attributes this to shoveling heavily  Seen by ortho in 3/2021 at which time she was given a wrist brace worn during the day  The pain subsided for a while but has since returned  Recurrent probem    Pain  Current pain ratin  At best pain ratin  At worst pain ratin  Location: L elbow  Quality: sharp and dull ache  Relieving factors: change in position and support  Aggravating factors: lifting  Progression: worsening    Hand dominance: right    Patient Goals  Patient goals for therapy: decreased pain, increased strength and independence with ADLs/IADLs          Objective     Palpation   Left   Trigger point to pronator teres and wrist flexors  Tenderness     Left Elbow   Tenderness in the medial epicondyle  Left Wrist/Hand   Tenderness in the medial epicondyle  Additional Tenderness Details  3 cm distal to medial epicondyle     Neurological Testing     Sensation     Elbow   Left Elbow   Intact: light touch    Right Elbow   Intact: light touch    Active Range of Motion     Left Elbow   Normal active range of motion  Elbow hyperextension  Flexion: with pain  Forearm supination: with pain    Right Elbow   Normal active range of motion  Elbow hyperextension    Strength/Myotome Testing     Left Wrist/Hand   Wrist flexion: 5 (pain)     (2nd hand position)     Trial 1: 34    Trial 2: 28    Right Wrist/Hand      (2nd hand position)     Trial 1: 40    Trial 2: 45    Tests     Left Elbow   Positive elbow flexion                Precautions:   HEP: activity modification, ice massage stretch only    Manuals        IASTM        STM        Taping                Neuro Re-Ed         TGE        Eccentric Wrist         Eccentric Forearm        Isometric Forearm        Isometric Wrist                        Ther Ex FRANSISCOOM        PROM                                                        Ther Activity                                                Modalities        MHP        CP

## 2021-08-04 ENCOUNTER — OFFICE VISIT (OUTPATIENT)
Dept: OCCUPATIONAL THERAPY | Facility: CLINIC | Age: 71
End: 2021-08-04
Payer: MEDICARE

## 2021-08-04 DIAGNOSIS — M77.02 MEDIAL EPICONDYLITIS OF ELBOW, LEFT: Primary | ICD-10-CM

## 2021-08-04 PROCEDURE — 97110 THERAPEUTIC EXERCISES: CPT

## 2021-08-04 PROCEDURE — 97140 MANUAL THERAPY 1/> REGIONS: CPT

## 2021-08-06 ENCOUNTER — OFFICE VISIT (OUTPATIENT)
Dept: OCCUPATIONAL THERAPY | Facility: CLINIC | Age: 71
End: 2021-08-06
Payer: MEDICARE

## 2021-08-06 DIAGNOSIS — M77.02 MEDIAL EPICONDYLITIS OF ELBOW, LEFT: Primary | ICD-10-CM

## 2021-08-06 PROCEDURE — 97140 MANUAL THERAPY 1/> REGIONS: CPT

## 2021-08-06 PROCEDURE — 97110 THERAPEUTIC EXERCISES: CPT

## 2021-08-06 NOTE — PROGRESS NOTES
Daily Note     Today's date: 2021  Patient name: Massimo Meléndez  : 1950  MRN: [id number]  Referring provider: Izzy Burgess DO  Dx:   Encounter Diagnosis     ICD-10-CM    1  Medial epicondylitis of elbow, left  M77 02                   Subjective: Patient reports no big change  Rates pain 4/10 (but it is not all the time, different positions)  This morning she was weeding for an hour and she was sore after  Objective: See treatment diary below      Assessment: Tolerated treatment well  Patient demonstrated fatigue post treatment and would benefit from continued OT      Plan: Continue per plan of care        Precautions:   HEP: activity modification, ice massage stretch only    Manuals       IASTM 10' 10'      STM 5'       Taping Applied test patch to R f/a       Cupping  5'      KT  Test strip no issues  Applied to flexors      Compression D  Issued, pt reports comfort      Neuro Re-Ed                 Eccentric Wrist flx 2# 3x10 *      Eccentric Forearm  *Hold eccentrics until pain range <2/10 Hammer 2x10 *      Isometric Forearm        Isometric Wrist                        Ther Ex        AAROM        PROM  Flexors 8'       RPW 3x10       Wall walking TB up/down, side-side 5x each *                                      Ther Activity                                                Modalities        MHP 5' 5'      CP

## 2021-08-10 ENCOUNTER — OFFICE VISIT (OUTPATIENT)
Dept: OCCUPATIONAL THERAPY | Facility: CLINIC | Age: 71
End: 2021-08-10
Payer: MEDICARE

## 2021-08-10 DIAGNOSIS — M77.02 MEDIAL EPICONDYLITIS OF ELBOW, LEFT: Primary | ICD-10-CM

## 2021-08-10 PROCEDURE — 97112 NEUROMUSCULAR REEDUCATION: CPT | Performed by: OCCUPATIONAL THERAPIST

## 2021-08-10 PROCEDURE — 97140 MANUAL THERAPY 1/> REGIONS: CPT | Performed by: OCCUPATIONAL THERAPIST

## 2021-08-10 NOTE — PROGRESS NOTES
Daily Note     Today's date: 8/10/2021  Patient name: Beto Oconnor  : 1950  MRN: [id number]  Referring provider: Radha England DO  Dx:   Encounter Diagnosis     ICD-10-CM    1  Medial epicondylitis of elbow, left  M77 02                   Subjective: She reports that tubigrip caused swelling distal and proximal to where it ended  Objective: See treatment diary below  Assessment: Tolerated treatment well  Patient demonstrated fatigue post treatment and would benefit from continued OT  Continues with localized tenderness at the medial epicondyle  Trigger points and taught muscle bands into the medial tricep as well  Plan: Continue per plan of care        Precautions: Universal  HEP: activity modification, ice massage stretch only    Manuals 8/4 8/6 8/10     IASTM 10' 10' 10'     STM 5'       Taping Applied test patch to R f/a       Cupping  5' 5'     KT  Test strip no issues  Applied to flexors X     Compression D  Issued, pt reports comfort Issued size E     Neuro Re-Ed                 Eccentric Wrist flx 2# 3x10 * 2# 3x10 with self flexor stretching in between      Eccentric Forearm  *Hold eccentrics until pain range <2/10 Hammer 2x10 *      Isometric Forearm   With red flex bar 20x supination and pronation      Isometric Wrist   With red flex bar 20x flexion and extension                      Ther Ex        AAROM        PROM  Flexors 8'       RPW 3x10       Wall walking TB up/down, side-side 5x each *                                      Ther Activity                                                Modalities        MHP 5' 5' 5'     CP

## 2021-08-13 ENCOUNTER — OFFICE VISIT (OUTPATIENT)
Dept: OCCUPATIONAL THERAPY | Facility: CLINIC | Age: 71
End: 2021-08-13
Payer: MEDICARE

## 2021-08-13 DIAGNOSIS — M77.02 MEDIAL EPICONDYLITIS OF ELBOW, LEFT: Primary | ICD-10-CM

## 2021-08-13 PROCEDURE — 97110 THERAPEUTIC EXERCISES: CPT | Performed by: OCCUPATIONAL THERAPIST

## 2021-08-13 PROCEDURE — 97140 MANUAL THERAPY 1/> REGIONS: CPT | Performed by: OCCUPATIONAL THERAPIST

## 2021-08-13 NOTE — PROGRESS NOTES
Daily Note     Today's date: 2021  Patient name: Juany James  : 1950  MRN: [id number]  Referring provider: Rafa Viramontes DO  Dx:   Encounter Diagnosis     ICD-10-CM    1  Medial epicondylitis of elbow, left  M77 02                   Subjective: "it's sore", reports 3/10 pain in the elbow for two days after last therapy     Objective: See treatment diary below  Assessment:  Continues with localized tenderness at the medial epicondyle  Trigger points and taught muscle bands into the medial tricep as well  No complaints of paresthesias  Reduced resistive exercises today and focused on active motion of wrist and elbow  Plan: Continue per plan of care        Precautions: Universal  HEP: activity modification, ice massage stretch only    Manuals 8/4 8/6 8/10 8/13    IASTM 10' 10' 10' 10'    STM 5'       Taping Applied test patch to R f/a       Cupping  5' 5' 5'    KT  Test strip no issues  Applied to flexors X X    Compression D  Issued, pt reports comfort Issued size E     Neuro Re-Ed                 Eccentric Wrist flx 2# 3x10 * 2# 3x10 with self flexor stretching in between      Eccentric Forearm  *Hold eccentrics until pain range <2/10 Hammer 2x10 *      Isometric Forearm   With red flex bar 20x supination and pronation      Isometric Wrist   With red flex bar 20x flexion and extension                      Ther Ex        AAROM        PROM  Flexors 8'       RPW 3x10       Wall walking TB up/down, side-side 5x each *  TB up/down, side-side 5x each    Wrist maze    10x    keypegs    1x                    Ther Activity                                                Modalities        MHP 5' 5' 5' 5'    CP

## 2021-08-25 ENCOUNTER — OFFICE VISIT (OUTPATIENT)
Dept: OCCUPATIONAL THERAPY | Facility: CLINIC | Age: 71
End: 2021-08-25
Payer: MEDICARE

## 2021-08-25 DIAGNOSIS — M77.02 MEDIAL EPICONDYLITIS OF ELBOW, LEFT: Primary | ICD-10-CM

## 2021-08-25 PROCEDURE — 97140 MANUAL THERAPY 1/> REGIONS: CPT

## 2021-08-25 PROCEDURE — 97110 THERAPEUTIC EXERCISES: CPT

## 2021-08-25 PROCEDURE — 97112 NEUROMUSCULAR REEDUCATION: CPT

## 2021-08-25 NOTE — PROGRESS NOTES
Daily Note     Today's date: 2021  Patient name: Beto Oconnor  : 1950  MRN: [id number]  Referring provider: Radha England DO  Dx:   Encounter Diagnosis     ICD-10-CM    1  Medial epicondylitis of elbow, left  M77 02                   Subjective: It's feeling better  Has no pain right now but when it does it's a 2/10  Went on vacation so she kept last KT on for ~2 wks  Objective: See treatment diary below  Assessment:  Continues with tenderness at the medial epicondyle however it is noticeably reduced per patient  No complaints of paresthesias  Tolerated eccentrics today with intermittent stretching as needed without issue  Will reassess PRE next session  Plan: Continue per plan of care        Precautions: Universal  HEP: activity modification, ice massage stretch only    Manuals 8/4 8/6 8/10 8/13 8/25   IASTM 10' 10' 10' 10' 10   STM 5'       Taping Applied test patch to R f/a       Cupping  5' 5' 5' 5   KT  Test strip no issues  Applied to flexors X X x   Compression D  Issued, pt reports comfort Issued size E  Size E   Neuro Re-Ed                 Eccentric Wrist flx 2# 3x10 * 2# 3x10 with self flexor stretching in between   2# 3x10 with self flexor stretching in between , elbow extended   Eccentric Forearm  *Hold eccentrics until pain range <2/10 Hammer 2x10 *   x10   Isometric Forearm   With red flex bar 20x supination and pronation      Isometric Wrist   With red flex bar 20x flexion and extension                      Ther Ex        AAROM        PROM  Flexors 8'   10' flexors, then triceps    RPW 3x10    Y 1 5lbs, 2x15   Wall walking TB up/down, side-side 5x each *  TB up/down, side-side 5x each    Wrist maze    10x    keypegs    1x    Digit extensors      TB 2x10 pain free           Ther Activity                                                Modalities        MHP 5' 5' 5' 5' 5   CP

## 2021-08-27 ENCOUNTER — OFFICE VISIT (OUTPATIENT)
Dept: OCCUPATIONAL THERAPY | Facility: CLINIC | Age: 71
End: 2021-08-27
Payer: MEDICARE

## 2021-08-27 DIAGNOSIS — M77.02 MEDIAL EPICONDYLITIS OF ELBOW, LEFT: Primary | ICD-10-CM

## 2021-08-27 PROCEDURE — 97112 NEUROMUSCULAR REEDUCATION: CPT | Performed by: OCCUPATIONAL THERAPIST

## 2021-08-27 PROCEDURE — 97110 THERAPEUTIC EXERCISES: CPT | Performed by: OCCUPATIONAL THERAPIST

## 2021-08-27 PROCEDURE — 97140 MANUAL THERAPY 1/> REGIONS: CPT | Performed by: OCCUPATIONAL THERAPIST

## 2021-08-27 NOTE — PROGRESS NOTES
Daily Note     Today's date: 2021  Patient name: Claude Carpen  : 1950  MRN: [id number]  Referring provider: Jorge Gibson DO  Dx:   Encounter Diagnosis     ICD-10-CM    1  Medial epicondylitis of elbow, left  M77 02                   Subjective: She says she feels the most pain when she bends her elbow and brings her hands to her face  Objective: See treatment diary below  Assessment:  No pain during PREs  Tolerated session well with upgraded PREs  Plan: Continue per plan of care        Precautions: Universal  HEP: activity modification, ice massage stretch only    Manuals 8/27 8/6 8/10 8/13 8/25   IASTM 10' 10' 10' 10' 10   STM        Taping        Cupping 5' 5' 5' 5' 5   KT X Test strip no issues  Applied to flexors X X x   Compression D  Issued, pt reports comfort Issued size E  Size E   Neuro Re-Ed                 Eccentric Wrist flx 2# 3x10 with self flexor stretching in between  * 2# 3x10 with self flexor stretching in between   2# 3x10 with self flexor stretching in between , elbow extended   Eccentric Forearm  *Hold eccentrics until pain range <2/10  *   x10   Isometric Forearm With red flex bar 20x supination and pronation   With red flex bar 20x supination and pronation      Isometric Wrist With red flex bar 20x flexion and extension   With red flex bar 20x flexion and extension                      Ther Ex        AAROM        PROM  Flexors 8'   10' flexors, then triceps    GPW girpping with elbow flexed and slightly extended, 3x10    Y 1 5lbs, 2x15   Wall walking  *  TB up/down, side-side 5x each    Wrist maze    10x    keypegs    1x    Digit extensors      TB 2x10 pain free           Ther Activity                                                Modalities        MHP 5' 5' 5' 5' 5   CP

## 2021-09-01 ENCOUNTER — OFFICE VISIT (OUTPATIENT)
Dept: OCCUPATIONAL THERAPY | Facility: CLINIC | Age: 71
End: 2021-09-01
Payer: MEDICARE

## 2021-09-01 DIAGNOSIS — M77.02 MEDIAL EPICONDYLITIS OF ELBOW, LEFT: Primary | ICD-10-CM

## 2021-09-01 PROCEDURE — 97140 MANUAL THERAPY 1/> REGIONS: CPT

## 2021-09-01 PROCEDURE — 97110 THERAPEUTIC EXERCISES: CPT

## 2021-09-01 NOTE — PROGRESS NOTES
Daily Note     Today's date: 2021  Patient name: Julian Rosa  : 1950  MRN: [id number]  Referring provider: Freddie Gomez DO  Dx:   Encounter Diagnosis     ICD-10-CM    1  Medial epicondylitis of elbow, left  M77 02                   Subjective: It hurts a lot; I feel like I'm going backwards  Objective: See treatment diary below  Assessment:  Pt reports increased pain since 3 days ago  She denies doing any activity to flare up the pain  She was suad over the weekend, but mostly was using the R dominant hand  She had increased pain later that night  Focused on modalities, soft tissue massage, and cupping  Cont with KT and tubigrip as this helps control pain  Plan: Continue per plan of care        Precautions: Universal  HEP: activity modification, ice massage stretch only    Manuals 8/27 9/1 8/10 8/13 8/25   IASTM 10' 10' 10' 10' 10   STM        Taping        Cupping 5' 5' x 2 5' 5' 5   KT X X X X x   Compression D   Issued size E  Size E   Neuro Re-Ed                 Eccentric Wrist flx 2# 3x10 with self flexor stretching in between  HELD due to inreased pain today 2# 3x10 with self flexor stretching in between   2# 3x10 with self flexor stretching in between , elbow extended   Eccentric Forearm  *Hold eccentrics until pain range <2/10     x10   Isometric Forearm With red flex bar 20x supination and pronation   With red flex bar 20x supination and pronation      Isometric Wrist With red flex bar 20x flexion and extension   With red flex bar 20x flexion and extension                      Ther Ex        AAROM        PROM     10' flexors, then triceps    GPW girpping with elbow flexed and slightly extended, 3x10    Y 1 5lbs, 2x15   Wall walking    TB up/down, side-side 5x each    Wrist maze    10x    keypegs    1x    Digit extensors      TB 2x10 pain free           Ther Activity                                                Modalities        MHP 5' 5' 5' 5' 5   CP

## 2021-09-03 ENCOUNTER — OFFICE VISIT (OUTPATIENT)
Dept: OCCUPATIONAL THERAPY | Facility: CLINIC | Age: 71
End: 2021-09-03
Payer: MEDICARE

## 2021-09-03 DIAGNOSIS — M77.02 MEDIAL EPICONDYLITIS OF ELBOW, LEFT: Primary | ICD-10-CM

## 2021-09-03 PROCEDURE — 97140 MANUAL THERAPY 1/> REGIONS: CPT

## 2021-09-03 PROCEDURE — 97110 THERAPEUTIC EXERCISES: CPT

## 2021-09-03 PROCEDURE — 97112 NEUROMUSCULAR REEDUCATION: CPT

## 2021-09-03 NOTE — PROGRESS NOTES
Daily Note     Today's date: 9/3/2021  Patient name: Catherine Chaidez  : 1950  MRN: [id number]  Referring provider: Gerson Pena DO  Dx:   Encounter Diagnosis     ICD-10-CM    1  Medial epicondylitis of elbow, left  M77 02                   Subjective: "My arm feels better today then last visit "     Objective: See treatment diary below  Assessment:  Pt reports pain I L elbow is better compared to last visit  Pt noted disinterested in therapy today, and when therapist asking questions about pain and treatment  Therapist felt pt detatched performing exercises; going through the motions to finish  Pt did demo F+ AROM and stretching without c/o pain today  Therapist did not place KT 2* bruising and irritation to skin that was observed  Plan: Continue per plan of care        Precautions: Universal  HEP: activity modification, ice massage stretch only    Manuals 8/27 9/1 9/3 8/13 8/25   IASTM 10' 10' 10' 10' 10   STM   2'     Taping        Cupping 5' 5' x 2  5' 5   KT X X NV X x   Compression D   Size E  Size E   Neuro Re-Ed                 Eccentric Wrist flx 2# 3x10 with self flexor stretching in between  HELD due to inreased pain today 2# 3x10 with self flexor stretching in between   2# 3x10 with self flexor stretching in between , elbow extended   Eccentric Forearm  *Hold eccentrics until pain range <2/10     x10   Isometric Forearm With red flex bar 20x supination and pronation   With red flex bar 20x supination and pronation      Isometric Wrist With red flex bar 20x flexion and extension   With red flex bar 20x flexion and extension                      Ther Ex        AAROM        PROM     10' flexors, then triceps    GPW girpping with elbow flexed and slightly extended, 3x10  GPW girpping with elbow flexed and slightly extended, 3x10     Y 1 5lbs, 2x15   Wall walking    TB up/down, side-side 5x each    Wrist maze   10x 10x    keypegs   1x 1x    Digit extensors      TB 2x10 pain free Ther Activity                                                Modalities        [facility] 5' 5' 5' 5' 5   CP

## 2021-09-07 ENCOUNTER — OFFICE VISIT (OUTPATIENT)
Dept: OCCUPATIONAL THERAPY | Facility: CLINIC | Age: 71
End: 2021-09-07
Payer: MEDICARE

## 2021-09-07 DIAGNOSIS — M77.02 MEDIAL EPICONDYLITIS OF ELBOW, LEFT: Primary | ICD-10-CM

## 2021-09-07 PROCEDURE — 97140 MANUAL THERAPY 1/> REGIONS: CPT

## 2021-09-07 PROCEDURE — 97110 THERAPEUTIC EXERCISES: CPT

## 2021-09-07 NOTE — PROGRESS NOTES
Daily Note     Today's date: 2021  Patient name: Antonio Caruso  : 1950  MRN: [id number]  Referring provider: Mihai Jeffries DO  Dx:   Encounter Diagnosis     ICD-10-CM    1  Medial epicondylitis of elbow, left  M77 02                   Subjective: "It was hurting more yesterday  Objective: See treatment diary below  Assessment:  Pt denies any increased activity to increase her pain  Plan: Continue per plan of care        Precautions: Universal  HEP: activity modification, ice massage stretch only    Manuals 8/27 9/1 9/3 9/7 8/25   IASTM 10' 10' 10' 10' 10   STM   2' 5'    Cupping 5' 5' x 2  5' 5   KT X X NV X x   Compression D   Size E  Size E   Neuro Re-Ed                 Eccentric Wrist flx 2# 3x10 with self flexor stretching in between  HELD due to inreased pain today 2# 3x10 with self flexor stretching in between  2# 3x10 w/ self flexor stretching in between 2# 3x10 with self flexor stretching in between , elbow extended   Eccentric Forearm  *Hold eccentrics until pain range <2/10     x10   Isometric Forearm With red flex bar 20x supination and pronation   With red flex bar 20x supination and pronation  RFB 20x S/P    Isometric Wrist With red flex bar 20x flexion and extension   With red flex bar 20x flexion and extension  RFB 20 F/E                    Ther Ex        AAROM        PROM     10' flexors, then triceps    GPW girpping with elbow flexed and slightly extended, 3x10  GPW girpping with elbow flexed and slightly extended, 3x10    GPW gripping with elbow slightly ext 3x10 Y 1 5lbs, 2x15   Wall walking        Wrist maze   10x     keypegs   1x     Digit extensors      TB 2x10 pain free           Ther Activity                                                Modalities        [facility] 5' 5' 5' 5' 5   CP

## 2021-09-09 ENCOUNTER — OFFICE VISIT (OUTPATIENT)
Dept: OCCUPATIONAL THERAPY | Facility: CLINIC | Age: 71
End: 2021-09-09
Payer: MEDICARE

## 2021-09-09 DIAGNOSIS — M77.02 MEDIAL EPICONDYLITIS OF ELBOW, LEFT: Primary | ICD-10-CM

## 2021-09-09 PROCEDURE — 97110 THERAPEUTIC EXERCISES: CPT

## 2021-09-09 PROCEDURE — 97140 MANUAL THERAPY 1/> REGIONS: CPT

## 2021-09-09 NOTE — PROGRESS NOTES
Daily Note     Today's date: 2021  Patient name: Mimi Grayson  : 1950  MRN: [id number]  Referring provider: Demetris Lei DO  Dx:   Encounter Diagnosis     ICD-10-CM    1  Medial epicondylitis of elbow, left  M77 02                   Subjective: It's not too bad today  Objective: See treatment diary below  Assessment:  Swelling proximal to elbow is slightly reduced today, but still present, despite massage and wearing the tubigrip  Discussed not keeping elbow flexed to 90, which pt has been doing, as this could be contributing to the swelling  Tolerated increased resistance today  Plan: Continue per plan of care        Precautions: Universal  HEP: activity modification, ice massage stretch only    Manuals 8/27 9/1 9/3 9/7 9/9   IASTM 10' 10' 10' 10' 10   STM   2' 5' 5'   Cupping 5' 5' x 2  5' Held today, due to swelling   KT X X NV X Held due to skin irritation   Compression D   Size E     Neuro Re-Ed                 Eccentric Wrist flx 2# 3x10 with self flexor stretching in between  HELD due to inreased pain today 2# 3x10 with self flexor stretching in between  2# 3x10 w/ self flexor stretching in between 3# 3x10 with self flexor stretching in between    Eccentric Forearm  *Hold eccentrics until pain range <2/10        Isometric Forearm With red flex bar 20x supination and pronation   With red flex bar 20x supination and pronation  RFB 20x S/P GFB 20x Ssup    RFB pron 30x   Isometric Wrist With red flex bar 20x flexion and extension   With red flex bar 20x flexion and extension  RFB 20 F/E GFB F/E 20x                   Ther Ex        AAROM        PROM         GPW girpping with elbow flexed and slightly extended, 3x10  GPW gripping with elbow slightly ext 3x10 GPW gripping with elbow slightly ext 3x10 GPW gripping with elbow slightly ext 3x10   Wall walking        Wrist maze   10x     keypegs   1x     Digit extensors                 Ther Activity Modalities        [facility] 5' 5' 5' 5' 5   CP

## 2021-09-14 ENCOUNTER — EVALUATION (OUTPATIENT)
Dept: OCCUPATIONAL THERAPY | Facility: CLINIC | Age: 71
End: 2021-09-14
Payer: MEDICARE

## 2021-09-14 DIAGNOSIS — M77.02 MEDIAL EPICONDYLITIS OF ELBOW, LEFT: Primary | ICD-10-CM

## 2021-09-14 PROCEDURE — 97110 THERAPEUTIC EXERCISES: CPT

## 2021-09-14 PROCEDURE — 97112 NEUROMUSCULAR REEDUCATION: CPT

## 2021-09-14 NOTE — PROGRESS NOTES
OT Evaluation     Today's date: 2021  Patient name: Marty Go  : 1950  MRN: [id number]  Referring provider: Doirta Bob DO  Dx:   Encounter Diagnosis     ICD-10-CM    1  Medial epicondylitis of elbow, left  M77 02                   Assessment  Assessment details: Marty Go is a 79 y o  female who presents with left medial epicondylitis, ongoing for 8+ months  Marty Go presented to therapy with below listed impairments  Patient on IE demonstrates reduced but continued pain, increased strength but inadequate for her reported leisure activities of carrying boxes up and down flights of stairs  She is limited by swelling, pain and reduced strength which impact participation in ADL/IADL  Marty Go would benefit from continued OPOT as recommended to address these deficits, return to best level of function, and maximize (I) in ADL/IADL       Impairments: activity intolerance, impaired physical strength, pain with function and poor body mechanics    Symptom irritability: lowBarriers to therapy: None  Understanding of Dx/Px/POC: good   Prognosis: good    Goals  Patient will have 50% reduction in pain by 4-6 weeks MET  Patient will have 25% increase in  strength by 4-6 weeks MET  Patient will be (I) with stretching HEP in 4-6 weeks MET    New  1)  Patient will reduce swelling at elbow circ  to 23 cm within 4-6 weeks   2) Patient will be able to carry 10# across room with less than 1 break for arm within 4-6 weeks    LTG  Patient will perform ADL/IADL with less than 1/10 pain MET  Patient will be (I) with strengthening HEP   Patient will demonstrate  and pinch strength within 10# of that of contralateral side MET      Plan  Plan details: Initiate slow and progressive strengthening for home   Recommended today on re-eval that patient consider a f/u with orthopedics due to continued swelling at inner elbow  Patient would benefit from: OT eval and skilled occupational therapy  Planned modality interventions: ultrasound, cryotherapy and thermotherapy: hydrocollator packs  Planned therapy interventions: activity modification, functional ROM exercises, graded exercise, home exercise program, therapeutic exercise, therapeutic activities, stretching, strengthening, manual therapy, Gtz taping, neuromuscular re-education and patient education  Frequency: 1-2 x per week, to reduce at end of month  Duration in visits: 10  Plan of Care beginning date: 2021  Plan of Care expiration date: 2021  Treatment plan discussed with: patient and referring physician        Subjective Evaluation    History of Present Illness  Mechanism of injury: Onset 2021, no history of elbow pain prior to that time  Patient attributes this to shoveling heavily  Seen by ortho in 3/2021 at which time she was given a wrist brace worn during the day  The pain subsided for a while but has since returned  Today on re-evaluation, patient describes discomfort as soreness, not pain  This occurs primarily at nighttime when she extends her arm from the bent position  Recurrent probem    Pain  Current pain ratin  At best pain ratin  At worst pain ratin  Location: L elbow  Quality: sharp and dull ache  Relieving factors: change in position and support  Progression: improved    Hand dominance: right    Patient Goals  Patient goals for therapy: decreased pain, increased strength and independence with ADLs/IADLs  Patient goal: Example of resuming recreational activity is decorating for Halloween, 2 flights of steps with 10#        Objective     Tenderness     Left Elbow   Tenderness in the medial epicondyle  Left Wrist/Hand   Tenderness in the medial epicondyle       Additional Tenderness Details  Between medial epicondyle and olecranon    Neurological Testing     Sensation     Elbow   Left Elbow   Intact: light touch    Right Elbow   Intact: light touch    Active Range of Motion Left Elbow   Normal active range of motion  Elbow hyperextension    Right Elbow   Normal active range of motion  Elbow hyperextension    Strength/Myotome Testing     Left Wrist/Hand   Wrist flexion: 5 (pain)     (2nd hand position)     Trial 1: 38    Trial 2: 38    Trial 3: 38    Right Wrist/Hand      (2nd hand position)     Trial 1: 40    Trial 2: 45    Trial 3: 39    Additional Strength Details  No pain with grasp             Precautions:   HEP: activity modification, ice massage stretch only      Manuals 9/14 9/1 9/3 9/7 9/9   IASTM  10' 10' 10' 10   STM   2' 5' 5'   Cupping  5' x 2  5' Held today, due to swelling   KT  X NV X Held due to skin irritation   Compression D   Size E     Neuro Re-Ed                 Eccentric Wrist flx  HELD due to inreased pain today 2# 3x10 with self flexor stretching in between  2# 3x10 w/ self flexor stretching in between 3# 3x10 with self flexor stretching in between    Eccentric Forearm          Isometric Forearm GFB 20x Sup  With red flex bar 20x supination and pronation  RFB 20x S/P GFB 20x Ssup    RFB pron 30x   Isometric Wrist GFB F/E 20x  With red flex bar 20x flexion and extension  RFB 20 F/E GFB F/E 20x                   Ther Ex        AAROM        PROM           GPW gripping with elbow slightly ext 3x10 GPW gripping with elbow slightly ext 3x10 GPW gripping with elbow slightly ext 3x10   Wall walking        Wrist maze   10x     keypegs   1x     Digit extensors         RE 15'       Wrist strength 2# flex, 3# ext, hammer 10'       Ther Activity        Object transfer 10# 10# 15ft  5# across the room                                       Modalities        [facility] 5' 5' 5' 5' 5   CP

## 2021-09-17 ENCOUNTER — APPOINTMENT (OUTPATIENT)
Dept: OCCUPATIONAL THERAPY | Facility: CLINIC | Age: 71
End: 2021-09-17
Payer: MEDICARE

## 2021-09-20 ENCOUNTER — OFFICE VISIT (OUTPATIENT)
Dept: OCCUPATIONAL THERAPY | Facility: CLINIC | Age: 71
End: 2021-09-20
Payer: MEDICARE

## 2021-09-20 DIAGNOSIS — M77.02 MEDIAL EPICONDYLITIS OF ELBOW, LEFT: Primary | ICD-10-CM

## 2021-09-20 PROCEDURE — 97112 NEUROMUSCULAR REEDUCATION: CPT

## 2021-09-20 PROCEDURE — 97140 MANUAL THERAPY 1/> REGIONS: CPT

## 2021-09-20 NOTE — PROGRESS NOTES
Daily Note     Today's date: 2021  Patient name: Paul Gustafson  : 1950  MRN: [id number]  Referring provider: Lula Hawkins DO  Dx:   Encounter Diagnosis     ICD-10-CM    1  Medial epicondylitis of elbow, left  M77 02                   Subjective: That killed me for days (carrying the 10# wt)  Objective: See treatment diary below      Assessment: Tolerated treatment fair  Patient had difficulty with resistive s/p with flexbar today, demonstrated fatigue post treatment and would benefit from continued OT  She does not feel any difference since not using the KT this past week  Tubigrip is rolling when pt is wearing it, and questioning if this is contributing to her swelling proximal elbow  Pt will stop using the tubigrip until next session on Wed , to see if it is helping or not  Reviewed avoiding elbow flexion posture throughout the day  Plan: Continue per plan of care  Assess if tubigrip is helping reduce swelling, or causing increased swelling due to rolling       Precautions:   HEP: activity modification, ice massage stretch only      Manuals 9/14 9/20 9/3 9/7 9/9   IASTM  10' 10' 10' 10   STM  5' 2' 5' 5'   Cupping    5' Held today, due to swelling   KT   NV X Held due to skin irritation   Compression D   Size E     Neuro Re-Ed                 Eccentric Wrist flx   2# 3x10 with self flexor stretching in between  2# 3x10 w/ self flexor stretching in between 3# 3x10 with self flexor stretching in between    Eccentric Forearm          Isometric Forearm GFB 20x Sup RFB 20x (unable to do green today due to weakness) With red flex bar 20x supination and pronation  RFB 20x S/P GFB 20x Ssup    RFB pron 30x   Isometric Wrist GFB F/E 20x GFB F/E 20x With red flex bar 20x flexion and extension  RFB 20 F/E GFB F/E 20x                   Ther Ex        AAROM        PROM           GPW gripping with elbow slightly ext 3x10 GPW gripping with elbow slightly ext 3x10 GPW gripping with elbow slightly ext 3x10   Wall walking        Wrist maze   10x     keypegs   1x     Digit extensors         RE 15'       Wrist strength 2# flex, 3# ext, hammer 10' 3# ext  2# flex  Hammer  3x10 each      Ther Activity        Object transfer 10# 10# 15ft  5# across the room                                       Modalities        P 5' 5' 5' 5' 5   CP

## 2021-09-22 ENCOUNTER — OFFICE VISIT (OUTPATIENT)
Dept: OCCUPATIONAL THERAPY | Facility: CLINIC | Age: 71
End: 2021-09-22
Payer: MEDICARE

## 2021-09-22 ENCOUNTER — TELEPHONE (OUTPATIENT)
Dept: OBGYN CLINIC | Facility: HOSPITAL | Age: 71
End: 2021-09-22

## 2021-09-22 DIAGNOSIS — M77.02 MEDIAL EPICONDYLITIS OF ELBOW, LEFT: Primary | ICD-10-CM

## 2021-09-22 PROCEDURE — 97112 NEUROMUSCULAR REEDUCATION: CPT

## 2021-09-22 PROCEDURE — 97110 THERAPEUTIC EXERCISES: CPT

## 2021-09-22 PROCEDURE — 97140 MANUAL THERAPY 1/> REGIONS: CPT

## 2021-09-22 NOTE — TELEPHONE ENCOUNTER
Scheduled with Diana White in an opening for tomorrow  Patient denies redness or heat to touch  Describes mild improvement in the swelling  Stated OT told her to call us because she has been in OT for 2 months with minimal improvement  Will come in as scheduled

## 2021-09-22 NOTE — PROGRESS NOTES
Daily Note     Today's date: 2021  Patient name: Marty Go  : 1950  MRN: [id number]  Referring provider: Dorita Bob DO  Dx:   Encounter Diagnosis     ICD-10-CM    1  Medial epicondylitis of elbow, left  M77 02                   Subjective: Patient reports that the pain is more constant  Reports last night she couldn't lift pan to pour, had to ask for help  She thinks this could be partially from not wearing compression, but states she is not improving as she should be  Objective: See treatment diary below      Assessment: Tolerated treatment fair  She is limited by increased pain this visit  She was encouraged to continue to rest, to hold strengthening at home and continue with gentle massage and stretch only  She has been encouraged strongly to f/u with orthopedics  Patient demonstrated fatigue post treatment and would benefit from continued OT  Plan: Continue per plan of care      F/u with Dr Sina Alexandre       Precautions:   HEP: activity modification, ice massage stretch only      Manuals    IASTM  10' 10 10' 10   STM  5' 5 MEM 5' 5'   Cupping    5' Held today, due to swelling   KT   applied X Held due to skin irritation   Compression D        Neuro Re-Ed                 Eccentric Wrist flx   3# 3x10 self stretch between ea set 2# 3x10 w/ self flexor stretching in between 3# 3x10 with self flexor stretching in between    Eccentric Forearm     Hammer at head, 3x10 partial arc (75%)     Isometric Forearm GFB 20x Sup RFB 20x (unable to do green today due to weakness)  RFB 20x S/P GFB 20x Ssup    RFB pron 30x   Isometric Wrist GFB F/E 20x GFB F/E 20x  RFB 20 F/E GFB F/E 20x                   Ther Ex        AAROM        PROM   10         GPW gripping with elbow slightly ext 3x10 GPW gripping with elbow slightly ext 3x10   Wall walking        Wrist maze        keypegs        Digit extensors         RE 15'       Wrist strength 2# flex, 3# ext, hammer 10' 3# ext  2# flex  Hammer  3x10 each      Ther Activity        Object transfer 10# 10# 15ft  5# across the room                                       Modalities        [facility] 5' 5'  5' 5   CP

## 2021-09-22 NOTE — TELEPHONE ENCOUNTER
Patient has been doing PT and has had 0 improvement  They would like to follow up with you ASAP  I advised the first available is OCT 13th and [place]  Could we give her a call at [phone]

## 2021-09-23 ENCOUNTER — OFFICE VISIT (OUTPATIENT)
Dept: OBGYN CLINIC | Facility: CLINIC | Age: 71
End: 2021-09-23
Payer: MEDICARE

## 2021-09-23 VITALS
BODY MASS INDEX: 32.43 KG/M2 | HEART RATE: 73 BPM | SYSTOLIC BLOOD PRESSURE: 128 MMHG | DIASTOLIC BLOOD PRESSURE: 88 MMHG | HEIGHT: 63 IN | WEIGHT: 183 LBS

## 2021-09-23 DIAGNOSIS — M77.02 MEDIAL EPICONDYLITIS OF ELBOW, LEFT: Primary | ICD-10-CM

## 2021-09-23 DIAGNOSIS — G56.22 CUBITAL TUNNEL SYNDROME ON LEFT: ICD-10-CM

## 2021-09-23 PROCEDURE — 99213 OFFICE O/P EST LOW 20 MIN: CPT | Performed by: PHYSICIAN ASSISTANT

## 2021-09-23 NOTE — PROGRESS NOTES
Assessment/Plan:  1  Medial epicondylitis of elbow, left  MRI elbow left wo contrast   2  Cubital tunnel syndrome on left  EMG 1 Limb     Patient Active Problem List   Diagnosis    Essential hypertension    Excess or deficiency of vitamin D    Hyperlipidemia    Hypothyroidism    Screening for cardiovascular condition    Screen for colon cancer    Encounter for screening mammogram for breast cancer    Menopause    Medial epicondylitis of elbow, left    Cubital tunnel syndrome on left       Discussion/Summary:    70 y o  female  with persistent left elbow pain secondary to medial epicondylitis refractory after 6 months of occupational therapy  Left hand intermittent numbness and weakness suspicious for cubital tunnel syndrome     Will obtain MRI of the left elbow to further evaluate soft tissues in the medial flexor tendon origin and around the elbow in general   EMG of left upper extremity was ordered to evaluate for motor and sensory function of the ulnar nerve, patient does have noted weakness as well as numbness that is intermittent in the small and ring fingers   Advised patient to hold off on occupational therapy until we obtain MRI   Follow-up after MRI of left elbow discussed treatment options including possible medial epicondylar debridement if indicated   Briefly discussed a cortisone injection over the medial epicondyle patient declined this again at this time      Subjective:   Patient ID: Chet Ward is a 70 y o  female   HPI    Patient presents to the office for follow up of left elbow pain  Since the last visit, the patient reports no relief from pain  Still has pain medial aspect left elbow  Pain is worse with activities and direct pressure over the medial elbow  She notes some swelling in the anterior elbow antecubital fossa region     He has also recently noted intermittent numbness in the small and ring fingers  She also has noted decreased  strength    This has been going on for least a week as she has noticed  Patient denies any new injuries to the left elbow since the last visit  The following portions of the patient's history were reviewed and updated as appropriate: allergies, current medications, past family history, past social history, past surgical history and problem list     Social History     Socioeconomic History    Marital status: /Civil Union     Spouse name: Not on file    Number of children: Not on file    Years of education: Not on file    Highest education level: Not on file   Occupational History    Not on file   Tobacco Use    Smoking status: Never Smoker    Smokeless tobacco: Never Used   Vaping Use    Vaping Use: Never used   Substance and Sexual Activity    Alcohol use: Yes    Drug use: Never    Sexual activity: Not on file   Other Topics Concern    Not on file   Social History Narrative    Not on file     Social Determinants of Health     Financial Resource Strain:     Difficulty of Paying Living Expenses:    Food Insecurity:     Worried About Running Out of Food in the Last Year:     [address] in the Last Year:    Transportation Needs:     Lack of Transportation (Medical):      Lack of Transportation (Non-Medical):    Physical Activity:     Days of Exercise per Week:     Minutes of Exercise per Session:    Stress:     Feeling of Stress :    Social Connections:     Frequency of Communication with Friends and Family:     Frequency of Social Gatherings with Friends and Family:     Attends [employer] Services:     Active Member of Clubs or Organizations:     Attends Club or Organization Meetings:     Marital Status:    Intimate Partner Violence:     Fear of Current or Ex-Partner:     Emotionally Abused:     Physically Abused:     Sexually Abused:      Past Medical History:   Diagnosis Date    Disease of thyroid gland     Hyperlipidemia      Past Surgical History:   Procedure Laterality Date    BREAST BIOPSY Right 04/26/2012    COLONOSCOPY N/A 3/20/2019    Procedure: COLONOSCOPY;  Surgeon: Dc Payne MD;  [facility]: [facility]; Service: Gastroenterology    TONSILLECTOMY       Allergies   Allergen Reactions    Nickel Rash     Current Outpatient Medications on File Prior to Visit   Medication Sig Dispense Refill    carbamide peroxide (DEBROX) 6 5 % otic solution Administer 5 drops into the left ear 2 (two) times a day 15 mL 0    Diclofenac Sodium (VOLTAREN) 1 % Apply 2 g topically 4 (four) times a day 1 Tube 0    hydrochlorothiazide (HYDRODIURIL) 25 mg tablet Take 1 tablet (25 mg total) by mouth daily 90 tablet 3    levothyroxine 25 mcg tablet Take 1 tablet (25 mcg total) by mouth daily 90 tablet 3    losartan (COZAAR) 25 mg tablet Take 1 tablet (25 mg total) by mouth daily 90 tablet 3    simvastatin (ZOCOR) 40 mg tablet Take 1 tablet (40 mg total) by mouth daily at bedtime 90 tablet 3     No current facility-administered medications on file prior to visit  Review of Systems  See HPI    Objective:    Vitals:    09/23/21 0935   BP: 128/88   Pulse: 73       Physical Exam  Constitutional:       Appearance: She is well-developed  HENT:      Head: Normocephalic and atraumatic  Eyes:      General: No scleral icterus  Conjunctiva/sclera: Conjunctivae normal    Cardiovascular:      Comments: No discernible arrhthymias  Pulmonary:      Effort: Pulmonary effort is normal  No respiratory distress  Breath sounds: No stridor  Abdominal:      General: There is no distension  Palpations: Abdomen is soft  Musculoskeletal:      Cervical back: Neck supple  Skin:     General: Skin is warm and dry  Findings: No erythema  Neurological:      Mental Status: She is alert and oriented to person, place, and time  Psychiatric:         Behavior: Behavior normal          Left Elbow Exam     Tenderness   The patient is experiencing tenderness in the medial epicondyle       Range of Motion   The patient has normal left elbow ROM  Tests   Tinel's sign (cubital tunnel): negative    Other   Erythema: absent  Scars: absent  Sensation: normal  Pulse: present    Comments:  4+ out of 5 strength with finger abduction and flexion of the ring and small fingers  Distal biceps tendon is intact            I have personally reviewed pertinent films in PACS  Procedures  No Procedures performed today    Portions of the record may have been created with voice recognition software  Occasional wrong word or "sound a like" substitutions may have occurred due to the inherent limitations of voice recognition software  Read the chart carefully and recognize, using context, where substitutions have occurred

## 2021-09-27 ENCOUNTER — APPOINTMENT (OUTPATIENT)
Dept: OCCUPATIONAL THERAPY | Facility: CLINIC | Age: 71
End: 2021-09-27
Payer: MEDICARE

## 2021-09-29 ENCOUNTER — APPOINTMENT (OUTPATIENT)
Dept: OCCUPATIONAL THERAPY | Facility: CLINIC | Age: 71
End: 2021-09-29
Payer: MEDICARE

## 2021-09-30 ENCOUNTER — HOSPITAL ENCOUNTER (OUTPATIENT)
Dept: NEUROLOGY | Facility: HOSPITAL | Age: 71
Discharge: HOME | End: 2021-09-30
Payer: MEDICARE

## 2021-09-30 DIAGNOSIS — G56.22 CUBITAL TUNNEL SYNDROME ON LEFT: ICD-10-CM

## 2021-09-30 PROCEDURE — 95909 NRV CNDJ TST 5-6 STUDIES: CPT | Performed by: PSYCHIATRY & NEUROLOGY

## 2021-09-30 PROCEDURE — 95886 MUSC TEST DONE W/N TEST COMP: CPT | Performed by: PSYCHIATRY & NEUROLOGY

## 2021-10-21 NOTE — PROGRESS NOTES
Patient has not been seen since last ortho f/u at which time further testing/imaging was recommended  D/C OPOT until otherwise ordered by ortho     Yolanda Marrero, MS, OTR/L, CHT  Hand Therapist  Physical Therapy at [facility], [place]  Tel: [phone]  [facility]

## 2021-10-26 ENCOUNTER — OFFICE VISIT (OUTPATIENT)
Dept: FAMILY MEDICINE CLINIC | Facility: CLINIC | Age: 71
End: 2021-10-26
Payer: MEDICARE

## 2021-10-26 VITALS
DIASTOLIC BLOOD PRESSURE: 70 MMHG | SYSTOLIC BLOOD PRESSURE: 118 MMHG | HEART RATE: 79 BPM | RESPIRATION RATE: 16 BRPM | OXYGEN SATURATION: 99 % | HEIGHT: 63 IN | BODY MASS INDEX: 33.31 KG/M2 | TEMPERATURE: 97 F | WEIGHT: 188 LBS

## 2021-10-26 DIAGNOSIS — J06.9 VIRAL UPPER RESPIRATORY TRACT INFECTION: ICD-10-CM

## 2021-10-26 DIAGNOSIS — B34.9 VIRAL INFECTION, UNSPECIFIED: ICD-10-CM

## 2021-10-26 DIAGNOSIS — I10 ESSENTIAL HYPERTENSION: ICD-10-CM

## 2021-10-26 DIAGNOSIS — J40 BRONCHITIS: Primary | ICD-10-CM

## 2021-10-26 PROCEDURE — U0003 INFECTIOUS AGENT DETECTION BY NUCLEIC ACID (DNA OR RNA); SEVERE ACUTE RESPIRATORY SYNDROME CORONAVIRUS 2 (SARS-COV-2) (CORONAVIRUS DISEASE [COVID-19]), AMPLIFIED PROBE TECHNIQUE, MAKING USE OF HIGH THROUGHPUT TECHNOLOGIES AS DESCRIBED BY CMS-2020-01-R: HCPCS | Performed by: FAMILY MEDICINE

## 2021-10-26 PROCEDURE — 99214 OFFICE O/P EST MOD 30 MIN: CPT | Performed by: FAMILY MEDICINE

## 2021-10-26 PROCEDURE — U0005 INFEC AGEN DETEC AMPLI PROBE: HCPCS | Performed by: FAMILY MEDICINE

## 2021-10-26 RX ORDER — FLUTICASONE PROPIONATE 50 MCG
2 SPRAY, SUSPENSION (ML) NASAL DAILY
Qty: 16 G | Refills: 0 | Status: SHIPPED | OUTPATIENT
Start: 2021-10-26 | End: 2021-11-17

## 2021-10-26 RX ORDER — PREDNISONE 20 MG/1
40 TABLET ORAL DAILY
Qty: 10 TABLET | Refills: 0 | Status: SHIPPED | OUTPATIENT
Start: 2021-10-26 | End: 2021-10-31

## 2021-10-27 LAB — SARS-COV-2 RNA RESP QL NAA+PROBE: NEGATIVE

## 2021-10-30 ENCOUNTER — HOSPITAL ENCOUNTER (OUTPATIENT)
Dept: MRI IMAGING | Facility: HOSPITAL | Age: 71
Discharge: HOME | End: 2021-10-30
Payer: MEDICARE

## 2021-10-30 DIAGNOSIS — M77.02 MEDIAL EPICONDYLITIS OF ELBOW, LEFT: ICD-10-CM

## 2021-10-30 PROCEDURE — G1004 CDSM NDSC: HCPCS

## 2021-10-30 PROCEDURE — 73221 MRI JOINT UPR EXTREM W/O DYE: CPT

## 2021-11-03 ENCOUNTER — OFFICE VISIT (OUTPATIENT)
Dept: OBGYN CLINIC | Facility: CLINIC | Age: 71
End: 2021-11-03
Payer: MEDICARE

## 2021-11-03 VITALS
HEIGHT: 63 IN | DIASTOLIC BLOOD PRESSURE: 87 MMHG | WEIGHT: 188 LBS | BODY MASS INDEX: 33.31 KG/M2 | SYSTOLIC BLOOD PRESSURE: 126 MMHG | HEART RATE: 76 BPM

## 2021-11-03 DIAGNOSIS — M25.522 ELBOW PAIN, LEFT: Primary | ICD-10-CM

## 2021-11-03 PROCEDURE — 99213 OFFICE O/P EST LOW 20 MIN: CPT | Performed by: ORTHOPAEDIC SURGERY

## 2021-11-13 ENCOUNTER — APPOINTMENT (OUTPATIENT)
Dept: LAB | Facility: CLINIC | Age: 71
End: 2021-11-13
Payer: MEDICARE

## 2021-11-13 DIAGNOSIS — E78.5 HYPERLIPIDEMIA, UNSPECIFIED HYPERLIPIDEMIA TYPE: ICD-10-CM

## 2021-11-13 DIAGNOSIS — I10 BENIGN ESSENTIAL HYPERTENSION: ICD-10-CM

## 2021-11-13 DIAGNOSIS — E03.9 HYPOTHYROIDISM, UNSPECIFIED TYPE: ICD-10-CM

## 2021-11-13 LAB
ALBUMIN SERPL BCP-MCNC: 3.8 G/DL (ref 3.5–5)
ALP SERPL-CCNC: 46 U/L (ref 46–116)
ALT SERPL W P-5'-P-CCNC: 25 U/L (ref 12–78)
ANION GAP SERPL CALCULATED.3IONS-SCNC: 8 MMOL/L (ref 4–13)
AST SERPL W P-5'-P-CCNC: 13 U/L (ref 5–45)
BASOPHILS # BLD AUTO: 0.04 THOUSANDS/ΜL (ref 0–0.1)
BASOPHILS NFR BLD AUTO: 1 % (ref 0–1)
BILIRUB SERPL-MCNC: 0.68 MG/DL (ref 0.2–1)
BUN SERPL-MCNC: 17 MG/DL (ref 5–25)
CALCIUM SERPL-MCNC: 9.3 MG/DL (ref 8.3–10.1)
CHLORIDE SERPL-SCNC: 105 MMOL/L (ref 100–108)
CHOLEST SERPL-MCNC: 202 MG/DL (ref 50–200)
CO2 SERPL-SCNC: 28 MMOL/L (ref 21–32)
CREAT SERPL-MCNC: 0.82 MG/DL (ref 0.6–1.3)
EOSINOPHIL # BLD AUTO: 0.18 THOUSAND/ΜL (ref 0–0.61)
EOSINOPHIL NFR BLD AUTO: 4 % (ref 0–6)
ERYTHROCYTE [DISTWIDTH] IN BLOOD BY AUTOMATED COUNT: 12.5 % (ref 11.6–15.1)
GFR SERPL CREATININE-BSD FRML MDRD: 72 ML/MIN/1.73SQ M
GLUCOSE P FAST SERPL-MCNC: 89 MG/DL (ref 65–99)
HCT VFR BLD AUTO: 41.8 % (ref 34.8–46.1)
HDLC SERPL-MCNC: 71 MG/DL
HGB BLD-MCNC: 14.1 G/DL (ref 11.5–15.4)
IMM GRANULOCYTES # BLD AUTO: 0.01 THOUSAND/UL (ref 0–0.2)
IMM GRANULOCYTES NFR BLD AUTO: 0 % (ref 0–2)
LDLC SERPL CALC-MCNC: 112 MG/DL (ref 0–100)
LYMPHOCYTES # BLD AUTO: 1.52 THOUSANDS/ΜL (ref 0.6–4.47)
LYMPHOCYTES NFR BLD AUTO: 32 % (ref 14–44)
MCH RBC QN AUTO: 32.3 PG (ref 26.8–34.3)
MCHC RBC AUTO-ENTMCNC: 33.7 G/DL (ref 31.4–37.4)
MCV RBC AUTO: 96 FL (ref 82–98)
MONOCYTES # BLD AUTO: 0.52 THOUSAND/ΜL (ref 0.17–1.22)
MONOCYTES NFR BLD AUTO: 11 % (ref 4–12)
NEUTROPHILS # BLD AUTO: 2.42 THOUSANDS/ΜL (ref 1.85–7.62)
NEUTS SEG NFR BLD AUTO: 52 % (ref 43–75)
NONHDLC SERPL-MCNC: 131 MG/DL
NRBC BLD AUTO-RTO: 0 /100 WBCS
PLATELET # BLD AUTO: 242 THOUSANDS/UL (ref 149–390)
PMV BLD AUTO: 9 FL (ref 8.9–12.7)
POTASSIUM SERPL-SCNC: 3.8 MMOL/L (ref 3.5–5.3)
PROT SERPL-MCNC: 7 G/DL (ref 6.4–8.2)
RBC # BLD AUTO: 4.36 MILLION/UL (ref 3.81–5.12)
SODIUM SERPL-SCNC: 141 MMOL/L (ref 136–145)
TRIGL SERPL-MCNC: 93 MG/DL
TSH SERPL DL<=0.05 MIU/L-ACNC: 2.53 UIU/ML (ref 0.36–3.74)
WBC # BLD AUTO: 4.69 THOUSAND/UL (ref 4.31–10.16)

## 2021-11-13 PROCEDURE — 84443 ASSAY THYROID STIM HORMONE: CPT

## 2021-11-13 PROCEDURE — 85025 COMPLETE CBC W/AUTO DIFF WBC: CPT

## 2021-11-13 PROCEDURE — 36415 COLL VENOUS BLD VENIPUNCTURE: CPT

## 2021-11-13 PROCEDURE — 80061 LIPID PANEL: CPT

## 2021-11-13 PROCEDURE — 80053 COMPREHEN METABOLIC PANEL: CPT

## 2021-11-15 ENCOUNTER — OFFICE VISIT (OUTPATIENT)
Dept: FAMILY MEDICINE CLINIC | Facility: CLINIC | Age: 71
End: 2021-11-15
Payer: MEDICARE

## 2021-11-15 VITALS
WEIGHT: 188 LBS | DIASTOLIC BLOOD PRESSURE: 80 MMHG | OXYGEN SATURATION: 99 % | RESPIRATION RATE: 16 BRPM | BODY MASS INDEX: 33.31 KG/M2 | SYSTOLIC BLOOD PRESSURE: 118 MMHG | HEART RATE: 80 BPM | HEIGHT: 63 IN

## 2021-11-15 DIAGNOSIS — E78.5 HYPERLIPIDEMIA, UNSPECIFIED HYPERLIPIDEMIA TYPE: Primary | ICD-10-CM

## 2021-11-15 DIAGNOSIS — E03.9 HYPOTHYROIDISM, UNSPECIFIED TYPE: ICD-10-CM

## 2021-11-15 DIAGNOSIS — H61.21 IMPACTED CERUMEN OF RIGHT EAR: ICD-10-CM

## 2021-11-15 DIAGNOSIS — I10 ESSENTIAL HYPERTENSION: ICD-10-CM

## 2021-11-15 PROBLEM — J40 BRONCHITIS: Status: RESOLVED | Noted: 2019-09-30 | Resolved: 2021-11-15

## 2021-11-15 PROBLEM — B34.9 VIRAL INFECTION, UNSPECIFIED: Status: RESOLVED | Noted: 2021-10-26 | Resolved: 2021-11-15

## 2021-11-15 PROBLEM — J06.9 VIRAL UPPER RESPIRATORY TRACT INFECTION: Status: RESOLVED | Noted: 2017-06-19 | Resolved: 2021-11-15

## 2021-11-15 PROCEDURE — 99214 OFFICE O/P EST MOD 30 MIN: CPT | Performed by: FAMILY MEDICINE

## 2022-06-07 DIAGNOSIS — I10 ESSENTIAL HYPERTENSION: ICD-10-CM

## 2022-06-07 DIAGNOSIS — I10 BENIGN ESSENTIAL HYPERTENSION: ICD-10-CM

## 2022-06-08 ENCOUNTER — LAB (OUTPATIENT)
Dept: LAB | Facility: CLINIC | Age: 72
End: 2022-06-08
Payer: MEDICARE

## 2022-06-08 DIAGNOSIS — I10 ESSENTIAL HYPERTENSION: ICD-10-CM

## 2022-06-08 DIAGNOSIS — E03.9 HYPOTHYROIDISM, UNSPECIFIED TYPE: ICD-10-CM

## 2022-06-08 DIAGNOSIS — E78.5 HYPERLIPIDEMIA, UNSPECIFIED HYPERLIPIDEMIA TYPE: ICD-10-CM

## 2022-06-08 LAB
ALBUMIN SERPL BCP-MCNC: 4.4 G/DL (ref 3.5–5)
ALP SERPL-CCNC: 44 U/L (ref 34–104)
ALT SERPL W P-5'-P-CCNC: 14 U/L (ref 7–52)
ANION GAP SERPL CALCULATED.3IONS-SCNC: 8 MMOL/L (ref 4–13)
AST SERPL W P-5'-P-CCNC: 14 U/L (ref 13–39)
BILIRUB SERPL-MCNC: 0.71 MG/DL (ref 0.2–1)
BUN SERPL-MCNC: 18 MG/DL (ref 5–25)
CALCIUM SERPL-MCNC: 9.8 MG/DL (ref 8.4–10.2)
CHLORIDE SERPL-SCNC: 105 MMOL/L (ref 96–108)
CHOLEST SERPL-MCNC: 171 MG/DL
CO2 SERPL-SCNC: 28 MMOL/L (ref 21–32)
CREAT SERPL-MCNC: 0.87 MG/DL (ref 0.6–1.3)
GFR SERPL CREATININE-BSD FRML MDRD: 67 ML/MIN/1.73SQ M
GLUCOSE P FAST SERPL-MCNC: 89 MG/DL (ref 65–99)
HDLC SERPL-MCNC: 56 MG/DL
LDLC SERPL CALC-MCNC: 98 MG/DL (ref 0–100)
NONHDLC SERPL-MCNC: 115 MG/DL
POTASSIUM SERPL-SCNC: 4.1 MMOL/L (ref 3.5–5.3)
PROT SERPL-MCNC: 7 G/DL (ref 6.4–8.4)
SODIUM SERPL-SCNC: 141 MMOL/L (ref 135–147)
TRIGL SERPL-MCNC: 87 MG/DL
TSH SERPL DL<=0.05 MIU/L-ACNC: 3.19 UIU/ML (ref 0.45–4.5)

## 2022-06-08 PROCEDURE — 80053 COMPREHEN METABOLIC PANEL: CPT

## 2022-06-08 PROCEDURE — 84443 ASSAY THYROID STIM HORMONE: CPT

## 2022-06-08 PROCEDURE — 36415 COLL VENOUS BLD VENIPUNCTURE: CPT

## 2022-06-08 PROCEDURE — 80061 LIPID PANEL: CPT

## 2022-06-09 ENCOUNTER — OFFICE VISIT (OUTPATIENT)
Dept: FAMILY MEDICINE CLINIC | Facility: CLINIC | Age: 72
End: 2022-06-09
Payer: MEDICARE

## 2022-06-09 VITALS
HEIGHT: 64 IN | OXYGEN SATURATION: 98 % | SYSTOLIC BLOOD PRESSURE: 125 MMHG | WEIGHT: 187 LBS | DIASTOLIC BLOOD PRESSURE: 82 MMHG | BODY MASS INDEX: 31.92 KG/M2 | HEART RATE: 88 BPM

## 2022-06-09 DIAGNOSIS — E78.5 HYPERLIPIDEMIA, UNSPECIFIED HYPERLIPIDEMIA TYPE: ICD-10-CM

## 2022-06-09 DIAGNOSIS — I10 ESSENTIAL HYPERTENSION: ICD-10-CM

## 2022-06-09 DIAGNOSIS — E03.9 HYPOTHYROIDISM, UNSPECIFIED TYPE: ICD-10-CM

## 2022-06-09 DIAGNOSIS — Z12.31 ENCOUNTER FOR SCREENING MAMMOGRAM FOR BREAST CANCER: ICD-10-CM

## 2022-06-09 DIAGNOSIS — Z00.00 MEDICARE ANNUAL WELLNESS VISIT, SUBSEQUENT: Primary | ICD-10-CM

## 2022-06-09 PROCEDURE — 99214 OFFICE O/P EST MOD 30 MIN: CPT | Performed by: FAMILY MEDICINE

## 2022-06-09 PROCEDURE — G0439 PPPS, SUBSEQ VISIT: HCPCS | Performed by: FAMILY MEDICINE

## 2022-06-09 RX ORDER — SIMVASTATIN 40 MG
40 TABLET ORAL
Qty: 90 TABLET | Refills: 3 | Status: SHIPPED | OUTPATIENT
Start: 2022-06-09

## 2022-06-09 RX ORDER — LOSARTAN POTASSIUM 25 MG/1
TABLET ORAL
Qty: 90 TABLET | Refills: 3 | Status: SHIPPED | OUTPATIENT
Start: 2022-06-09

## 2022-06-09 RX ORDER — HYDROCHLOROTHIAZIDE 25 MG/1
TABLET ORAL
Qty: 90 TABLET | Refills: 3 | Status: SHIPPED | OUTPATIENT
Start: 2022-06-09

## 2022-06-09 NOTE — PROGRESS NOTES
Assessment and Plan:     Problem List Items Addressed This Visit        Endocrine    Hypothyroidism    Relevant Orders    TSH, 3rd generation       Cardiovascular and Mediastinum    Essential hypertension    Relevant Orders    CBC and differential    Comprehensive metabolic panel    Lipid panel       Other    Hyperlipidemia    Relevant Medications    simvastatin (ZOCOR) 40 mg tablet    Other Relevant Orders    CBC and differential    Comprehensive metabolic panel    Lipid panel    Medicare annual wellness visit, subsequent - Primary      Other Visit Diagnoses     Encounter for screening mammogram for breast cancer        Relevant Orders    Mammo screening bilateral w 3d & cad        BMI Counseling: Body mass index is 32 1 kg/m²  The BMI is above normal  Nutrition recommendations include decreasing portion sizes, encouraging healthy choices of fruits and vegetables, limiting drinks that contain sugar, moderation in carbohydrate intake and reducing intake of cholesterol  Exercise recommendations include exercising 3-5 times per week  Rationale for BMI follow-up plan is due to patient being overweight or obese  Preventive health issues were discussed with patient, and age appropriate screening tests were ordered as noted in patient's After Visit Summary  Personalized health advice and appropriate referrals for health education or preventive services given if needed, as noted in patient's After Visit Summary       History of Present Illness:     Patient presents for Medicare Annual Wellness visit    Patient Care Team:  Hamilton Smith MD as PCP - MD Jacqui Del Valle MD as Endoscopist     Problem List:     Patient Active Problem List   Diagnosis    Essential hypertension    Excess or deficiency of vitamin D    Hyperlipidemia    Hypothyroidism    Screening for cardiovascular condition    Screen for colon cancer    Medicare annual wellness visit, subsequent    Menopause    Medial epicondylitis of elbow, left    Cubital tunnel syndrome on left    Paresthesia    Impacted cerumen of right ear      Past Medical and Surgical History:     Past Medical History:   Diagnosis Date    Disease of thyroid gland     Hyperlipidemia      Past Surgical History:   Procedure Laterality Date    BREAST BIOPSY Right 04/26/2012    COLONOSCOPY N/A 3/20/2019    Procedure: COLONOSCOPY;  Surgeon: Maria Del Rosario Montesinos MD;  [facility]; Service: Gastroenterology    TONSILLECTOMY        Family History:     Family History   Problem Relation Age of Onset    No Known Problems Sister     No Known Problems Daughter     Bone cancer Maternal Grandmother     Brain cancer Maternal Aunt     No Known Problems Maternal Uncle     No Known Problems Daughter       Social History:     E-Cigarette/Vaping    E-Cigarette Use Never User      E-Cigarette/Vaping Substances    Nicotine No     THC No     CBD No     Flavoring No     Other No     Unknown No      Social History     Socioeconomic History    Marital status: /Civil Union     Spouse name: None    Number of children: None    Years of education: None    Highest education level: None   Occupational History    None   Tobacco Use    Smoking status: Never Smoker    Smokeless tobacco: Never Used   Vaping Use    Vaping Use: Never used   Substance and Sexual Activity    Alcohol use:  Yes    Drug use: Never    Sexual activity: None   Other Topics Concern    None   Social History Narrative    None     Social Determinants of Health     Financial Resource Strain: Not on file   Food Insecurity: Not on file   Transportation Needs: Not on file   Physical Activity: Not on file   Stress: Not on file   Social Connections: Not on file   Intimate Partner Violence: Not on file   Housing Stability: Not on file      Medications and Allergies:     Current Outpatient Medications   Medication Sig Dispense Refill    fluticasone (FLONASE) 50 mcg/act nasal spray SPRAY 2 SPRAYS INTO EACH NOSTRIL EVERY DAY 16 mL 0    levothyroxine 25 mcg tablet Take 1 tablet (25 mcg total) by mouth daily 90 tablet 3    simvastatin (ZOCOR) 40 mg tablet Take 1 tablet (40 mg total) by mouth daily at bedtime 90 tablet 3    hydrochlorothiazide (HYDRODIURIL) 25 mg tablet TAKE 1 TABLET BY MOUTH EVERY DAY 90 tablet 3    losartan (COZAAR) 25 mg tablet TAKE 1 TABLET BY MOUTH EVERY DAY 90 tablet 3     No current facility-administered medications for this visit  Allergies   Allergen Reactions    Nickel Rash      Immunizations:     Immunization History   Administered Date(s) Administered    COVID-19 PFIZER VACCINE 0 3 ML IM 03/09/2021, 03/29/2021    INFLUENZA 09/09/2015    Influenza Split High Dose Preservative Free IM 09/09/2015      Health Maintenance:         Topic Date Due    Breast Cancer Screening: Mammogram  06/23/2022    Colorectal Cancer Screening  03/20/2024    Hepatitis C Screening  Completed         Topic Date Due    Pneumococcal Vaccine: 65+ Years (1 - PCV) Never done    COVID-19 Vaccine (3 - Booster for Pfizer series) 08/29/2021    Influenza Vaccine (Season Ended) 09/01/2022      Medicare Health Risk Assessment:     /82   Pulse 88   Ht 5' 4" (1 626 m)   Wt 84 8 kg (187 lb)   SpO2 98%   BMI 32 10 kg/m²      Ron Barker is here for her Subsequent Wellness visit  Health Risk Assessment:   Patient rates overall health as good  Patient feels that their physical health rating is same  Patient is satisfied with their life  Eyesight was rated as same  Hearing was rated as same  Patient feels that their emotional and mental health rating is same  Patients states they are never, rarely angry  Patient states they are sometimes unusually tired/fatigued  Pain experienced in the last 7 days has been none  Patient states that she has experienced no weight loss or gain in last 6 months  Fall Risk Screening:    In the past year, patient has experienced: no history of falling in past year      Urinary Incontinence Screening:   Patient has not leaked urine accidently in the last six months  Home Safety:  Patient does not have trouble with stairs inside or outside of their home  Patient has working smoke alarms and has working carbon monoxide detector  Home safety hazards include: none  Nutrition:   Current diet is Low Cholesterol  Medications:   Patient is not currently taking any over-the-counter supplements  Patient is able to manage medications  Activities of Daily Living (ADLs)/Instrumental Activities of Daily Living (IADLs):   Walk and transfer into and out of bed and chair?: Yes  Dress and groom yourself?: Yes    Bathe or shower yourself?: Yes    Feed yourself?  Yes  Do your laundry/housekeeping?: Yes  Manage your money, pay your bills and track your expenses?: Yes  Make your own meals?: Yes    Do your own shopping?: Yes    Previous Hospitalizations:   Any hospitalizations or ED visits within the last 12 months?: No      Advance Care Planning:   Living will: No    Durable POA for healthcare: No    Advanced directive: No      Cognitive Screening:   Provider or family/friend/caregiver concerned regarding cognition?: No    PREVENTIVE SCREENINGS      Cardiovascular Screening:    General: Screening Not Indicated and History Lipid Disorder      Diabetes Screening:     General: Screening Current      Colorectal Cancer Screening:     General: Screening Current      Breast Cancer Screening:     General: Screening Current      Cervical Cancer Screening:    General: Screening Not Indicated      Osteoporosis Screening:    General: Risks and Benefits Discussed    Due for: Bone Density Ultrasound      Abdominal Aortic Aneurysm (AAA) Screening:        General: Screening Not Indicated      Lung Cancer Screening:     General: Screening Not Indicated      Hepatitis C Screening:    General: Screening Current    Screening, Brief Intervention, and Referral to Treatment (SBIRT)    Screening  Typical number of drinks in a day: 1  Typical number of drinks in a week: 14  Interpretation: Low risk drinking behavior  Single Item Drug Screening:  How often have you used an illegal drug (including marijuana) or a prescription medication for non-medical reasons in the past year? never    Single Item Drug Screen Score: 0  Interpretation: Negative screen for possible drug use disorder    Review of Current Opioid Use    Opioid Risk Tool (ORT) Interpretation: Complete Opioid Risk Tool (ORT)    Other Counseling Topics:   Regular weightbearing exercise and calcium and vitamin D intake         David Phipps MD

## 2022-06-09 NOTE — PROGRESS NOTES
Assessment/Plan:    Problem List Items Addressed This Visit        Endocrine    Hypothyroidism     Lab Results   Component Value Date    [id number] 3 187 06/08/2022   Continue same medication             Relevant Orders    TSH, 3rd generation       Cardiovascular and Mediastinum    Essential hypertension    Relevant Orders    CBC and differential    Comprehensive metabolic panel    Lipid panel       Other    Hyperlipidemia     Lipids  improving continue low-fat diet and continue medication           Relevant Medications    simvastatin (ZOCOR) 40 mg tablet    Other Relevant Orders    CBC and differential    Comprehensive metabolic panel    Lipid panel    Encounter for screening mammogram for breast cancer - Primary    Relevant Orders    Mammo screening bilateral w 3d & cad          No follow-ups on file  Chief Complaint   Patient presents with    Follow-up     Review labs    Medicare Wellness Visit     sub       Subjective:   Patient ID: Joon Green is a 70 y o  female  Follow-up on her labs, she says she is hitting all day, she takes all her medication and she is due for mammogram, she denies any new symptoms she lives with her spouse    HPI    Review of Systems   Constitutional: Negative for activity change, appetite change, chills, fatigue, fever and unexpected weight change  HENT: Negative for congestion, ear discharge, ear pain, nosebleeds, postnasal drip, rhinorrhea, sinus pressure, sneezing, sore throat, trouble swallowing and voice change  Eyes: Negative for photophobia, pain, discharge, redness and itching  Respiratory: Negative for cough, chest tightness, shortness of breath and wheezing  Cardiovascular: Negative for chest pain, palpitations and leg swelling  Gastrointestinal: Negative for abdominal pain, constipation, diarrhea, nausea and vomiting  Endocrine: Negative for polyuria  Genitourinary: Negative for dysuria, frequency and urgency     Musculoskeletal: Negative for arthralgias, back pain, myalgias and neck pain  Skin: Negative for color change, pallor and rash  Allergic/Immunologic: Negative for environmental allergies and food allergies  Neurological: Negative for dizziness, weakness, light-headedness and headaches  Hematological: Negative for adenopathy  Does not bruise/bleed easily  Psychiatric/Behavioral: Negative for behavioral problems  The patient is not nervous/anxious  Objective:  Physical Exam  Vitals and nursing note reviewed  Constitutional:       Appearance: Normal appearance  She is well-developed  She is not ill-appearing  HENT:      Head: Normocephalic and atraumatic  Right Ear: External ear normal       Left Ear: External ear normal    Eyes:      General: No scleral icterus  Extraocular Movements: Extraocular movements intact  Conjunctiva/sclera: Conjunctivae normal       Pupils: Pupils are equal, round, and reactive to light  Neck:      Thyroid: No thyromegaly  Cardiovascular:      Rate and Rhythm: Normal rate and regular rhythm  Heart sounds: Normal heart sounds  Pulmonary:      Effort: Pulmonary effort is normal       Breath sounds: Normal breath sounds  No wheezing or rales  Abdominal:      General: Abdomen is flat  There is no distension  Palpations: There is no mass  Musculoskeletal:      Cervical back: Normal range of motion and neck supple  Right lower leg: No edema  Left lower leg: No edema  Lymphadenopathy:      Cervical: No cervical adenopathy  Skin:     General: Skin is warm  Coloration: Skin is not jaundiced  Findings: No erythema or rash  Neurological:      General: No focal deficit present  Mental Status: She is alert and oriented to person, place, and time     Psychiatric:         Mood and Affect: Mood normal             Past Surgical History:   Procedure Laterality Date    BREAST BIOPSY Right 04/26/2012    COLONOSCOPY N/A 3/20/2019    Procedure: COLONOSCOPY;  Surgeon: Janiya Casarez MD;  [facility];   Service: Gastroenterology    TONSILLECTOMY         Family History   Problem Relation Age of Onset    No Known Problems Sister     No Known Problems Daughter     Bone cancer Maternal Grandmother     Brain cancer Maternal Aunt     No Known Problems Maternal Uncle     No Known Problems Daughter          Current Outpatient Medications:     fluticasone (FLONASE) 50 mcg/act nasal spray, SPRAY 2 SPRAYS INTO EACH NOSTRIL EVERY DAY, Disp: 16 mL, Rfl: 0    levothyroxine 25 mcg tablet, Take 1 tablet (25 mcg total) by mouth daily, Disp: 90 tablet, Rfl: 3    simvastatin (ZOCOR) 40 mg tablet, Take 1 tablet (40 mg total) by mouth daily at bedtime, Disp: 90 tablet, Rfl: 3    hydrochlorothiazide (HYDRODIURIL) 25 mg tablet, TAKE 1 TABLET BY MOUTH EVERY DAY, Disp: 90 tablet, Rfl: 3    losartan (COZAAR) 25 mg tablet, TAKE 1 TABLET BY MOUTH EVERY DAY, Disp: 90 tablet, Rfl: 3    Allergies   Allergen Reactions    Nickel Rash       Vitals:    06/09/22 1518   BP: 125/82   Pulse: 88   SpO2: 98%   Weight: 84 8 kg (187 lb)   Height: 5' 4" (1 626 m)

## 2022-08-15 NOTE — TELEPHONE ENCOUNTER
I called in the HCTZ again to the pharmacy  Patient does need to schedule a 2 week follow up  O-Z Flap Text: The defect edges were debeveled with a #15 scalpel blade.  Given the location of the defect, shape of the defect and the proximity to free margins an O-Z flap was deemed most appropriate.  Using a sterile surgical marker, an appropriate transposition flap was drawn incorporating the defect and placing the expected incisions within the relaxed skin tension lines where possible. The area thus outlined was incised deep to adipose tissue with a #15 scalpel blade.  The skin margins were undermined to an appropriate distance in all directions utilizing iris scissors.

## 2022-10-12 PROBLEM — H61.21 IMPACTED CERUMEN OF RIGHT EAR: Status: RESOLVED | Noted: 2021-11-15 | Resolved: 2022-10-12

## 2022-12-03 DIAGNOSIS — E03.9 HYPOTHYROIDISM, UNSPECIFIED TYPE: ICD-10-CM

## 2022-12-05 RX ORDER — LEVOTHYROXINE SODIUM 0.03 MG/1
TABLET ORAL
Qty: 90 TABLET | Refills: 1 | Status: SHIPPED | OUTPATIENT
Start: 2022-12-05

## 2022-12-12 ENCOUNTER — RA CDI HCC (OUTPATIENT)
Dept: OTHER | Facility: HOSPITAL | Age: 72
End: 2022-12-12

## 2022-12-12 NOTE — PROGRESS NOTES
"Facetimed sister with patient. He is still disoriented but awake/alert and spoke short phrase "that's my baby sister" then asked for food. This is change from beginning of shift when he appeared nonverbal and did not respond to painful stimuli and only followed certain commands. He was able to swallow clonidine and eat sandwich with ease. Patient is being reoriented often . Turning q2hr to prevent skin issues.   " [address]  coding opportunities       Chart reviewed, no opportunity found: CHART REVIEWED, NO OPPORTUNITY FOUND        Patients Insurance     Medicare Insurance: Medicare

## 2022-12-15 ENCOUNTER — OFFICE VISIT (OUTPATIENT)
Dept: FAMILY MEDICINE CLINIC | Facility: CLINIC | Age: 72
End: 2022-12-15

## 2022-12-15 VITALS
SYSTOLIC BLOOD PRESSURE: 120 MMHG | DIASTOLIC BLOOD PRESSURE: 70 MMHG | TEMPERATURE: 99.2 F | HEART RATE: 106 BPM | RESPIRATION RATE: 16 BRPM | OXYGEN SATURATION: 95 % | WEIGHT: 184 LBS | HEIGHT: 64 IN | BODY MASS INDEX: 31.41 KG/M2

## 2022-12-15 DIAGNOSIS — J06.9 VIRAL UPPER RESPIRATORY TRACT INFECTION: Primary | ICD-10-CM

## 2022-12-15 DIAGNOSIS — I10 ESSENTIAL HYPERTENSION: ICD-10-CM

## 2022-12-15 RX ORDER — OSELTAMIVIR PHOSPHATE 75 MG/1
75 CAPSULE ORAL 2 TIMES DAILY
Qty: 10 CAPSULE | Refills: 0 | Status: SHIPPED | OUTPATIENT
Start: 2022-12-15 | End: 2022-12-20

## 2022-12-15 NOTE — PROGRESS NOTES
Name: Stormy Ayala      : 1950      MRN: [id number]  Encounter Provider: Jamey Harding MD  Encounter Date: 12/15/2022   Encounter department: [facility]     1  Viral upper respiratory tract infection  Assessment & Plan:  She has fever chills fatigue nasal congestion coughing, for last 2 days today is the third day, she did not get the flu shot, we will start her on Tamiflu based on her symptom possible ,influenza    Orders:  -     oseltamivir (TAMIFLU) 75 mg capsule; Take 1 capsule (75 mg total) by mouth 2 (two) times a day for 5 days    2  Essential hypertension  Assessment & Plan:  Hypertension is stable, continue low-salt diet, continue same medications  Her pulse is high due to the fever, she will continue same medication advised to take Tylenol to reduce her temperature      Advised to reschedule her follow-up appointment and then she will come back to the labs done in the next few weeks    Depression Screening and Follow-up Plan: Patient was screened for depression during today's encounter  They screened negative with a PHQ-2 score of 0  Subjective     She has hypertension, hypothyroid, she did not go for the labs follow-up appointment was supposed to be next week after the labs    URI   This is a new problem  Episode onset: 3  The problem has been unchanged  The maximum temperature recorded prior to her arrival was 100 4 - 100 9 F  Associated symptoms include congestion, coughing, rhinorrhea and sinus pain  Pertinent negatives include no abdominal pain, chest pain, diarrhea, dysuria, ear pain, headaches, joint pain, joint swelling, nausea, neck pain, plugged ear sensation, rash, sneezing, sore throat, swollen glands, vomiting or wheezing  She has tried acetaminophen for the symptoms  The treatment provided no relief  Not vaccinated for the influenza, no sick contact  Review of Systems   HENT: Positive for congestion, rhinorrhea and sinus pain  Negative for ear pain, sneezing and sore throat  Eyes: Negative  Respiratory: Positive for cough  Negative for wheezing  Cardiovascular: Negative for chest pain  Gastrointestinal: Negative for abdominal pain, diarrhea, nausea and vomiting  Genitourinary: Negative for dysuria  Musculoskeletal: Negative for joint pain and neck pain  Skin: Negative for rash  Neurological: Negative for headaches  Psychiatric/Behavioral: Negative  Past Medical History:   Diagnosis Date   • Disease of thyroid gland    • Hyperlipidemia      Past Surgical History:   Procedure Laterality Date   • BREAST BIOPSY Right 04/26/2012   • COLONOSCOPY N/A 3/20/2019    Procedure: COLONOSCOPY;  Surgeon: Shu Yanez MD;  [facility]; Service: Gastroenterology   • TONSILLECTOMY       Family History   Problem Relation Age of Onset   • No Known Problems Sister    • No Known Problems Daughter    • Bone cancer Maternal Grandmother    • Brain cancer Maternal Aunt    • No Known Problems Maternal Uncle    • No Known Problems Daughter      Social History     Socioeconomic History   • Marital status: /Civil Union     Spouse name: None   • Number of children: None   • Years of education: None   • Highest education level: None   Occupational History   • None   Tobacco Use   • Smoking status: Never   • Smokeless tobacco: Never   Vaping Use   • Vaping Use: Never used   Substance and Sexual Activity   • Alcohol use:  Yes   • Drug use: Never   • Sexual activity: None   Other Topics Concern   • None   Social History Narrative   • None     Social Determinants of Health     Financial Resource Strain: Not on file   Food Insecurity: Not on file   Transportation Needs: Not on file   Physical Activity: Not on file   Stress: Not on file   Social Connections: Not on file   Intimate Partner Violence: Not on file   Housing Stability: Not on file     Current Outpatient Medications on File Prior to Visit   Medication Sig   • fluticasone (FLONASE) 50 mcg/act nasal spray SPRAY 2 SPRAYS INTO EACH NOSTRIL EVERY DAY   • hydrochlorothiazide (HYDRODIURIL) 25 mg tablet TAKE 1 TABLET BY MOUTH EVERY DAY   • levothyroxine 25 mcg tablet TAKE 1 TABLET BY MOUTH EVERY DAY   • losartan (COZAAR) 25 mg tablet TAKE 1 TABLET BY MOUTH EVERY DAY   • simvastatin (ZOCOR) 40 mg tablet Take 1 tablet (40 mg total) by mouth daily at bedtime     Allergies   Allergen Reactions   • Nickel Rash     Immunization History   Administered Date(s) Administered   • COVID-19 PFIZER VACCINE 0 3 ML IM 03/09/2021, 03/29/2021   • INFLUENZA 09/09/2015   • Influenza Split High Dose Preservative Free IM 09/09/2015       Objective     /70   Pulse (!) 106   Temp 99 2 °F (37 3 °C)   Resp 16   Ht 5' 4" (1 626 m)   Wt 83 5 kg (184 lb)   SpO2 95%   BMI 31 58 kg/m²     Physical Exam  Vitals and nursing note reviewed  Constitutional:       Appearance: She is well-developed  She is ill-appearing  HENT:      Head: Normocephalic and atraumatic  Eyes:      General: No scleral icterus  Extraocular Movements: Extraocular movements intact  Conjunctiva/sclera: Conjunctivae normal    Neck:      Thyroid: No thyromegaly  Cardiovascular:      Rate and Rhythm: Normal rate and regular rhythm  Heart sounds: Normal heart sounds  No murmur heard  Pulmonary:      Effort: Pulmonary effort is normal       Breath sounds: Normal breath sounds  No wheezing or rales  Musculoskeletal:      Cervical back: Normal range of motion and neck supple  Lymphadenopathy:      Cervical: No cervical adenopathy  Skin:     Findings: No erythema or rash  Neurological:      Mental Status: She is alert         Kathleen Coe MD

## 2022-12-15 NOTE — ASSESSMENT & PLAN NOTE
Hypertension is stable, continue low-salt diet, continue same medications    Her pulse is high due to the fever, she will continue same medication advised to take Tylenol to reduce her temperature

## 2022-12-15 NOTE — ASSESSMENT & PLAN NOTE
She has fever chills fatigue nasal congestion coughing, for last 2 days today is the third day, she did not get the flu shot, we will start her on Tamiflu based on her symptom possible ,influenza

## 2023-01-09 ENCOUNTER — APPOINTMENT (OUTPATIENT)
Dept: LAB | Facility: CLINIC | Age: 73
End: 2023-01-09

## 2023-01-09 DIAGNOSIS — E03.9 HYPOTHYROIDISM, UNSPECIFIED TYPE: ICD-10-CM

## 2023-01-09 DIAGNOSIS — E78.5 HYPERLIPIDEMIA, UNSPECIFIED HYPERLIPIDEMIA TYPE: ICD-10-CM

## 2023-01-09 DIAGNOSIS — I10 ESSENTIAL HYPERTENSION: ICD-10-CM

## 2023-01-09 LAB
ALBUMIN SERPL BCP-MCNC: 4.2 G/DL (ref 3.5–5)
ALP SERPL-CCNC: 40 U/L (ref 34–104)
ALT SERPL W P-5'-P-CCNC: 13 U/L (ref 7–52)
ANION GAP SERPL CALCULATED.3IONS-SCNC: 7 MMOL/L (ref 4–13)
AST SERPL W P-5'-P-CCNC: 14 U/L (ref 13–39)
BASOPHILS # BLD AUTO: 0.04 THOUSANDS/ÂΜL (ref 0–0.1)
BASOPHILS NFR BLD AUTO: 1 % (ref 0–1)
BILIRUB SERPL-MCNC: 0.73 MG/DL (ref 0.2–1)
BUN SERPL-MCNC: 16 MG/DL (ref 5–25)
CALCIUM SERPL-MCNC: 9.4 MG/DL (ref 8.4–10.2)
CHLORIDE SERPL-SCNC: 106 MMOL/L (ref 96–108)
CHOLEST SERPL-MCNC: 200 MG/DL
CO2 SERPL-SCNC: 29 MMOL/L (ref 21–32)
CREAT SERPL-MCNC: 0.73 MG/DL (ref 0.6–1.3)
EOSINOPHIL # BLD AUTO: 0.18 THOUSAND/ÂΜL (ref 0–0.61)
EOSINOPHIL NFR BLD AUTO: 4 % (ref 0–6)
ERYTHROCYTE [DISTWIDTH] IN BLOOD BY AUTOMATED COUNT: 12.9 % (ref 11.6–15.1)
GFR SERPL CREATININE-BSD FRML MDRD: 82 ML/MIN/1.73SQ M
GLUCOSE P FAST SERPL-MCNC: 86 MG/DL (ref 65–99)
HCT VFR BLD AUTO: 42.6 % (ref 34.8–46.1)
HDLC SERPL-MCNC: 64 MG/DL
HGB BLD-MCNC: 14.4 G/DL (ref 11.5–15.4)
IMM GRANULOCYTES # BLD AUTO: 0.01 THOUSAND/UL (ref 0–0.2)
IMM GRANULOCYTES NFR BLD AUTO: 0 % (ref 0–2)
LDLC SERPL CALC-MCNC: 115 MG/DL (ref 0–100)
LYMPHOCYTES # BLD AUTO: 1.54 THOUSANDS/ÂΜL (ref 0.6–4.47)
LYMPHOCYTES NFR BLD AUTO: 35 % (ref 14–44)
MCH RBC QN AUTO: 32.6 PG (ref 26.8–34.3)
MCHC RBC AUTO-ENTMCNC: 33.8 G/DL (ref 31.4–37.4)
MCV RBC AUTO: 96 FL (ref 82–98)
MONOCYTES # BLD AUTO: 0.56 THOUSAND/ÂΜL (ref 0.17–1.22)
MONOCYTES NFR BLD AUTO: 13 % (ref 4–12)
NEUTROPHILS # BLD AUTO: 2.12 THOUSANDS/ÂΜL (ref 1.85–7.62)
NEUTS SEG NFR BLD AUTO: 47 % (ref 43–75)
NONHDLC SERPL-MCNC: 136 MG/DL
NRBC BLD AUTO-RTO: 0 /100 WBCS
PLATELET # BLD AUTO: 226 THOUSANDS/UL (ref 149–390)
PMV BLD AUTO: 8.9 FL (ref 8.9–12.7)
POTASSIUM SERPL-SCNC: 4.3 MMOL/L (ref 3.5–5.3)
PROT SERPL-MCNC: 6.4 G/DL (ref 6.4–8.4)
RBC # BLD AUTO: 4.42 MILLION/UL (ref 3.81–5.12)
SODIUM SERPL-SCNC: 142 MMOL/L (ref 135–147)
TRIGL SERPL-MCNC: 107 MG/DL
TSH SERPL DL<=0.05 MIU/L-ACNC: 2.96 UIU/ML (ref 0.45–4.5)
WBC # BLD AUTO: 4.45 THOUSAND/UL (ref 4.31–10.16)

## 2023-01-10 ENCOUNTER — OFFICE VISIT (OUTPATIENT)
Dept: FAMILY MEDICINE CLINIC | Facility: CLINIC | Age: 73
End: 2023-01-10

## 2023-01-10 VITALS
OXYGEN SATURATION: 99 % | SYSTOLIC BLOOD PRESSURE: 120 MMHG | HEART RATE: 81 BPM | HEIGHT: 64 IN | DIASTOLIC BLOOD PRESSURE: 70 MMHG | RESPIRATION RATE: 16 BRPM | WEIGHT: 186 LBS | BODY MASS INDEX: 31.76 KG/M2

## 2023-01-10 DIAGNOSIS — E03.9 ACQUIRED HYPOTHYROIDISM: ICD-10-CM

## 2023-01-10 DIAGNOSIS — I10 ESSENTIAL HYPERTENSION: ICD-10-CM

## 2023-01-10 DIAGNOSIS — R05.9 COUGH, UNSPECIFIED TYPE: ICD-10-CM

## 2023-01-10 DIAGNOSIS — E78.5 HYPERLIPIDEMIA, UNSPECIFIED HYPERLIPIDEMIA TYPE: Primary | ICD-10-CM

## 2023-01-10 RX ORDER — PREDNISONE 20 MG/1
40 TABLET ORAL DAILY
Qty: 10 TABLET | Refills: 0 | Status: SHIPPED | OUTPATIENT
Start: 2023-01-10 | End: 2023-01-15

## 2023-01-10 NOTE — ASSESSMENT & PLAN NOTE
Lab Results   Component Value Date    LDLCALC 115 (H) 01/09/2023   Cholesterol has gone up slightly, she will work on improving her diet and advise low-fat diet and continue simvastatin

## 2023-01-10 NOTE — PROGRESS NOTES
Name: Abhinav Mascorro      : 1950      MRN: [id number]  Encounter Provider: Rosa Maria Mccollum MD  Encounter Date: 1/10/2023   Encounter department: [facility]     1  Hyperlipidemia, unspecified hyperlipidemia type  Assessment & Plan:  Lab Results   Component Value Date     [address] (H) 2023   Cholesterol has gone up slightly, she will work on improving her diet and advise low-fat diet and continue simvastatin      Orders:  -     CBC and differential; Future; Expected date: 07/10/2023  -     Comprehensive metabolic panel; Future; Expected date: 07/10/2023  -     Lipid panel; Future; Expected date: 07/10/2023  -     TSH, 3rd generation; Future; Expected date: 07/10/2023    2  Essential hypertension  Assessment & Plan:  Hypertension is stable, continue low-salt diet, continue same medications  Orders:  -     CBC and differential; Future; Expected date: 07/10/2023  -     Comprehensive metabolic panel; Future; Expected date: 07/10/2023  -     Lipid panel; Future; Expected date: 07/10/2023  -     TSH, 3rd generation; Future; Expected date: 07/10/2023    3  Acquired hypothyroidism  Assessment & Plan:  TSH is normal, continue same dose of levothyroxine recheck labs in 6      4  Cough, unspecified type  Assessment & Plan:  She was treated for URI in last month and she still has ongoing cough, with some clear mucus, no fever  She is advised to use over-the-counter Claritin 1 tablet daily and use prednisone for 5 days to reduce the congestion, her WBC count is normal    Orders:  -     predniSONE 20 mg tablet; Take 2 tablets (40 mg total) by mouth daily for 5 days      BMI Counseling: Body mass index is 31 93 kg/m²  The BMI is above normal  Nutrition recommendations include decreasing portion sizes, decreasing fast food intake, limiting drinks that contain sugar and reducing intake of cholesterol  Exercise recommendations include exercising 3-5 times per week  Rationale for BMI follow-up plan is due to patient being overweight or obese  Depression Screening and Follow-up Plan: Patient was screened for depression during today's encounter  They screened negative with a PHQ-2 score of 0  Subjective     She is here for follow-up on her labs,her cholesterol has gone up,  she is hypothyroid, hypertension and hyperlipidemia, she is taking all of it for URI possible influenza last month and took the Tamiflu she says the cough continues with clear mucus, and denies any difficulty breathing and taking cough medication and no improvement, her appetite is normal    Review of Systems   Constitutional: Negative for activity change, appetite change, chills, fatigue, fever and unexpected weight change  HENT: Negative for congestion, ear discharge, ear pain, nosebleeds, postnasal drip, rhinorrhea, sinus pressure, sneezing, sore throat, trouble swallowing and voice change  Eyes: Negative for photophobia, pain, discharge, redness and itching  Respiratory: Negative for cough, chest tightness, shortness of breath and wheezing  Cardiovascular: Negative for chest pain, palpitations and leg swelling  Gastrointestinal: Negative for abdominal pain, constipation, diarrhea, nausea and vomiting  Endocrine: Negative for polyuria  Genitourinary: Negative for dysuria, frequency and urgency  Musculoskeletal: Negative for arthralgias, back pain, myalgias and neck pain  Skin: Negative for color change, pallor and rash  Allergic/Immunologic: Negative for environmental allergies and food allergies  Neurological: Negative for dizziness, weakness, light-headedness and headaches  Hematological: Negative for adenopathy  Does not bruise/bleed easily  Psychiatric/Behavioral: Negative for behavioral problems  The patient is not nervous/anxious          Past Medical History:   Diagnosis Date   • Disease of thyroid gland    • Hyperlipidemia      Past Surgical History:   Procedure Laterality Date   • BREAST BIOPSY Right 04/26/2012   • COLONOSCOPY N/A 3/20/2019    Procedure: COLONOSCOPY;  Surgeon: Ludy Nolasco MD;  Location: [facility]; Service: Gastroenterology   • TONSILLECTOMY       Family History   Problem Relation Age of Onset   • No Known Problems Sister    • No Known Problems Daughter    • Bone cancer Maternal Grandmother    • Brain cancer Maternal Aunt    • No Known Problems Maternal Uncle    • No Known Problems Daughter      Social History     Socioeconomic History   • Marital status: /Civil Union     Spouse name: None   • Number of children: None   • Years of education: None   • Highest education level: None   Occupational History   • None   Tobacco Use   • Smoking status: Never   • Smokeless tobacco: Never   Vaping Use   • Vaping Use: Never used   Substance and Sexual Activity   • Alcohol use:  Yes   • Drug use: Never   • Sexual activity: None   Other Topics Concern   • None   Social History Narrative   • None     Social Determinants of Health     Financial Resource Strain: Not on file   Food Insecurity: Not on file   Transportation Needs: Not on file   Physical Activity: Not on file   Stress: Not on file   Social Connections: Not on file   Intimate Partner Violence: Not on file   Housing Stability: Not on file     Current Outpatient Medications on File Prior to Visit   Medication Sig   • fluticasone (FLONASE) 50 mcg/act nasal spray SPRAY 2 SPRAYS INTO EACH NOSTRIL EVERY DAY   • hydrochlorothiazide (HYDRODIURIL) 25 mg tablet TAKE 1 TABLET BY MOUTH EVERY DAY   • levothyroxine 25 mcg tablet TAKE 1 TABLET BY MOUTH EVERY DAY   • losartan (COZAAR) 25 mg tablet TAKE 1 TABLET BY MOUTH EVERY DAY   • simvastatin (ZOCOR) 40 mg tablet Take 1 tablet (40 mg total) by mouth daily at bedtime     Allergies   Allergen Reactions   • Nickel Rash     Immunization History   Administered Date(s) Administered   • COVID-19 PFIZER VACCINE 0 3 ML IM 03/09/2021, 03/29/2021   • [address] Vac BIVALENT Yoan-sucrose 12 Yr+ IM (BOOSTER ONLY) 12/07/2022   • INFLUENZA 09/09/2015   • Influenza Split High Dose Preservative Free IM 09/09/2015       Objective     /70   Pulse 81   Resp 16   Ht 5' 4" (1 626 m)   Wt 84 4 kg (186 lb)   SpO2 99%   BMI 31 93 kg/m²     Physical Exam  Gilberto Will MD

## 2023-01-10 NOTE — ASSESSMENT & PLAN NOTE
She was treated for URI in last month and she still has ongoing cough, with some clear mucus, no fever    She is advised to use over-the-counter Claritin 1 tablet daily and use prednisone for 5 days to reduce the congestion, her WBC count is normal

## 2023-02-13 PROBLEM — J06.9 VIRAL UPPER RESPIRATORY TRACT INFECTION: Status: RESOLVED | Noted: 2017-06-19 | Resolved: 2023-02-13

## 2023-03-11 PROBLEM — R05.9 COUGH: Status: RESOLVED | Noted: 2023-01-10 | Resolved: 2023-03-11

## 2023-07-11 ENCOUNTER — RA CDI HCC (OUTPATIENT)
Dept: OTHER | Facility: HOSPITAL | Age: 73
End: 2023-07-11

## 2023-07-11 NOTE — PROGRESS NOTES
[address] [facility] coding opportunities       Chart reviewed, no opportunity found: CHART REVIEWED, NO OPPORTUNITY FOUND        Patients Insurance     Medicare Insurance: Medicare

## 2023-07-12 ENCOUNTER — APPOINTMENT (OUTPATIENT)
Dept: LAB | Facility: CLINIC | Age: 73
End: 2023-07-12
Payer: MEDICARE

## 2023-07-12 DIAGNOSIS — E78.5 HYPERLIPIDEMIA, UNSPECIFIED HYPERLIPIDEMIA TYPE: ICD-10-CM

## 2023-07-12 DIAGNOSIS — I10 ESSENTIAL HYPERTENSION: ICD-10-CM

## 2023-07-12 LAB
ALBUMIN SERPL BCP-MCNC: 4.5 G/DL (ref 3.5–5)
ALP SERPL-CCNC: 50 U/L (ref 34–104)
ALT SERPL W P-5'-P-CCNC: 11 U/L (ref 7–52)
ANION GAP SERPL CALCULATED.3IONS-SCNC: 5 MMOL/L
AST SERPL W P-5'-P-CCNC: 11 U/L (ref 13–39)
BASOPHILS # BLD AUTO: 0.03 THOUSANDS/ÂΜL (ref 0–0.1)
BASOPHILS NFR BLD AUTO: 1 % (ref 0–1)
BILIRUB SERPL-MCNC: 0.75 MG/DL (ref 0.2–1)
BUN SERPL-MCNC: 17 MG/DL (ref 5–25)
CALCIUM SERPL-MCNC: 10.1 MG/DL (ref 8.4–10.2)
CHLORIDE SERPL-SCNC: 105 MMOL/L (ref 96–108)
CHOLEST SERPL-MCNC: 170 MG/DL
CO2 SERPL-SCNC: 29 MMOL/L (ref 21–32)
CREAT SERPL-MCNC: 0.85 MG/DL (ref 0.6–1.3)
EOSINOPHIL # BLD AUTO: 0.3 THOUSAND/ÂΜL (ref 0–0.61)
EOSINOPHIL NFR BLD AUTO: 6 % (ref 0–6)
ERYTHROCYTE [DISTWIDTH] IN BLOOD BY AUTOMATED COUNT: 13 % (ref 11.6–15.1)
GFR SERPL CREATININE-BSD FRML MDRD: 68 ML/MIN/1.73SQ M
GLUCOSE P FAST SERPL-MCNC: 87 MG/DL (ref 65–99)
HCT VFR BLD AUTO: 43.9 % (ref 34.8–46.1)
HDLC SERPL-MCNC: 56 MG/DL
HGB BLD-MCNC: 14.8 G/DL (ref 11.5–15.4)
IMM GRANULOCYTES # BLD AUTO: 0.02 THOUSAND/UL (ref 0–0.2)
IMM GRANULOCYTES NFR BLD AUTO: 0 % (ref 0–2)
LDLC SERPL CALC-MCNC: 98 MG/DL (ref 0–100)
LYMPHOCYTES # BLD AUTO: 1.46 THOUSANDS/ÂΜL (ref 0.6–4.47)
LYMPHOCYTES NFR BLD AUTO: 29 % (ref 14–44)
MCH RBC QN AUTO: 32.7 PG (ref 26.8–34.3)
MCHC RBC AUTO-ENTMCNC: 33.7 G/DL (ref 31.4–37.4)
MCV RBC AUTO: 97 FL (ref 82–98)
MONOCYTES # BLD AUTO: 0.61 THOUSAND/ÂΜL (ref 0.17–1.22)
MONOCYTES NFR BLD AUTO: 12 % (ref 4–12)
NEUTROPHILS # BLD AUTO: 2.64 THOUSANDS/ÂΜL (ref 1.85–7.62)
NEUTS SEG NFR BLD AUTO: 52 % (ref 43–75)
NONHDLC SERPL-MCNC: 114 MG/DL
NRBC BLD AUTO-RTO: 0 /100 WBCS
PLATELET # BLD AUTO: 263 THOUSANDS/UL (ref 149–390)
PMV BLD AUTO: 8.7 FL (ref 8.9–12.7)
POTASSIUM SERPL-SCNC: 4.3 MMOL/L (ref 3.5–5.3)
PROT SERPL-MCNC: 7.1 G/DL (ref 6.4–8.4)
RBC # BLD AUTO: 4.52 MILLION/UL (ref 3.81–5.12)
SODIUM SERPL-SCNC: 139 MMOL/L (ref 135–147)
TRIGL SERPL-MCNC: 78 MG/DL
TSH SERPL DL<=0.05 MIU/L-ACNC: 3.35 UIU/ML (ref 0.45–4.5)
WBC # BLD AUTO: 5.06 THOUSAND/UL (ref 4.31–10.16)

## 2023-07-12 PROCEDURE — 80061 LIPID PANEL: CPT

## 2023-07-12 PROCEDURE — 85025 COMPLETE CBC W/AUTO DIFF WBC: CPT

## 2023-07-12 PROCEDURE — 36415 COLL VENOUS BLD VENIPUNCTURE: CPT

## 2023-07-12 PROCEDURE — 80053 COMPREHEN METABOLIC PANEL: CPT

## 2023-07-12 PROCEDURE — 84443 ASSAY THYROID STIM HORMONE: CPT

## 2023-07-17 ENCOUNTER — OFFICE VISIT (OUTPATIENT)
Dept: FAMILY MEDICINE CLINIC | Facility: CLINIC | Age: 73
End: 2023-07-17
Payer: MEDICARE

## 2023-07-17 VITALS
SYSTOLIC BLOOD PRESSURE: 122 MMHG | OXYGEN SATURATION: 98 % | HEART RATE: 90 BPM | DIASTOLIC BLOOD PRESSURE: 80 MMHG | BODY MASS INDEX: 32.27 KG/M2 | WEIGHT: 189 LBS | HEIGHT: 64 IN

## 2023-07-17 DIAGNOSIS — E78.5 HYPERLIPIDEMIA, UNSPECIFIED HYPERLIPIDEMIA TYPE: Primary | ICD-10-CM

## 2023-07-17 DIAGNOSIS — I10 ESSENTIAL HYPERTENSION: ICD-10-CM

## 2023-07-17 DIAGNOSIS — E03.9 ACQUIRED HYPOTHYROIDISM: ICD-10-CM

## 2023-07-17 DIAGNOSIS — Z00.00 MEDICARE ANNUAL WELLNESS VISIT, SUBSEQUENT: ICD-10-CM

## 2023-07-17 PROBLEM — M77.02 MEDIAL EPICONDYLITIS OF ELBOW, LEFT: Status: RESOLVED | Noted: 2021-09-23 | Resolved: 2023-07-17

## 2023-07-17 PROBLEM — G56.22 CUBITAL TUNNEL SYNDROME ON LEFT: Status: RESOLVED | Noted: 2021-09-23 | Resolved: 2023-07-17

## 2023-07-17 PROCEDURE — G0439 PPPS, SUBSEQ VISIT: HCPCS | Performed by: FAMILY MEDICINE

## 2023-07-17 PROCEDURE — 99214 OFFICE O/P EST MOD 30 MIN: CPT | Performed by: FAMILY MEDICINE

## 2023-07-17 RX ORDER — SIMVASTATIN 40 MG
40 TABLET ORAL
Qty: 90 TABLET | Refills: 2 | Status: SHIPPED | OUTPATIENT
Start: 2023-07-17

## 2023-07-17 NOTE — PROGRESS NOTES
Assessment and Plan:     Problem List Items Addressed This Visit        Endocrine    Hypothyroidism     Lab Results   Component Value Date    KLI5LFUTOLMY 3.354 07/12/2023   Continue same dose of levothyroxine and recheck TSH in 6 months           Relevant Orders    CBC and differential    Comprehensive metabolic panel    Lipid panel    TSH, 3rd generation       Cardiovascular and Mediastinum    Essential hypertension     Hypertension is stable, continue low-salt diet, continue same medications. Other    Hyperlipidemia - Primary     Lab Results   Component Value Date    LDLCALC 98 07/12/2023   cont same dose of simvastatin           Relevant Medications    simvastatin (ZOCOR) 40 mg tablet    Other Relevant Orders    CBC and differential    Comprehensive metabolic panel    Lipid panel    Medicare annual wellness visit, subsequent      Discussed about losing some weight as she has some weight since last visit  Refused for DEXA scan and she has a slip for mammogram and she will think about getting it. BMI Counseling: There is no height or weight on file to calculate BMI. The BMI is above normal. Nutrition recommendations include decreasing portion sizes, encouraging healthy choices of fruits and vegetables, limiting drinks that contain sugar, moderation in carbohydrate intake and reducing intake of cholesterol. Exercise recommendations include exercising 3-5 times per week. Rationale for BMI follow-up plan is due to patient being overweight or obese. Depression Screening and Follow-up Plan: Patient was screened for depression during today's encounter. They screened negative with a PHQ-2 score of 0. Preventive health issues were discussed with patient, and age appropriate screening tests were ordered as noted in patient's After Visit Summary.   Personalized health advice and appropriate referrals for health education or preventive services given if needed, as noted in patient's After Visit Summary. History of Present Illness:     Patient presents for Medicare Annual Wellness visit    Patient Care Team:  Thaddeus Flores MD as PCP - General  MD Ruby Borja MD as Endoscopist     Problem List:     Patient Active Problem List   Diagnosis   • Essential hypertension   • Excess or deficiency of vitamin D   • Hyperlipidemia   • Hypothyroidism   • Screening for cardiovascular condition   • Screen for colon cancer   • Medicare annual wellness visit, subsequent   • Menopause      Past Medical and Surgical History:     Past Medical History:   Diagnosis Date   • Disease of thyroid gland    • Hyperlipidemia      Past Surgical History:   Procedure Laterality Date   • BREAST BIOPSY Right 04/26/2012   • COLONOSCOPY N/A 3/20/2019    Procedure: COLONOSCOPY;  Surgeon: Ruby Rodriguez MD;  Location: [facility]; Service: Gastroenterology   • TONSILLECTOMY        Family History:     Family History   Problem Relation Age of Onset   • No Known Problems Sister    • No Known Problems Daughter    • Bone cancer Maternal Grandmother    • Brain cancer Maternal Aunt    • No Known Problems Maternal Uncle    • No Known Problems Daughter       Social History:     E-Cigarette/Vaping   • E-Cigarette Use Never User      E-Cigarette/Vaping Substances   • Nicotine No    • THC No    • CBD No    • Flavoring No    • Other No    • Unknown No      Social History     Socioeconomic History   • Marital status: /Civil Union     Spouse name: None   • Number of children: None   • Years of education: None   • Highest education level: None   Occupational History   • None   Tobacco Use   • Smoking status: Never   • Smokeless tobacco: Never   Vaping Use   • Vaping Use: Never used   Substance and Sexual Activity   • Alcohol use:  Yes   • Drug use: Never   • Sexual activity: None   Other Topics Concern   • None   Social History Narrative   • None     Social Determinants of Health     Financial Resource Strain: Low Risk (7/17/2023)    Overall Financial Resource Strain (CARDIA)    • Difficulty of Paying Living Expenses: Not very hard   Food Insecurity: Not on file   Transportation Needs: No Transportation Needs (7/17/2023)    PRAPARE - Transportation    • Lack of Transportation (Medical): No    • Lack of Transportation (Non-Medical): No   Physical Activity: Not on file   Stress: Not on file   Social Connections: Not on file   Intimate Partner Violence: Not on file   Housing Stability: Not on file      Medications and Allergies:     Current Outpatient Medications   Medication Sig Dispense Refill   • fluticasone (FLONASE) 50 mcg/act nasal spray SPRAY 2 SPRAYS INTO EACH NOSTRIL EVERY DAY 16 mL 0   • hydrochlorothiazide (HYDRODIURIL) 25 mg tablet TAKE 1 TABLET BY MOUTH EVERY DAY 90 tablet 1   • levothyroxine 25 mcg tablet TAKE 1 TABLET BY MOUTH EVERY DAY 90 tablet 1   • losartan (COZAAR) 25 mg tablet TAKE 1 TABLET BY MOUTH EVERY DAY 90 tablet 1   • simvastatin (ZOCOR) 40 mg tablet Take 1 tablet (40 mg total) by mouth daily at bedtime 90 tablet 2     No current facility-administered medications for this visit.      Allergies   Allergen Reactions   • Nickel Rash      Immunizations:     Immunization History   Administered Date(s) Administered   • COVID-19 PFIZER VACCINE 0.3 ML IM 03/09/2021, 03/29/2021   • COVID-19 Pfizer Vac BIVALENT Yoan-sucrose 12 Yr+ IM (BOOSTER ONLY) 12/07/2022   • INFLUENZA 09/09/2015   • Influenza Split High Dose Preservative Free IM 09/09/2015      Health Maintenance:         Topic Date Due   • Breast Cancer Screening: Mammogram  06/23/2022   • Colorectal Cancer Screening  03/20/2024   • Hepatitis C Screening  Completed         Topic Date Due   • COVID-19 Vaccine (4 - Pfizer series) 04/07/2023   • Influenza Vaccine (1) 09/01/2023      Medicare Health Risk Assessment:     /80   Pulse 90   Ht 5' 4" (1.626 m)   Wt 85.7 kg (189 lb)   SpO2 98%   BMI 32.44 kg/m²      Namrata Montoya is here for her Subsequent Wellness visit. Last Medicare Wellness visit information reviewed, patient interviewed, no change since last AWV. Health Risk Assessment:   Patient rates overall health as good. Patient feels that their physical health rating is same. Patient is satisfied with their life. Eyesight was rated as same. Hearing was rated as same. Patient feels that their emotional and mental health rating is same. Patients states they are never, rarely angry. Patient states they are sometimes unusually tired/fatigued. Pain experienced in the last 7 days has been none. Patient states that she has experienced no weight loss or gain in last 6 months. Depression Screening:   PHQ-2 Score: 0      Fall Risk Screening: In the past year, patient has experienced: no history of falling in past year      Urinary Incontinence Screening:   Patient has not leaked urine accidently in the last six months. Home Safety:  Patient does not have trouble with stairs inside or outside of their home. Patient has working smoke alarms and has working carbon monoxide detector. Home safety hazards include: none. Nutrition:   Current diet is Low Cholesterol. Medications:   Patient is not currently taking any over-the-counter supplements. Patient is able to manage medications. Activities of Daily Living (ADLs)/Instrumental Activities of Daily Living (IADLs):   Walk and transfer into and out of bed and chair?: Yes  Dress and groom yourself?: Yes    Bathe or shower yourself?: Yes    Feed yourself?  Yes  Do your laundry/housekeeping?: Yes  Manage your money, pay your bills and track your expenses?: Yes  Make your own meals?: Yes    Do your own shopping?: Yes    Previous Hospitalizations:   Any hospitalizations or ED visits within the last 12 months?: No      Advance Care Planning:   Living will: No    Durable POA for healthcare: No    Advanced directive: No      Cognitive Screening:   Provider or family/friend/caregiver concerned regarding cognition?: No    PREVENTIVE SCREENINGS      Cardiovascular Screening:    General: Screening Not Indicated and History Lipid Disorder      Diabetes Screening:     General: Screening Current      Colorectal Cancer Screening:     General: Screening Current      Breast Cancer Screening:     General: Screening Current and Risks and Benefits Discussed    Due for: Mammogram        Cervical Cancer Screening:    General: Screening Not Indicated      Osteoporosis Screening:    General: Risks and Benefits Discussed and Patient Declines      Abdominal Aortic Aneurysm (AAA) Screening:        General: Screening Not Indicated      Lung Cancer Screening:     General: Screening Not Indicated      Hepatitis C Screening:    General: Screening Current    Screening, Brief Intervention, and Referral to Treatment (SBIRT)    Screening  Typical number of drinks in a day: 1  Typical number of drinks in a week: 7  Interpretation: Low risk drinking behavior. AUDIT-C Screenin) How often did you have a drink containing alcohol in the past year? 4 or more times a week  2) How many drinks did you have on a typical day when you were drinking in the past year? 3 to 4  3) How often did you have 6 or more drinks on one occasion in the past year? never    AUDIT-C Score: 4  Interpretation: Score 3-12 (female): POSITIVE screen for alcohol misuse    AUDIT Screenin) How often during the last year have you found that you were not able to stop drinking once you had started? 0 - never  5) How often during the last year have you failed to do what was normally expected from you because of drinking? 0 - never  6) How often during the last year have you needed a first drink in the morning to get yourself going after a heavy drinking session?  0 - never  7) How often during the last year have you had a feeling of guilt or remorse after drinking? 0 - never  8) How often during the last year have you been unable to remember what happened the night before because you had been drinking? 0 - never  9) Have you or someone else been injured as a result of your drinking? 0 - no  10) Has a relative or friend or a doctor or another health worker been concerned about your drinking or suggested you cut down? 0 - no    AUDIT Score: 4  Interpretation: Low risk alcohol consumption    Single Item Drug Screening:  How often have you used an illegal drug (including marijuana) or a prescription medication for non-medical reasons in the past year? never    Single Item Drug Screen Score: 0  Interpretation: Negative screen for possible drug use disorder    Review of Current Opioid Use    Opioid Risk Tool (ORT) Interpretation: Complete Opioid Risk Tool (ORT)    Other Counseling Topics:   Regular weightbearing exercise and calcium and vitamin D intake. Mariangel Rinaldi MD    AWV, and follow up, she had her labs done, she has hypertension, hyperlipidemia, hypothyroid, taking all her medications, overall feeling fine, lives with her spouse, she drinks 1 alcohol drink every day, denies any headache, chest pain or shortness of breath,      Review of Systems   Constitutional: Negative for activity change, appetite change, chills, fatigue, fever and unexpected weight change. HENT: Negative for congestion, ear discharge, ear pain, nosebleeds, postnasal drip, rhinorrhea, sinus pressure, sneezing, sore throat, trouble swallowing and voice change. Eyes: Negative for photophobia, pain, discharge, redness and itching. Wear glasses   Respiratory: Negative for cough, chest tightness, shortness of breath and wheezing. Cardiovascular: Negative for chest pain, palpitations and leg swelling. Gastrointestinal: Negative for abdominal pain, constipation, diarrhea, nausea and vomiting. Endocrine: Negative for polyuria. Genitourinary: Negative for dysuria, frequency and urgency. Musculoskeletal: Negative for arthralgias, back pain, myalgias and neck pain.    Skin: Negative for color change, pallor and rash. Allergic/Immunologic: Negative for environmental allergies and food allergies. Neurological: Negative for dizziness, weakness, light-headedness and headaches. Hematological: Negative for adenopathy. Does not bruise/bleed easily. Psychiatric/Behavioral: Positive for sleep disturbance. Negative for behavioral problems. The patient is not nervous/anxious. Physical Exam  Vitals and nursing note reviewed. Constitutional:       General: She is not in acute distress. Appearance: Normal appearance. She is not ill-appearing. HENT:      Head: Normocephalic and atraumatic. Right Ear: Tympanic membrane and ear canal normal. There is no impacted cerumen. Left Ear: Tympanic membrane and ear canal normal. There is no impacted cerumen. Nose: Nose normal. No congestion or rhinorrhea. Mouth/Throat:      Mouth: Mucous membranes are moist.      Pharynx: Oropharynx is clear. No oropharyngeal exudate or posterior oropharyngeal erythema. Eyes:      General: No scleral icterus. Right eye: No discharge. Left eye: No discharge. Extraocular Movements: Extraocular movements intact. Conjunctiva/sclera: Conjunctivae normal.      Pupils: Pupils are equal, round, and reactive to light. Cardiovascular:      Rate and Rhythm: Normal rate and regular rhythm. Heart sounds: Normal heart sounds. No murmur heard. Pulmonary:      Effort: Pulmonary effort is normal.      Breath sounds: Normal breath sounds. No wheezing or rales. Abdominal:      General: Abdomen is flat. Bowel sounds are normal. There is no distension. Palpations: Abdomen is soft. There is no mass. Tenderness: There is no abdominal tenderness. Musculoskeletal:         General: No swelling, tenderness or deformity. Normal range of motion. Cervical back: Normal range of motion and neck supple. No muscular tenderness. Right lower leg: No edema. Left lower leg: No edema. Lymphadenopathy:      Cervical: No cervical adenopathy. Skin:     Coloration: Skin is not jaundiced or pale. Findings: No erythema, lesion or rash. Neurological:      General: No focal deficit present. Mental Status: She is alert and oriented to person, place, and time.       Gait: Gait normal.   Psychiatric:         Mood and Affect: Mood normal.         Behavior: Behavior normal.

## 2023-07-17 NOTE — PATIENT INSTRUCTIONS
Medicare Preventive Visit Patient Instructions  Thank you for completing your Welcome to Medicare Visit or Medicare Annual Wellness Visit today. Your next wellness visit will be due in one year (7/17/2024). The screening/preventive services that you may require over the next 5-10 years are detailed below. Some tests may not apply to you based off risk factors and/or age. Screening tests ordered at today's visit but not completed yet may show as past due. Also, please note that scanned in results may not display below. Preventive Screenings:  Service Recommendations Previous Testing/Comments   Colorectal Cancer Screening  * Colonoscopy    * Fecal Occult Blood Test (FOBT)/Fecal Immunochemical Test (FIT)  * Fecal DNA/Cologuard Test  * Flexible Sigmoidoscopy Age: 43-73 years old   Colonoscopy: every 10 years (may be performed more frequently if at higher risk)  OR  FOBT/FIT: every 1 year  OR  Cologuard: every 3 years  OR  Sigmoidoscopy: every 5 years  Screening may be recommended earlier than age 39 if at higher risk for colorectal cancer. Also, an individualized decision between you and your healthcare provider will decide whether screening between the ages of 77-80 would be appropriate. Colonoscopy: 03/20/2019  FOBT/FIT: Not on file  Cologuard: Not on file  Sigmoidoscopy: Not on file    Screening Current     Breast Cancer Screening Age: 36 years old  Frequency: every 1-2 years  Not required if history of left and right mastectomy Mammogram: 06/23/2021    Screening Current   Cervical Cancer Screening Between the ages of 21-29, pap smear recommended once every 3 years. Between the ages of 32-69, can perform pap smear with HPV co-testing every 5 years.    Recommendations may differ for women with a history of total hysterectomy, cervical cancer, or abnormal pap smears in past. Pap Smear: Not on file    Screening Not Indicated   Hepatitis C Screening Once for adults born between 1945 and 1965  More frequently in patients at high risk for Hepatitis C Hep C Antibody: 12/04/2020    Screening Current   Diabetes Screening 1-2 times per year if you're at risk for diabetes or have pre-diabetes Fasting glucose: 87 mg/dL (7/12/2023)  A1C: No results in last 5 years (No results in last 5 years)  Screening Current   Cholesterol Screening Once every 5 years if you don't have a lipid disorder. May order more often based on risk factors. Lipid panel: 07/12/2023    Screening Not Indicated  History Lipid Disorder     Other Preventive Screenings Covered by Medicare:  1. Abdominal Aortic Aneurysm (AAA) Screening: covered once if your at risk. You're considered to be at risk if you have a family history of AAA. 2. Lung Cancer Screening: covers low dose CT scan once per year if you meet all of the following conditions: (1) Age 48-67; (2) No signs or symptoms of lung cancer; (3) Current smoker or have quit smoking within the last 15 years; (4) You have a tobacco smoking history of at least 20 pack years (packs per day multiplied by number of years you smoked); (5) You get a written order from a healthcare provider. 3. Glaucoma Screening: covered annually if you're considered high risk: (1) You have diabetes OR (2) Family history of glaucoma OR (3)  aged 48 and older OR (3)  American aged 72 and older  3. Osteoporosis Screening: covered every 2 years if you meet one of the following conditions: (1) You're estrogen deficient and at risk for osteoporosis based off medical history and other findings; (2) Have a vertebral abnormality; (3) On glucocorticoid therapy for more than 3 months; (4) Have primary hyperparathyroidism; (5) On osteoporosis medications and need to assess response to drug therapy. · Last bone density test (DXA Scan): 06/23/2021.  5. HIV Screening: covered annually if you're between the age of 15-65. Also covered annually if you are younger than 13 and older than 72 with risk factors for HIV infection. For pregnant patients, it is covered up to 3 times per pregnancy. Immunizations:  Immunization Recommendations   Influenza Vaccine Annual influenza vaccination during flu season is recommended for all persons aged >= 6 months who do not have contraindications   Pneumococcal Vaccine   * Pneumococcal conjugate vaccine = PCV13 (Prevnar 13), PCV15 (Vaxneuvance), PCV20 (Prevnar 20)  * Pneumococcal polysaccharide vaccine = PPSV23 (Pneumovax) Adults 20-63 years old: 1-3 doses may be recommended based on certain risk factors  Adults 72 years old: 1-2 doses may be recommended based off what pneumonia vaccine you previously received   Hepatitis B Vaccine 3 dose series if at intermediate or high risk (ex: diabetes, end stage renal disease, liver disease)   Tetanus (Td) Vaccine - COST NOT COVERED BY MEDICARE PART B Following completion of primary series, a booster dose should be given every 10 years to maintain immunity against tetanus. Td may also be given as tetanus wound prophylaxis. Tdap Vaccine - COST NOT COVERED BY MEDICARE PART B Recommended at least once for all adults. For pregnant patients, recommended with each pregnancy. Shingles Vaccine (Shingrix) - COST NOT COVERED BY MEDICARE PART B  2 shot series recommended in those aged 48 and above     Health Maintenance Due:      Topic Date Due   • Breast Cancer Screening: Mammogram  06/23/2022   • Colorectal Cancer Screening  03/20/2024   • Hepatitis C Screening  Completed     Immunizations Due:      Topic Date Due   • COVID-19 Vaccine (4 - Pfizer series) 04/07/2023   • Influenza Vaccine (1) 09/01/2023     Advance Directives   What are advance directives? Advance directives are legal documents that state your wishes and plans for medical care. These plans are made ahead of time in case you lose your ability to make decisions for yourself. Advance directives can apply to any medical decision, such as the treatments you want, and if you want to donate organs.    What are the types of advance directives? There are many types of advance directives, and each state has rules about how to use them. You may choose a combination of any of the following:  · Living will: This is a written record of the treatment you want. You can also choose which treatments you do not want, which to limit, and which to stop at a certain time. This includes surgery, medicine, IV fluid, and tube feedings. · Durable power of  for [facility]): This is a written record that states who you want to make healthcare choices for you when you are unable to make them for yourself. This person, called a proxy, is usually a family member or a friend. You may choose more than 1 proxy. · Do not resuscitate (DNR) order:  A DNR order is used in case your heart stops beating or you stop breathing. It is a request not to have certain forms of treatment, such as CPR. A DNR order may be included in other types of advance directives. · Medical directive: This covers the care that you want if you are in a coma, near death, or unable to make decisions for yourself. You can list the treatments you want for each condition. Treatment may include pain medicine, surgery, blood transfusions, dialysis, IV or tube feedings, and a ventilator (breathing machine). · Values history: This document has questions about your views, beliefs, and how you feel and think about life. This information can help others choose the care that you would choose. Why are advance directives important? An advance directive helps you control your care. Although spoken wishes may be used, it is better to have your wishes written down. Spoken wishes can be misunderstood, or not followed. Treatments may be given even if you do not want them. An advance directive may make it easier for your family to make difficult choices about your care.    Weight Management   Why it is important to manage your weight:  Being overweight increases your risk of health conditions such as heart disease, high blood pressure, type 2 diabetes, and certain types of cancer. It can also increase your risk for osteoarthritis, sleep apnea, and other respiratory problems. Aim for a slow, steady weight loss. Even a small amount of weight loss can lower your risk of health problems. How to lose weight safely:  A safe and healthy way to lose weight is to eat fewer calories and get regular exercise. You can lose up about 1 pound a week by decreasing the number of calories you eat by 500 calories each day. Healthy meal plan for weight management:  A healthy meal plan includes a variety of foods, contains fewer calories, and helps you stay healthy. A healthy meal plan includes the following:  · Eat whole-grain foods more often. A healthy meal plan should contain fiber. Fiber is the part of grains, fruits, and vegetables that is not broken down by your body. Whole-grain foods are healthy and provide extra fiber in your diet. Some examples of whole-grain foods are whole-wheat breads and pastas, oatmeal, brown rice, and bulgur. · Eat a variety of vegetables every day. Include dark, leafy greens such as spinach, kale, evelyn greens, and mustard greens. Eat yellow and orange vegetables such as carrots, sweet potatoes, and winter squash. · Eat a variety of fruits every day. Choose fresh or canned fruit (canned in its own juice or light syrup) instead of juice. Fruit juice has very little or no fiber. · Eat low-fat dairy foods. Drink fat-free (skim) milk or 1% milk. Eat fat-free yogurt and low-fat cottage cheese. Try low-fat cheeses such as mozzarella and other reduced-fat cheeses. · Choose meat and other protein foods that are low in fat. Choose beans or other legumes such as split peas or lentils. Choose fish, skinless poultry (chicken or turkey), or lean cuts of red meat (beef or pork). Before you cook meat or poultry, cut off any visible fat. · Use less fat and oil. Try baking foods instead of frying them. Add less fat, such as margarine, sour cream, regular salad dressing and mayonnaise to foods. Eat fewer high-fat foods. Some examples of high-fat foods include french fries, doughnuts, ice cream, and cakes. · Eat fewer sweets. Limit foods and drinks that are high in sugar. This includes candy, cookies, regular soda, and sweetened drinks. Exercise:  Exercise at least 30 minutes per day on most days of the week. Some examples of exercise include walking, biking, dancing, and swimming. You can also fit in more physical activity by taking the stairs instead of the elevator or parking farther away from stores. Ask your healthcare provider about the best exercise plan for you. Alcohol Use and Your Health    Drinking too much can harm your health. Excessive alcohol use leads to about 88,000 death in the [facility] each year, and shortens the life of those who diet by almost 30 years. Further, excessive drinking cost the economy $249 billion in 2010. Most excessive drinkers are not alcohol dependent. Excessive alcohol use has immediate effects that increase the risk of many harmful health conditions. These are most often the result of binge drinking. Over time, excessive alcohol use can lead to the development of chronic diseases and other series health problems. What is considered a "drink"? Excessive alcohol use includes:  · Binge Drinking: For women, 4 or more drinks consumed on one occasion. For men, 5 or more drinks consumed on one occasion. · Heavy Drinking: For women, 8 or more drinks per week. For men, 15 or more drinks per week  · Any alcohol used by pregnant women  · Any alcohol used by those under the age of 21 years    If you choose to drink, do so in moderation:  · Do not drink at all if you are under the age of 24, or if you are or may be pregnant, or have health problems that could be made worse by drinking.   · For women, up to 1 drink per day  · For men, up to 2 drinks a day    No one should begin drinking or drink more frequently based on potential health benefits    Short-Term Health Risks:  · Injuries: motor vehicle crashes, falls, drownings, burns  · Violence: homicide, suicide, sexual assault, intimate partner violence  · Alcohol poisoning  · Reproductive health: risky sexual behaviors, unintended prengnacy, sexually transmitted diseases, miscarriage, stillbirth, fetal alcohol syndrome    Long-Term Health Risks:  · Chronic diseases: high blood pressure, heart disease, stroke, liver disease, digestive problems  · Cancers: breast, mouth and throat, liver, colon  · Learning and memory problems: dementia, poor school performance  · Mental health: depression, anxiety, insomnia  · Social problems: lost productivity, family problems, unemployment  · Alcohol dependence    For support and more information:  · Substance Abuse and [address] [place], [place] [address]  Web Address: https://Pharnext/    · Alcoholics Anonymous        Web Address: http://www.[employer]steoplastics/    https://www.cdc.gov/alcohol/fact-sheets/alcohol-use.htm  Narcotic (Opioid) Safety    Use narcotics safely:  · Take prescribed narcotics exactly as directed  · Do not give narcotics to others or take narcotics that belong to someone else  · Do not mix narcotics without medicines or alcohol  · Do not drive or operate heavy machinery after you take the narcotic  · Monitor for side effects and notify your healthcare provider if you experienced side effects such as nausea, sleepiness, itching, or trouble thinking clearly. Manage constipation:    Constipation is the most common side effect of narcotic medicine. Constipation is when you have hard, dry bowel movements, or you go longer than usual between bowel movements. Tell your healthcare provider about all changes in your bowel movements while you are taking narcotics.  He or she may recommend laxative medicine to help you have a bowel movement. He or she may also change the kind of narcotic you are taking, or change when you take it. The following are more ways you can prevent or relieve constipation:    · Drink liquids as directed. You may need to drink extra liquids to help soften and move your bowels. Ask how much liquid to drink each day and which liquids are best for you. · Eat high-fiber foods. This may help decrease constipation by adding bulk to your bowel movements. High-fiber foods include fruits, vegetables, whole-grain breads and cereals, and beans. Your healthcare provider or dietitian can help you create a high-fiber meal plan. Your provider may also recommend a fiber supplement if you cannot get enough fiber from food. · Exercise regularly. Regular physical activity can help stimulate your intestines. Walking is a good exercise to prevent or relieve constipation. Ask which exercises are best for you. · Schedule a time each day to have a bowel movement. This may help train your body to have regular bowel movements. Bend forward while you are on the toilet to help move the bowel movement out. Sit on the toilet for at least 10 minutes, even if you do not have a bowel movement. Store narcotics safely:   · Store narcotics where others cannot easily get them. Keep them in a locked cabinet or secure area. Do not  keep them in a purse or other bag you carry with you. A person may be looking for something else and find the narcotics. · Make sure narcotics are stored out of the reach of children. A child can easily overdose on narcotics. Narcotics may look like candy to a small child. The best way to dispose of narcotics: The laws vary by country and area. In the [facility], the best way is to return the narcotics through a take-back program. This program is offered by the [employer] ([employer]).  The following are options for using the program:  · Take the narcotics to a LUCIA collection site. The site is often a law enforcement center. Call your local law enforcement center for scheduled take-back days in your area. You will be given information on where to go if the collection site is in a different location. · Take the narcotics to an approved pharmacy or hospital.  A pharmacy or hospital may be set up as a collection site. You will need to ask if it is a LUCIA collection site if you were not directed there. A pharmacy or doctor's office may not be able to take back narcotics unless it is a LUCIA site. · Use a mail-back system. This means you are given containers to put the narcotics into. You will then mail them in the containers. · Use a take-back drop box. This is a place to leave the narcotics at any time. People and animals will not be able to get into the box. Your local law enforcement agency can tell you where to find a drop box in your area. Other ways to manage pain:   · Ask your healthcare provider about non-narcotic medicines to control pain. Nonprescription medicines include NSAIDs (such as ibuprofen) and acetaminophen. Prescription medicines include muscle relaxers, antidepressants, and steroids. · Pain may be managed without any medicines. Some ways to relieve pain include massage, aromatherapy, or meditation. Physical or occupational therapy may also help. For more information:   · Drug Enforcement Administration  [address] [facility] , [address] [facility]  Phone: 0- 323 - 823-7926  Web Address: GIS CloudGood Shepherd Healthcare SystemBoundary. WebPayThe city of [place]-the DAT[place].Huxiu.com/drug_disposal/    · [address] [facility] [address] and Drug Administration  [address] [facility] , [address] [facility] [address]  Phone: 5- 448 - [phone]  Web Address: http://FlexEl/     © Copyright [employer] 2018 Information is for End User's use only and may not be sold, redistributed or otherwise used for commercial purposes.  All illustrations and images included in CareNotes® are the copyrighted property of A.D.A.M., Inc. or [employer]

## 2023-07-17 NOTE — ASSESSMENT & PLAN NOTE
Lab Results   Component Value Date    [id number] 3.354 07/12/2023   Continue same dose of levothyroxine and recheck TSH in 6 months

## 2023-09-15 PROBLEM — Z00.00 MEDICARE ANNUAL WELLNESS VISIT, SUBSEQUENT: Status: RESOLVED | Noted: 2019-02-11 | Resolved: 2023-09-15

## 2023-10-11 ENCOUNTER — OFFICE VISIT (OUTPATIENT)
Dept: FAMILY MEDICINE CLINIC | Facility: CLINIC | Age: 73
End: 2023-10-11
Payer: MEDICARE

## 2023-10-11 VITALS
DIASTOLIC BLOOD PRESSURE: 78 MMHG | HEIGHT: 64 IN | HEART RATE: 85 BPM | RESPIRATION RATE: 18 BRPM | OXYGEN SATURATION: 98 % | BODY MASS INDEX: 32.27 KG/M2 | WEIGHT: 189 LBS | SYSTOLIC BLOOD PRESSURE: 122 MMHG | TEMPERATURE: 98.8 F

## 2023-10-11 DIAGNOSIS — J01.90 ACUTE SINUSITIS, RECURRENCE NOT SPECIFIED, UNSPECIFIED LOCATION: Primary | ICD-10-CM

## 2023-10-11 DIAGNOSIS — R09.81 NASAL CONGESTION: ICD-10-CM

## 2023-10-11 DIAGNOSIS — R05.9 COUGH, UNSPECIFIED TYPE: ICD-10-CM

## 2023-10-11 LAB
SARS-COV-2 AG UPPER RESP QL IA: NEGATIVE
VALID CONTROL: NORMAL

## 2023-10-11 PROCEDURE — 99213 OFFICE O/P EST LOW 20 MIN: CPT | Performed by: NURSE PRACTITIONER

## 2023-10-11 PROCEDURE — 87811 SARS-COV-2 COVID19 W/OPTIC: CPT | Performed by: NURSE PRACTITIONER

## 2023-10-11 RX ORDER — CEFDINIR 300 MG/1
300 CAPSULE ORAL EVERY 12 HOURS SCHEDULED
Qty: 14 CAPSULE | Refills: 0 | Status: SHIPPED | OUTPATIENT
Start: 2023-10-11 | End: 2023-10-18

## 2023-10-11 NOTE — PROGRESS NOTES
Name: Ce Wilhelm      : 1950      MRN: [id number]  Encounter Provider: Corinne Perch, CRNP  Encounter Date: 10/11/2023   Encounter department: [address] [place] [address]     1. Acute sinusitis, recurrence not specified, unspecified location  -     cefdinir (OMNICEF) 300 mg capsule; Take 1 capsule (300 mg total) by mouth every 12 (twelve) hours for 7 days    2. Cough, unspecified type  -     POCT Rapid Covid Ag    3. Nasal congestion  -     POCT Rapid Covid Ag        Patient reports that she started with a sore throat about 6 days ago while traveling. Patient reports nasal congestion and sinus pressure for the past 5 days. Patient also reports occasional cough and chills. Denies any wheezing or SOB. Patient  reports that she returned home from [place] on Monday. Patient did not test for COVID at home. Rapid COVID testing done in the office. Unable to get a complete sample because patient wanted me to stop. Patient reports that she does not have covid. Testing was negative. Discussed with the patient that the sample may not have been sufficient. Nasal congestion noted. Patient appears ill. Lungs are clear. Cefdinir prescribed for acute sinusitis. Medication information and side effects reviewed. Proper hydration reviewed. Patient instructed to follow-up if symptoms get worse or do not get better. Subjective      Patient reports that she started with a sore throat about 6 days ago while she was traveling. Patient reports nasal congestion and sinus pressure for the past 5 days. Patient also reports an occasional cough and chills. Denies any wheezing or SOB. Denies any vomiting or diarrhea. Patient reports that she returned home from [place] on Monday. Patient reports that her symptoms are not going away. Review of Systems   Constitutional:         As noted in HPI. HENT:          As noted in HPI.     Respiratory: As noted in HPI. Cardiovascular:  Negative for chest pain. Gastrointestinal:  Negative for abdominal pain, diarrhea, nausea and vomiting. Musculoskeletal:  Negative for myalgias. Skin:  Negative for rash. Neurological:  Negative for dizziness, light-headedness and headaches. Current Outpatient Medications on File Prior to Visit   Medication Sig    hydrochlorothiazide (HYDRODIURIL) 25 mg tablet TAKE 1 TABLET BY MOUTH EVERY DAY    levothyroxine 25 mcg tablet TAKE 1 TABLET BY MOUTH EVERY DAY    losartan (COZAAR) 25 mg tablet TAKE 1 TABLET BY MOUTH EVERY DAY    simvastatin (ZOCOR) 40 mg tablet Take 1 tablet (40 mg total) by mouth daily at bedtime    fluticasone (FLONASE) 50 mcg/act nasal spray SPRAY 2 SPRAYS INTO EACH NOSTRIL EVERY DAY (Patient not taking: Reported on 10/11/2023)       Objective     /78   Pulse 85   Temp 98.8 °F (37.1 °C)   Resp 18   Ht 5' 4" (1.626 m)   Wt 85.7 kg (189 lb)   SpO2 98%   BMI 32.44 kg/m²     Physical Exam  Vitals reviewed. Constitutional:       General: She is not in acute distress. Appearance: She is ill-appearing. She is not diaphoretic. HENT:      Right Ear: Tympanic membrane, ear canal and external ear normal.      Left Ear: Tympanic membrane, ear canal and external ear normal.      Nose: Congestion present. Mouth/Throat:      Mouth: Mucous membranes are moist.      Pharynx: Oropharynx is clear. No oropharyngeal exudate or posterior oropharyngeal erythema. Eyes:      Conjunctiva/sclera: Conjunctivae normal.      Pupils: Pupils are equal, round, and reactive to light. Cardiovascular:      Rate and Rhythm: Normal rate and regular rhythm. Pulses: Normal pulses. Heart sounds: Normal heart sounds. Pulmonary:      Effort: Pulmonary effort is normal. No respiratory distress. Breath sounds: Normal breath sounds. No wheezing. Comments: Dry cough. Musculoskeletal:      Comments: Gait wnl. Skin:     Findings: No rash. Neurological:      Mental Status: She is alert and oriented to person, place, and time.    Psychiatric:         Mood and Affect: Mood normal.       TANO Stout

## 2023-12-01 DIAGNOSIS — I10 BENIGN ESSENTIAL HYPERTENSION: ICD-10-CM

## 2023-12-01 DIAGNOSIS — I10 ESSENTIAL HYPERTENSION: ICD-10-CM

## 2023-12-01 RX ORDER — LOSARTAN POTASSIUM 25 MG/1
TABLET ORAL
Qty: 90 TABLET | Refills: 1 | Status: SHIPPED | OUTPATIENT
Start: 2023-12-01

## 2023-12-01 RX ORDER — HYDROCHLOROTHIAZIDE 25 MG/1
TABLET ORAL
Qty: 90 TABLET | Refills: 1 | Status: SHIPPED | OUTPATIENT
Start: 2023-12-01

## 2023-12-10 PROBLEM — J01.90 ACUTE SINUSITIS: Status: RESOLVED | Noted: 2023-10-11 | Resolved: 2023-12-10

## 2023-12-10 PROBLEM — R05.9 COUGH: Status: RESOLVED | Noted: 2023-01-10 | Resolved: 2023-12-10

## 2024-01-08 DIAGNOSIS — E03.9 HYPOTHYROIDISM, UNSPECIFIED TYPE: ICD-10-CM

## 2024-01-08 RX ORDER — LEVOTHYROXINE SODIUM 0.03 MG/1
25 TABLET ORAL DAILY
Qty: 90 TABLET | Refills: 1 | Status: SHIPPED | OUTPATIENT
Start: 2024-01-08

## 2024-01-08 NOTE — TELEPHONE ENCOUNTER
Reason for call:   [x] Refill   [] Prior Auth  [] Other:     Office:   [x] PCP/Provider - Carina Lafleur /Adiel WRAY  [] Specialty/Provider -     Medication: levothyroxine 25 mcg tablet     Dose/Frequency: TAKE 1 TABLET BY MOUTH EVERY DAY     Quantity: 90    Pharmacy: [facility]/pharmacy #[id number] - [place] - [address] [place] [address]     Does the patient have enough for 3 days?   [] Yes   [x] No - Send as HP to POD

## 2024-01-19 ENCOUNTER — RA CDI HCC (OUTPATIENT)
Dept: OTHER | Facility: HOSPITAL | Age: 74
End: 2024-01-19

## 2024-01-24 ENCOUNTER — APPOINTMENT (OUTPATIENT)
Dept: LAB | Facility: CLINIC | Age: 74
End: 2024-01-24
Payer: MEDICARE

## 2024-01-24 DIAGNOSIS — E03.9 ACQUIRED HYPOTHYROIDISM: ICD-10-CM

## 2024-01-24 DIAGNOSIS — E78.5 HYPERLIPIDEMIA, UNSPECIFIED HYPERLIPIDEMIA TYPE: ICD-10-CM

## 2024-01-24 LAB
ALBUMIN SERPL BCP-MCNC: 4.4 G/DL (ref 3.5–5)
ALP SERPL-CCNC: 43 U/L (ref 34–104)
ALT SERPL W P-5'-P-CCNC: 13 U/L (ref 7–52)
ANION GAP SERPL CALCULATED.3IONS-SCNC: 7 MMOL/L
AST SERPL W P-5'-P-CCNC: 12 U/L (ref 13–39)
BASOPHILS # BLD AUTO: 0.03 THOUSANDS/ÂΜL (ref 0–0.1)
BASOPHILS NFR BLD AUTO: 1 % (ref 0–1)
BILIRUB SERPL-MCNC: 0.69 MG/DL (ref 0.2–1)
BUN SERPL-MCNC: 18 MG/DL (ref 5–25)
CALCIUM SERPL-MCNC: 9.5 MG/DL (ref 8.4–10.2)
CHLORIDE SERPL-SCNC: 106 MMOL/L (ref 96–108)
CHOLEST SERPL-MCNC: 189 MG/DL
CO2 SERPL-SCNC: 28 MMOL/L (ref 21–32)
CREAT SERPL-MCNC: 0.8 MG/DL (ref 0.6–1.3)
EOSINOPHIL # BLD AUTO: 0.17 THOUSAND/ÂΜL (ref 0–0.61)
EOSINOPHIL NFR BLD AUTO: 3 % (ref 0–6)
ERYTHROCYTE [DISTWIDTH] IN BLOOD BY AUTOMATED COUNT: 12.7 % (ref 11.6–15.1)
GFR SERPL CREATININE-BSD FRML MDRD: 73 ML/MIN/1.73SQ M
GLUCOSE P FAST SERPL-MCNC: 92 MG/DL (ref 65–99)
HCT VFR BLD AUTO: 44.5 % (ref 34.8–46.1)
HDLC SERPL-MCNC: 67 MG/DL
HGB BLD-MCNC: 14.9 G/DL (ref 11.5–15.4)
IMM GRANULOCYTES # BLD AUTO: 0.02 THOUSAND/UL (ref 0–0.2)
IMM GRANULOCYTES NFR BLD AUTO: 0 % (ref 0–2)
LDLC SERPL CALC-MCNC: 104 MG/DL (ref 0–100)
LYMPHOCYTES # BLD AUTO: 1.61 THOUSANDS/ÂΜL (ref 0.6–4.47)
LYMPHOCYTES NFR BLD AUTO: 31 % (ref 14–44)
MCH RBC QN AUTO: 31.9 PG (ref 26.8–34.3)
MCHC RBC AUTO-ENTMCNC: 33.5 G/DL (ref 31.4–37.4)
MCV RBC AUTO: 95 FL (ref 82–98)
MONOCYTES # BLD AUTO: 0.54 THOUSAND/ÂΜL (ref 0.17–1.22)
MONOCYTES NFR BLD AUTO: 10 % (ref 4–12)
NEUTROPHILS # BLD AUTO: 2.85 THOUSANDS/ÂΜL (ref 1.85–7.62)
NEUTS SEG NFR BLD AUTO: 55 % (ref 43–75)
NONHDLC SERPL-MCNC: 122 MG/DL
NRBC BLD AUTO-RTO: 0 /100 WBCS
PLATELET # BLD AUTO: 256 THOUSANDS/UL (ref 149–390)
PMV BLD AUTO: 8.9 FL (ref 8.9–12.7)
POTASSIUM SERPL-SCNC: 3.8 MMOL/L (ref 3.5–5.3)
PROT SERPL-MCNC: 7 G/DL (ref 6.4–8.4)
RBC # BLD AUTO: 4.67 MILLION/UL (ref 3.81–5.12)
SODIUM SERPL-SCNC: 141 MMOL/L (ref 135–147)
TRIGL SERPL-MCNC: 92 MG/DL
TSH SERPL DL<=0.05 MIU/L-ACNC: 3.72 UIU/ML (ref 0.45–4.5)
WBC # BLD AUTO: 5.22 THOUSAND/UL (ref 4.31–10.16)

## 2024-01-24 PROCEDURE — 36415 COLL VENOUS BLD VENIPUNCTURE: CPT

## 2024-01-24 PROCEDURE — 85025 COMPLETE CBC W/AUTO DIFF WBC: CPT

## 2024-01-24 PROCEDURE — 80061 LIPID PANEL: CPT

## 2024-01-24 PROCEDURE — 80053 COMPREHEN METABOLIC PANEL: CPT

## 2024-01-24 PROCEDURE — 84443 ASSAY THYROID STIM HORMONE: CPT

## 2024-01-25 ENCOUNTER — APPOINTMENT (OUTPATIENT)
Dept: RADIOLOGY | Facility: CLINIC | Age: 74
End: 2024-01-25
Payer: MEDICARE

## 2024-01-25 ENCOUNTER — OFFICE VISIT (OUTPATIENT)
Dept: FAMILY MEDICINE CLINIC | Facility: CLINIC | Age: 74
End: 2024-01-25
Payer: MEDICARE

## 2024-01-25 VITALS
OXYGEN SATURATION: 98 % | WEIGHT: 189 LBS | DIASTOLIC BLOOD PRESSURE: 78 MMHG | SYSTOLIC BLOOD PRESSURE: 130 MMHG | HEIGHT: 64 IN | HEART RATE: 76 BPM | RESPIRATION RATE: 16 BRPM | BODY MASS INDEX: 32.27 KG/M2

## 2024-01-25 DIAGNOSIS — E03.9 HYPOTHYROIDISM, UNSPECIFIED TYPE: ICD-10-CM

## 2024-01-25 DIAGNOSIS — I10 BENIGN ESSENTIAL HYPERTENSION: ICD-10-CM

## 2024-01-25 DIAGNOSIS — I10 ESSENTIAL HYPERTENSION: ICD-10-CM

## 2024-01-25 DIAGNOSIS — M79.644 PAIN OF RIGHT THUMB: ICD-10-CM

## 2024-01-25 DIAGNOSIS — Z12.31 ENCOUNTER FOR SCREENING MAMMOGRAM FOR BREAST CANCER: ICD-10-CM

## 2024-01-25 DIAGNOSIS — E78.5 HYPERLIPIDEMIA, UNSPECIFIED HYPERLIPIDEMIA TYPE: ICD-10-CM

## 2024-01-25 DIAGNOSIS — M79.644 PAIN OF RIGHT THUMB: Primary | ICD-10-CM

## 2024-01-25 PROBLEM — R09.81 NASAL CONGESTION: Status: RESOLVED | Noted: 2023-10-11 | Resolved: 2024-01-25

## 2024-01-25 PROCEDURE — 99214 OFFICE O/P EST MOD 30 MIN: CPT | Performed by: FAMILY MEDICINE

## 2024-01-25 PROCEDURE — 73130 X-RAY EXAM OF HAND: CPT

## 2024-01-25 RX ORDER — LEVOTHYROXINE SODIUM 0.03 MG/1
25 TABLET ORAL DAILY
Qty: 90 TABLET | Refills: 3 | Status: SHIPPED | OUTPATIENT
Start: 2024-01-25

## 2024-01-25 RX ORDER — LOSARTAN POTASSIUM 25 MG/1
25 TABLET ORAL DAILY
Qty: 90 TABLET | Refills: 3 | Status: SHIPPED | OUTPATIENT
Start: 2024-01-25

## 2024-01-25 RX ORDER — HYDROCHLOROTHIAZIDE 25 MG/1
25 TABLET ORAL DAILY
Qty: 90 TABLET | Refills: 3 | Status: SHIPPED | OUTPATIENT
Start: 2024-01-25

## 2024-01-25 RX ORDER — SIMVASTATIN 40 MG
40 TABLET ORAL
Qty: 90 TABLET | Refills: 3 | Status: SHIPPED | OUTPATIENT
Start: 2024-01-25

## 2024-01-25 NOTE — ASSESSMENT & PLAN NOTE
Seems like arthritis, the Spurs palpated along the base of the right thumb and the proximal part of the, she will get the x-ray and discussed about seeing orthopedic with increasing and she might be benefiting with the injection locally in the joint to improve the mobility of the hand,  She may take Tylenol and Voltaren gel topical application might be helpful

## 2024-01-25 NOTE — RESULT ENCOUNTER NOTE
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
Verbal communication

## 2024-01-25 NOTE — ASSESSMENT & PLAN NOTE
Cont simvastatin 40 mg daily, advise low-fat diet, the trend of lipids increasing as her diet was poor in the holidays, she will improve her diet ,

## 2024-01-25 NOTE — PROGRESS NOTES
Name: Shraddha Araiza      : 1950      MRN: [id number]  Encounter Provider: Carina Lafleur MD  Encounter Date: 2024   Encounter department: [facility]    Assessment & Plan     1. Pain of right thumb  Assessment & Plan:  Seems like arthritis, the Spurs palpated along the base of the right thumb and the proximal part of the, she will get the x-ray and discussed about seeing orthopedic with increasing and she might be benefiting with the injection locally in the joint to improve the mobility of the hand,  She may take Tylenol and Voltaren gel topical application might be helpful    Orders:  -     XR hand 3+ vw right; Future; Expected date: 2024    2. Encounter for screening mammogram for breast cancer  -     Mammo screening bilateral w 3d & cad; Future; Expected date: 2024    3. Benign essential hypertension  Assessment & Plan:  Hypertension is stable, continue low-salt diet, continue same medications.      Orders:  -     hydrochlorothiazide (HYDRODIURIL) 25 mg tablet; Take 1 tablet (25 mg total) by mouth daily    4. Essential hypertension  Assessment & Plan:  Hypertension is stable, continue low-salt diet, continue same medications.      Orders:  -     losartan (COZAAR) 25 mg tablet; Take 1 tablet (25 mg total) by mouth daily  -     CBC and differential; Future; Expected date: 2024  -     Comprehensive metabolic panel; Future; Expected date: 2024  -     TSH, 3rd generation; Future; Expected date: 2024  -     Lipid panel; Future; Expected date: 2024    5. Hyperlipidemia, unspecified hyperlipidemia type  Assessment & Plan:  Cont simvastatin 40 mg daily, advise low-fat diet, the trend of lipids increasing as her diet was poor in the holidays, she will improve her diet ,    Orders:  -     simvastatin (ZOCOR) 40 mg tablet; Take 1 tablet (40 mg total) by mouth daily at bedtime  -     CBC and differential; Future; Expected date: 2024  -      Comprehensive metabolic panel; Future; Expected date: 07/25/2024  -     TSH, 3rd generation; Future; Expected date: 07/25/2024  -     Lipid panel; Future; Expected date: 07/25/2024    6. Hypothyroidism, unspecified type  Assessment & Plan:  TSH level, continue same dose of paroxetine 25 mcg daily    Orders:  -     levothyroxine 25 mcg tablet; Take 1 tablet (25 mcg total) by mouth daily        Depression Screening and Follow-up Plan: Patient was screened for depression during today's encounter. They screened negative with a PHQ-2 score of 0.    Refused flu, pneumonia and shingles vaccine     Subjective     She is here for follow-up, hypothyroid, on levothyroxine, hypertension taking all medications, denies any chest pain or shortness of breath, feels intermittent worsening pain at the base of the right thumb on rotation and the movements, and she feels that joint area is more prominent and she also has dry skin of the hands      Review of Systems   Constitutional:  Negative for activity change, appetite change, chills, fatigue, fever and unexpected weight change.   HENT:  Negative for congestion, ear discharge, ear pain, nosebleeds, postnasal drip, rhinorrhea, sinus pressure, sneezing, sore throat, trouble swallowing and voice change.    Eyes:  Negative for photophobia, pain, discharge, redness and itching.   Respiratory:  Negative for cough, chest tightness, shortness of breath and wheezing.    Cardiovascular:  Negative for chest pain, palpitations and leg swelling.   Gastrointestinal:  Negative for abdominal pain, constipation, diarrhea, nausea and vomiting.   Endocrine: Negative for polyuria.   Genitourinary:  Negative for dysuria, frequency and urgency.   Musculoskeletal:  Positive for arthralgias. Negative for back pain, myalgias and neck pain.        Concerns about swelling and pain in the base of the right thumb time intermittent increase   Skin:  Negative for color change, pallor and rash.    Allergic/Immunologic: Negative for environmental allergies and food allergies.   Neurological:  Negative for dizziness, weakness, light-headedness and headaches.   Hematological:  Negative for adenopathy. Does not bruise/bleed easily.   Psychiatric/Behavioral:  Negative for behavioral problems. The patient is not nervous/anxious.        Past Medical History:   Diagnosis Date   • Disease of thyroid gland    • Hyperlipidemia      Past Surgical History:   Procedure Laterality Date   • BREAST BIOPSY Right 04/26/2012   • COLONOSCOPY N/A 3/20/2019    Procedure: COLONOSCOPY;  Surgeon: Chris Mcfadden MD;  [facility];  Service: Gastroenterology   • TONSILLECTOMY       Family History   Problem Relation Age of Onset   • No Known Problems Sister    • No Known Problems Daughter    • Bone cancer Maternal Grandmother    • Brain cancer Maternal Aunt    • No Known Problems Maternal Uncle    • No Known Problems Daughter      Social History     Socioeconomic History   • Marital status: /Civil Union     Spouse name: None   • Number of children: None   • Years of education: None   • Highest education level: None   Occupational History   • None   Tobacco Use   • Smoking status: Never   • Smokeless tobacco: Never   Vaping Use   • Vaping status: Never Used   Substance and Sexual Activity   • Alcohol use: Yes   • Drug use: Never   • Sexual activity: None   Other Topics Concern   • None   Social History Narrative   • None     Social Determinants of Health     Financial Resource Strain: Low Risk  (7/17/2023)    Overall Financial Resource Strain (CARDIA)    • Difficulty of Paying Living Expenses: Not very hard   Food Insecurity: Not on file   Transportation Needs: No Transportation Needs (7/17/2023)    PRAPARE - Transportation    • Lack of Transportation (Medical): No    • Lack of Transportation (Non-Medical): No   Physical Activity: Not on file   Stress: Not on file   Social Connections: Not on file   Intimate Partner  "Violence: Not on file   Housing Stability: Not on file     Current Outpatient Medications on File Prior to Visit   Medication Sig   • [DISCONTINUED] fluticasone (FLONASE) 50 mcg/act nasal spray SPRAY 2 SPRAYS INTO EACH NOSTRIL EVERY DAY (Patient not taking: Reported on 10/11/2023)   • [DISCONTINUED] hydrochlorothiazide (HYDRODIURIL) 25 mg tablet TAKE 1 TABLET BY MOUTH EVERY DAY   • [DISCONTINUED] levothyroxine 25 mcg tablet Take 1 tablet (25 mcg total) by mouth daily   • [DISCONTINUED] losartan (COZAAR) 25 mg tablet TAKE 1 TABLET BY MOUTH EVERY DAY   • [DISCONTINUED] simvastatin (ZOCOR) 40 mg tablet Take 1 tablet (40 mg total) by mouth daily at bedtime     Allergies   Allergen Reactions   • Nickel Rash     Immunization History   Administered Date(s) Administered   • COVID-19 PFIZER VACCINE 0.3 ML IM 03/09/2021, 03/29/2021   • COVID-19 Pfizer Vac BIVALENT Yoan-sucrose 12 Yr+ IM 12/07/2022   • INFLUENZA 09/09/2015   • Influenza Split High Dose Preservative Free IM 09/09/2015       Objective     /78   Pulse 76   Resp 16   Ht 5' 4\" (1.626 m)   Wt 85.7 kg (189 lb)   SpO2 98%   BMI 32.44 kg/m²     Physical Exam  Vitals and nursing note reviewed.   Constitutional:       Appearance: Normal appearance. She is well-developed. She is not ill-appearing.   HENT:      Head: Normocephalic and atraumatic.      Mouth/Throat:      Mouth: Mucous membranes are moist.   Eyes:      Extraocular Movements: Extraocular movements intact.   Neck:      Thyroid: No thyromegaly.   Cardiovascular:      Rate and Rhythm: Normal rate and regular rhythm.      Heart sounds: Normal heart sounds. No murmur heard.  Pulmonary:      Effort: Pulmonary effort is normal.      Breath sounds: Normal breath sounds. No wheezing or rales.   Musculoskeletal:      Cervical back: Normal range of motion and neck supple.      Right lower leg: No edema.      Left lower leg: No edema.      Comments: Pain with rotational movement of the right thumb and the " base of the thumb and the proximal part of the hand on the lateral part is enlargement of the bones, spur  feeling   Skin:     Findings: No erythema or rash.   Neurological:      General: No focal deficit present.      Mental Status: She is alert.   Psychiatric:         Mood and Affect: Mood normal.       Carina Lafleur MD

## 2024-01-26 ENCOUNTER — TELEPHONE (OUTPATIENT)
Dept: OTHER | Facility: OTHER | Age: 74
End: 2024-01-26

## 2024-01-26 ENCOUNTER — TELEPHONE (OUTPATIENT)
Dept: FAMILY MEDICINE CLINIC | Facility: CLINIC | Age: 74
End: 2024-01-26

## 2024-01-26 NOTE — TELEPHONE ENCOUNTER
----- Message from Carina Lafleur MD sent at 1/25/2024  5:13 PM EST -----  Please inform the patient she has severe arthritis in the hand, if she feel any worsening pain she should see the orthopedic

## 2024-01-26 NOTE — TELEPHONE ENCOUNTER
Triage RN confirmed with the patient per Dr. Lafleur, Voltaren gel topical OTC and Tylenol is advised for arthritis.

## 2024-02-21 PROBLEM — Z13.6 SCREENING FOR CARDIOVASCULAR CONDITION: Status: RESOLVED | Noted: 2019-02-04 | Resolved: 2024-02-21

## 2024-05-14 ENCOUNTER — HOSPITAL ENCOUNTER (OUTPATIENT)
Dept: RADIOLOGY | Facility: HOSPITAL | Age: 74
Discharge: HOME | End: 2024-05-14
Attending: FAMILY MEDICINE
Payer: MEDICARE

## 2024-05-14 VITALS — BODY MASS INDEX: 31.92 KG/M2 | WEIGHT: 187 LBS | HEIGHT: 64 IN

## 2024-05-14 DIAGNOSIS — Z12.31 ENCOUNTER FOR SCREENING MAMMOGRAM FOR BREAST CANCER: ICD-10-CM

## 2024-05-14 PROCEDURE — 77063 BREAST TOMOSYNTHESIS BI: CPT

## 2024-05-14 PROCEDURE — 77067 SCR MAMMO BI INCL CAD: CPT

## 2024-05-16 ENCOUNTER — TELEPHONE (OUTPATIENT)
Dept: FAMILY MEDICINE CLINIC | Facility: CLINIC | Age: 74
End: 2024-05-16

## 2024-05-16 NOTE — TELEPHONE ENCOUNTER
----- Message from Carina Lafleur MD sent at 5/16/2024  9:05 AM EDT -----  Normal mammogram, please inform the patient

## 2024-08-05 ENCOUNTER — APPOINTMENT (OUTPATIENT)
Dept: LAB | Facility: CLINIC | Age: 74
End: 2024-08-05
Payer: MEDICARE

## 2024-08-05 ENCOUNTER — RA CDI HCC (OUTPATIENT)
Dept: OTHER | Facility: HOSPITAL | Age: 74
End: 2024-08-05

## 2024-08-05 DIAGNOSIS — E78.5 HYPERLIPIDEMIA, UNSPECIFIED HYPERLIPIDEMIA TYPE: ICD-10-CM

## 2024-08-05 DIAGNOSIS — I10 ESSENTIAL HYPERTENSION: ICD-10-CM

## 2024-08-05 LAB
ALBUMIN SERPL BCG-MCNC: 4.1 G/DL (ref 3.5–5)
ALP SERPL-CCNC: 42 U/L (ref 34–104)
ALT SERPL W P-5'-P-CCNC: 14 U/L (ref 7–52)
ANION GAP SERPL CALCULATED.3IONS-SCNC: 12 MMOL/L (ref 4–13)
AST SERPL W P-5'-P-CCNC: 14 U/L (ref 13–39)
BASOPHILS # BLD AUTO: 0.03 THOUSANDS/ÂΜL (ref 0–0.1)
BASOPHILS NFR BLD AUTO: 1 % (ref 0–1)
BILIRUB SERPL-MCNC: 0.69 MG/DL (ref 0.2–1)
BUN SERPL-MCNC: 19 MG/DL (ref 5–25)
CALCIUM SERPL-MCNC: 9.5 MG/DL (ref 8.4–10.2)
CHLORIDE SERPL-SCNC: 106 MMOL/L (ref 96–108)
CHOLEST SERPL-MCNC: 187 MG/DL
CO2 SERPL-SCNC: 25 MMOL/L (ref 21–32)
CREAT SERPL-MCNC: 0.68 MG/DL (ref 0.6–1.3)
EOSINOPHIL # BLD AUTO: 0.19 THOUSAND/ÂΜL (ref 0–0.61)
EOSINOPHIL NFR BLD AUTO: 4 % (ref 0–6)
ERYTHROCYTE [DISTWIDTH] IN BLOOD BY AUTOMATED COUNT: 12.9 % (ref 11.6–15.1)
GFR SERPL CREATININE-BSD FRML MDRD: 87 ML/MIN/1.73SQ M
GLUCOSE P FAST SERPL-MCNC: 84 MG/DL (ref 65–99)
HCT VFR BLD AUTO: 42.1 % (ref 34.8–46.1)
HDLC SERPL-MCNC: 57 MG/DL
HGB BLD-MCNC: 14.2 G/DL (ref 11.5–15.4)
IMM GRANULOCYTES # BLD AUTO: 0.01 THOUSAND/UL (ref 0–0.2)
IMM GRANULOCYTES NFR BLD AUTO: 0 % (ref 0–2)
LDLC SERPL CALC-MCNC: 98 MG/DL (ref 0–100)
LYMPHOCYTES # BLD AUTO: 1.81 THOUSANDS/ÂΜL (ref 0.6–4.47)
LYMPHOCYTES NFR BLD AUTO: 35 % (ref 14–44)
MCH RBC QN AUTO: 32.5 PG (ref 26.8–34.3)
MCHC RBC AUTO-ENTMCNC: 33.7 G/DL (ref 31.4–37.4)
MCV RBC AUTO: 96 FL (ref 82–98)
MONOCYTES # BLD AUTO: 0.58 THOUSAND/ÂΜL (ref 0.17–1.22)
MONOCYTES NFR BLD AUTO: 11 % (ref 4–12)
NEUTROPHILS # BLD AUTO: 2.59 THOUSANDS/ÂΜL (ref 1.85–7.62)
NEUTS SEG NFR BLD AUTO: 49 % (ref 43–75)
NONHDLC SERPL-MCNC: 130 MG/DL
NRBC BLD AUTO-RTO: 0 /100 WBCS
PLATELET # BLD AUTO: 254 THOUSANDS/UL (ref 149–390)
PMV BLD AUTO: 9.8 FL (ref 8.9–12.7)
POTASSIUM SERPL-SCNC: 3.6 MMOL/L (ref 3.5–5.3)
PROT SERPL-MCNC: 6.6 G/DL (ref 6.4–8.4)
RBC # BLD AUTO: 4.37 MILLION/UL (ref 3.81–5.12)
SODIUM SERPL-SCNC: 143 MMOL/L (ref 135–147)
TRIGL SERPL-MCNC: 160 MG/DL
TSH SERPL DL<=0.05 MIU/L-ACNC: 2.92 UIU/ML (ref 0.45–4.5)
WBC # BLD AUTO: 5.21 THOUSAND/UL (ref 4.31–10.16)

## 2024-08-05 PROCEDURE — 80061 LIPID PANEL: CPT

## 2024-08-05 PROCEDURE — 36415 COLL VENOUS BLD VENIPUNCTURE: CPT

## 2024-08-05 PROCEDURE — 80053 COMPREHEN METABOLIC PANEL: CPT

## 2024-08-05 PROCEDURE — 84443 ASSAY THYROID STIM HORMONE: CPT

## 2024-08-05 PROCEDURE — 85025 COMPLETE CBC W/AUTO DIFF WBC: CPT

## 2024-08-09 ENCOUNTER — OFFICE VISIT (OUTPATIENT)
Dept: FAMILY MEDICINE CLINIC | Facility: CLINIC | Age: 74
End: 2024-08-09
Payer: MEDICARE

## 2024-08-09 VITALS
BODY MASS INDEX: 31.92 KG/M2 | DIASTOLIC BLOOD PRESSURE: 78 MMHG | SYSTOLIC BLOOD PRESSURE: 122 MMHG | OXYGEN SATURATION: 97 % | HEIGHT: 64 IN | HEART RATE: 90 BPM | WEIGHT: 187 LBS

## 2024-08-09 DIAGNOSIS — E03.9 ACQUIRED HYPOTHYROIDISM: ICD-10-CM

## 2024-08-09 DIAGNOSIS — Z91.81 HISTORY OF RECENT FALL: ICD-10-CM

## 2024-08-09 DIAGNOSIS — E78.5 HYPERLIPIDEMIA, UNSPECIFIED HYPERLIPIDEMIA TYPE: ICD-10-CM

## 2024-08-09 DIAGNOSIS — R06.02 SOB (SHORTNESS OF BREATH) ON EXERTION: Primary | ICD-10-CM

## 2024-08-09 DIAGNOSIS — Z00.00 MEDICARE ANNUAL WELLNESS VISIT, SUBSEQUENT: ICD-10-CM

## 2024-08-09 DIAGNOSIS — I10 BENIGN ESSENTIAL HYPERTENSION: ICD-10-CM

## 2024-08-09 PROCEDURE — 99214 OFFICE O/P EST MOD 30 MIN: CPT | Performed by: FAMILY MEDICINE

## 2024-08-09 PROCEDURE — G0439 PPPS, SUBSEQ VISIT: HCPCS | Performed by: FAMILY MEDICINE

## 2024-08-09 NOTE — PROGRESS NOTES
Ambulatory Visit  Name: Shraddha Araiza      : 1950      MRN: [id number]  Encounter Provider: Carina Lafleur MD  Encounter Date: 2024   Encounter department: [facility]    Assessment & Plan   1. SOB (shortness of breath) on exertion  Assessment & Plan:  New onset of symptoms of slight chest tightness and shortness of breath on exertion, advised to get evaluation by cardiology and pulmonologist, her blood pressure is stable, continue same medication  Orders:  -     Ambulatory Referral to Cardiology; Future  -     Ambulatory Referral to Pulmonology; Future  2. Benign essential hypertension  Assessment & Plan:  Hypertension is stable, continue low-salt diet, continue same medications.    Orders:  -     CBC and differential; Future; Expected date: 2025  -     Comprehensive metabolic panel; Future; Expected date: 2025  -     Lipid panel; Future; Expected date: 2025  -     TSH, 3rd generation; Future; Expected date: 2025  3. Acquired hypothyroidism  Assessment & Plan:  TSH is normal, continue same dose of levothyroxine  Orders:  -     TSH, 3rd generation; Future; Expected date: 2025  4. Hyperlipidemia, unspecified hyperlipidemia type  -     CBC and differential; Future; Expected date: 2025  -     Comprehensive metabolic panel; Future; Expected date: 2025  -     Lipid panel; Future; Expected date: 2025  -     TSH, 3rd generation; Future; Expected date: 2025  5. History of recent fall  Assessment & Plan:  On  this year while she was walking and she fell on her knee, she says she has slight bleed from the nose but no loss of consciousness and she still has some discomfort in her right knee but there is no problem in walking, she did not go to ER overall she felt fine and she does not have any balance problem  6. Medicare annual wellness visit, subsequent  Assessment & Plan:  She refused for Cologuard or colon cancer screening,  and she does not want vaccination       Preventive health issues were discussed with patient, and age appropriate screening tests were ordered as noted in patient's After Visit Summary. Personalized health advice and appropriate referrals for health education or preventive services given if needed, as noted in patient's After Visit Summary.    History of Present Illness     AWV, and follow up , she was walking in summer in June and suddenly she fell and does not know what happened she had slight injury to the right knee and slight blood from the nose, she did not go to the ER and gradually healed still feels slight discomfort in right knee but no problem and going stairs and walking she has no balance problem, no loss of consciousness at that time she was able to stand up by herself with little assistance from another person.  She says since summer as she was on vacation she started noticing that on doing stairs or walking she feels a little chest tightness and feeling of shortness of breath like she cannot breathe and then after rest she feels better, she has history of asthma when she was young, she is not sure if it there is something coming back or something else going on, today she does not have any symptoms it only happens during mild exertion       Patient Care Team:  Carina Lafleur MD as PCP - General  MD Chris Vang MD as Endoscopist    Review of Systems   Constitutional:  Negative for activity change, appetite change, chills, fatigue, fever and unexpected weight change.   HENT:  Negative for congestion, ear discharge, ear pain, nosebleeds, postnasal drip, rhinorrhea, sinus pressure, sneezing, sore throat, trouble swallowing and voice change.    Eyes:  Negative for photophobia, pain, discharge, redness and itching.   Respiratory:  Positive for chest tightness and shortness of breath. Negative for cough and wheezing.    Cardiovascular:  Negative for chest pain, palpitations and leg  swelling.   Gastrointestinal:  Negative for abdominal pain, constipation, diarrhea, nausea and vomiting.   Endocrine: Negative for polyuria.   Genitourinary:  Negative for dysuria, frequency and urgency.   Musculoskeletal:  Negative for arthralgias, back pain, myalgias and neck pain.   Skin:  Negative for color change, pallor and rash.   Allergic/Immunologic: Negative for environmental allergies and food allergies.   Neurological:  Negative for dizziness, weakness, light-headedness and headaches.   Hematological:  Negative for adenopathy. Does not bruise/bleed easily.   Psychiatric/Behavioral:  Negative for behavioral problems. The patient is not nervous/anxious.      Medical History Reviewed by provider this encounter:  Tobacco  Allergies  Meds  Problems  Med Hx  Surg Hx  Fam Hx       Annual Wellness Visit Questionnaire   Shraddha is here for her Subsequent Wellness visit. Last Medicare Wellness visit information reviewed, patient interviewed, no change since last AWV.     Health Risk Assessment:   Patient rates overall health as good. Patient feels that their physical health rating is same. Patient is satisfied with their life. Eyesight was rated as same. Hearing was rated as same. Patient feels that their emotional and mental health rating is same. Patients states they are never, rarely angry. Patient states they are sometimes unusually tired/fatigued. Pain experienced in the last 7 days has been none. Patient states that she has experienced no weight loss or gain in last 6 months.     Depression Screening:   PHQ-2 Score: 0      Fall Risk Screening:   In the past year, patient has experienced: no history of falling in past year      Urinary Incontinence Screening:   Patient has not leaked urine accidently in the last six months.     Home Safety:  Patient does not have trouble with stairs inside or outside of their home. Patient has working smoke alarms and has working carbon monoxide detector. Home safety  hazards include: none.     Nutrition:   Current diet is Low Cholesterol.     Medications:   Patient is not currently taking any over-the-counter supplements. Patient is able to manage medications.     Activities of Daily Living (ADLs)/Instrumental Activities of Daily Living (IADLs):   Walk and transfer into and out of bed and chair?: Yes  Dress and groom yourself?: Yes    Bathe or shower yourself?: Yes    Feed yourself? Yes  Do your laundry/housekeeping?: Yes  Manage your money, pay your bills and track your expenses?: Yes  Make your own meals?: Yes    Do your own shopping?: Yes    Previous Hospitalizations:   Any hospitalizations or ED visits within the last 12 months?: No      Advance Care Planning:   Living will: No    Durable POA for healthcare: No    Advanced directive: No      Cognitive Screening:   Provider or family/friend/caregiver concerned regarding cognition?: No    PREVENTIVE SCREENINGS      Cardiovascular Screening:    General: Screening Not Indicated and History Lipid Disorder      Diabetes Screening:     General: Screening Current      Colorectal Cancer Screening:     General: Screening Current      Breast Cancer Screening:     General: Screening Current and Risks and Benefits Discussed    Due for: Mammogram        Cervical Cancer Screening:    General: Screening Not Indicated      Osteoporosis Screening:    General: Risks and Benefits Discussed and Patient Declines      Abdominal Aortic Aneurysm (AAA) Screening:        General: Screening Not Indicated      Lung Cancer Screening:     General: Screening Not Indicated      Hepatitis C Screening:    General: Screening Current    Screening, Brief Intervention, and Referral to Treatment (SBIRT)    Screening  Typical number of drinks in a day: 1  Typical number of drinks in a week: 7  Interpretation: Low risk drinking behavior.    AUDIT-C Screenin) How often did you have a drink containing alcohol in the past year? 4 or more times a week  2) How many  drinks did you have on a typical day when you were drinking in the past year? 3 to 4  3) How often did you have 6 or more drinks on one occasion in the past year? never    AUDIT-C Score: 4  Interpretation: Score 3-12 (female): POSITIVE screen for alcohol misuse    AUDIT Screenin) How often during the last year have you found that you were not able to stop drinking once you had started? 0 - never  5) How often during the last year have you failed to do what was normally expected from you because of drinking? 0 - never  6) How often during the last year have you needed a first drink in the morning to get yourself going after a heavy drinking session? 0 - never  7) How often during the last year have you had a feeling of guilt or remorse after drinking? 0 - never  8) How often during the last year have you been unable to remember what happened the night before because you had been drinking? 0 - never  9) Have you or someone else been injured as a result of your drinking? 0 - no  10) Has a relative or friend or a doctor or another health worker been concerned about your drinking or suggested you cut down? 0 - no    AUDIT Score: 4  Interpretation: Low risk alcohol consumption    Single Item Drug Screening:  How often have you used an illegal drug (including marijuana) or a prescription medication for non-medical reasons in the past year? never    Single Item Drug Screen Score: 0  Interpretation: Negative screen for possible drug use disorder    Other Counseling Topics:   Regular weightbearing exercise and calcium and vitamin D intake.     Social Determinants of Health     Financial Resource Strain: Low Risk  (2023)    Overall Financial Resource Strain (CARDIA)    • Difficulty of Paying Living Expenses: Not very hard   Food Insecurity: No Food Insecurity (2024)    Hunger Vital Sign    • Worried About Running Out of Food in the Last Year: Never true    • Ran Out of Food in the Last Year: Never true  "  Transportation Needs: No Transportation Needs (8/9/2024)    PRAPARE - Transportation    • Lack of Transportation (Medical): No    • Lack of Transportation (Non-Medical): No   Housing Stability: Low Risk  (8/9/2024)    Housing Stability Vital Sign    • Unable to Pay for Housing in the Last Year: No    • Number of Times Moved in the Last Year: 1    • Homeless in the Last Year: No   Utilities: Not At Risk (8/9/2024)    [facility] Utilities    • Threatened with loss of utilities: No     No results found.    Objective     /78   Pulse 90   Ht 5' 4\" (1.626 m)   Wt 84.8 kg (187 lb)   SpO2 97%   BMI 32.10 kg/m²     Physical Exam  Vitals and nursing note reviewed.   Constitutional:       General: She is not in acute distress.     Appearance: Normal appearance. She is not ill-appearing.   HENT:      Head: Normocephalic and atraumatic.      Right Ear: Tympanic membrane and ear canal normal. There is no impacted cerumen.      Left Ear: Tympanic membrane and ear canal normal. There is no impacted cerumen.      Nose: Nose normal. No congestion or rhinorrhea.      Mouth/Throat:      Mouth: Mucous membranes are moist.      Pharynx: Oropharynx is clear. No oropharyngeal exudate or posterior oropharyngeal erythema.   Eyes:      General: No scleral icterus.        Right eye: No discharge.         Left eye: No discharge.      Extraocular Movements: Extraocular movements intact.      Conjunctiva/sclera: Conjunctivae normal.      Pupils: Pupils are equal, round, and reactive to light.   Neck:      Vascular: No carotid bruit.   Cardiovascular:      Rate and Rhythm: Normal rate and regular rhythm.      Heart sounds: Normal heart sounds. No murmur heard.  Pulmonary:      Effort: Pulmonary effort is normal.      Breath sounds: Normal breath sounds. No wheezing or rales.   Abdominal:      General: Abdomen is flat. There is no distension.      Palpations: Abdomen is soft. There is no mass.      Tenderness: There is no abdominal " tenderness.   Musculoskeletal:         General: No swelling, tenderness or deformity. Normal range of motion.      Cervical back: Normal range of motion and neck supple. No muscular tenderness.      Right lower leg: No edema.      Left lower leg: No edema.      Comments: Knee has full range of motion    Lymphadenopathy:      Cervical: No cervical adenopathy.   Skin:     Coloration: Skin is not jaundiced or pale.      Findings: No bruising, erythema, lesion or rash.   Neurological:      General: No focal deficit present.      Mental Status: She is alert and oriented to person, place, and time.      Cranial Nerves: No cranial nerve deficit.      Motor: No weakness.      Coordination: Coordination normal.      Gait: Gait normal.   Psychiatric:         Mood and Affect: Mood normal.         Behavior: Behavior normal.

## 2024-08-09 NOTE — ASSESSMENT & PLAN NOTE
On June 30 this year while she was walking and she fell on her knee, she says she has slight bleed from the nose but no loss of consciousness and she still has some discomfort in her right knee but there is no problem in walking, she did not go to ER overall she felt fine and she does not have any balance problem

## 2024-08-09 NOTE — ASSESSMENT & PLAN NOTE
New onset of symptoms of slight chest tightness and shortness of breath on exertion, advised to get evaluation by cardiology and pulmonologist, her blood pressure is stable, continue same medication

## 2024-08-09 NOTE — PATIENT INSTRUCTIONS
Medicare Preventive Visit Patient Instructions  Thank you for completing your Welcome to Medicare Visit or Medicare Annual Wellness Visit today. Your next wellness visit will be due in one year (8/10/2025).  The screening/preventive services that you may require over the next 5-10 years are detailed below. Some tests may not apply to you based off risk factors and/or age. Screening tests ordered at today's visit but not completed yet may show as past due. Also, please note that scanned in results may not display below.  Preventive Screenings:  Service Recommendations Previous Testing/Comments   Colorectal Cancer Screening  * Colonoscopy    * Fecal Occult Blood Test (FOBT)/Fecal Immunochemical Test (FIT)  * Fecal DNA/Cologuard Test  * Flexible Sigmoidoscopy Age: 45-75 years old   Colonoscopy: every 10 years (may be performed more frequently if at higher risk)  OR  FOBT/FIT: every 1 year  OR  Cologuard: every 3 years  OR  Sigmoidoscopy: every 5 years  Screening may be recommended earlier than age 45 if at higher risk for colorectal cancer. Also, an individualized decision between you and your healthcare provider will decide whether screening between the ages of 76-85 would be appropriate. Colonoscopy: 03/20/2019  FOBT/FIT: Not on file  Cologuard: Not on file  Sigmoidoscopy: Not on file    Screening Current     Breast Cancer Screening Age: 40+ years old  Frequency: every 1-2 years  Not required if history of left and right mastectomy Mammogram: 05/14/2024    Screening Current  Risks and Benefits Discussed  Due for Mammogram   Cervical Cancer Screening Between the ages of 21-29, pap smear recommended once every 3 years.   Between the ages of 30-65, can perform pap smear with HPV co-testing every 5 years.   Recommendations may differ for women with a history of total hysterectomy, cervical cancer, or abnormal pap smears in past. Pap Smear: Not on file    Screening Not Indicated   Hepatitis C Screening Once for adults  born between 1945 and 1965  More frequently in patients at high risk for Hepatitis C Hep C Antibody: 12/04/2020    Screening Current   Diabetes Screening 1-2 times per year if you're at risk for diabetes or have pre-diabetes Fasting glucose: 84 mg/dL (8/5/2024)  A1C: No results in last 5 years (No results in last 5 years)  Screening Current   Cholesterol Screening Once every 5 years if you don't have a lipid disorder. May order more often based on risk factors. Lipid panel: 08/05/2024    Screening Not Indicated  History Lipid Disorder     Other Preventive Screenings Covered by Medicare:  Abdominal Aortic Aneurysm (AAA) Screening: covered once if your at risk. You're considered to be at risk if you have a family history of AAA.  Lung Cancer Screening: covers low dose CT scan once per year if you meet all of the following conditions: (1) Age 55-77; (2) No signs or symptoms of lung cancer; (3) Current smoker or have quit smoking within the last 15 years; (4) You have a tobacco smoking history of at least 20 pack years (packs per day multiplied by number of years you smoked); (5) You get a written order from a healthcare provider.  Glaucoma Screening: covered annually if you're considered high risk: (1) You have diabetes OR (2) Family history of glaucoma OR (3)  aged 50 and older OR (4)  American aged 65 and older  Osteoporosis Screening: covered every 2 years if you meet one of the following conditions: (1) You're estrogen deficient and at risk for osteoporosis based off medical history and other findings; (2) Have a vertebral abnormality; (3) On glucocorticoid therapy for more than 3 months; (4) Have primary hyperparathyroidism; (5) On osteoporosis medications and need to assess response to drug therapy.   Last bone density test (DXA Scan): 06/23/2021.  HIV Screening: covered annually if you're between the age of 15-65. Also covered annually if you are younger than 15 and older than 65 with  risk factors for HIV infection. For pregnant patients, it is covered up to 3 times per pregnancy.    Immunizations:  Immunization Recommendations   Influenza Vaccine Annual influenza vaccination during flu season is recommended for all persons aged >= 6 months who do not have contraindications   Pneumococcal Vaccine   * Pneumococcal conjugate vaccine = PCV13 (Prevnar 13), PCV15 (Vaxneuvance), PCV20 (Prevnar 20)  * Pneumococcal polysaccharide vaccine = PPSV23 (Pneumovax) Adults 19-63 yo with certain risk factors or if 65+ yo  If never received any pneumonia vaccine: recommend Prevnar 20 (PCV20)  Give PCV20 if previously received 1 dose of PCV13 or PPSV23   Hepatitis B Vaccine 3 dose series if at intermediate or high risk (ex: diabetes, end stage renal disease, liver disease)   Respiratory syncytial virus (RSV) Vaccine - COVERED BY MEDICARE PART D  * RSVPreF3 (Arexvy) CDC recommends that adults 60 years of age and older may receive a single dose of RSV vaccine using shared clinical decision-making (SCDM)   Tetanus (Td) Vaccine - COST NOT COVERED BY MEDICARE PART B Following completion of primary series, a booster dose should be given every 10 years to maintain immunity against tetanus. Td may also be given as tetanus wound prophylaxis.   Tdap Vaccine - COST NOT COVERED BY MEDICARE PART B Recommended at least once for all adults. For pregnant patients, recommended with each pregnancy.   Shingles Vaccine (Shingrix) - COST NOT COVERED BY MEDICARE PART B  2 shot series recommended in those 19 years and older who have or will have weakened immune systems or those 50 years and older     Health Maintenance Due:      Topic Date Due   • Colorectal Cancer Screening  03/20/2024   • Breast Cancer Screening: Mammogram  05/14/2025   • Hepatitis C Screening  Completed     Immunizations Due:      Topic Date Due   • Pneumococcal Vaccine: 65+ Years (1 of 1 - PCV) Never done   • COVID-19 Vaccine (4 - 2023-24 season) 09/01/2023   •  Influenza Vaccine (1) 09/01/2024     Advance Directives   What are advance directives?  Advance directives are legal documents that state your wishes and plans for medical care. These plans are made ahead of time in case you lose your ability to make decisions for yourself. Advance directives can apply to any medical decision, such as the treatments you want, and if you want to donate organs.   What are the types of advance directives?  There are many types of advance directives, and each state has rules about how to use them. You may choose a combination of any of the following:  Living will:  This is a written record of the treatment you want. You can also choose which treatments you do not want, which to limit, and which to stop at a certain time. This includes surgery, medicine, IV fluid, and tube feedings.   Durable power of  for healthcare (DPAHC):  This is a written record that states who you want to make healthcare choices for you when you are unable to make them for yourself. This person, called a proxy, is usually a family member or a friend. You may choose more than 1 proxy.  Do not resuscitate (DNR) order:  A DNR order is used in case your heart stops beating or you stop breathing. It is a request not to have certain forms of treatment, such as CPR. A DNR order may be included in other types of advance directives.  Medical directive:  This covers the care that you want if you are in a coma, near death, or unable to make decisions for yourself. You can list the treatments you want for each condition. Treatment may include pain medicine, surgery, blood transfusions, dialysis, IV or tube feedings, and a ventilator (breathing machine).  Values history:  This document has questions about your views, beliefs, and how you feel and think about life. This information can help others choose the care that you would choose.  Why are advance directives important?  An advance directive helps you control your  care. Although spoken wishes may be used, it is better to have your wishes written down. Spoken wishes can be misunderstood, or not followed. Treatments may be given even if you do not want them. An advance directive may make it easier for your family to make difficult choices about your care.   Weight Management   Why it is important to manage your weight:  Being overweight increases your risk of health conditions such as heart disease, high blood pressure, type 2 diabetes, and certain types of cancer. It can also increase your risk for osteoarthritis, sleep apnea, and other respiratory problems. Aim for a slow, steady weight loss. Even a small amount of weight loss can lower your risk of health problems.  How to lose weight safely:  A safe and healthy way to lose weight is to eat fewer calories and get regular exercise. You can lose up about 1 pound a week by decreasing the number of calories you eat by 500 calories each day.   Healthy meal plan for weight management:  A healthy meal plan includes a variety of foods, contains fewer calories, and helps you stay healthy. A healthy meal plan includes the following:  Eat whole-grain foods more often.  A healthy meal plan should contain fiber. Fiber is the part of grains, fruits, and vegetables that is not broken down by your body. Whole-grain foods are healthy and provide extra fiber in your diet. Some examples of whole-grain foods are whole-wheat breads and pastas, oatmeal, brown rice, and bulgur.  Eat a variety of vegetables every day.  Include dark, leafy greens such as spinach, kale, evelyn greens, and mustard greens. Eat yellow and orange vegetables such as carrots, sweet potatoes, and winter squash.   Eat a variety of fruits every day.  Choose fresh or canned fruit (canned in its own juice or light syrup) instead of juice. Fruit juice has very little or no fiber.  Eat low-fat dairy foods.  Drink fat-free (skim) milk or 1% milk. Eat fat-free yogurt and low-fat  "cottage cheese. Try low-fat cheeses such as mozzarella and other reduced-fat cheeses.  Choose meat and other protein foods that are low in fat.  Choose beans or other legumes such as split peas or lentils. Choose fish, skinless poultry (chicken or turkey), or lean cuts of red meat (beef or pork). Before you cook meat or poultry, cut off any visible fat.   Use less fat and oil.  Try baking foods instead of frying them. Add less fat, such as margarine, sour cream, regular salad dressing and mayonnaise to foods. Eat fewer high-fat foods. Some examples of high-fat foods include french fries, doughnuts, ice cream, and cakes.  Eat fewer sweets.  Limit foods and drinks that are high in sugar. This includes candy, cookies, regular soda, and sweetened drinks.  Exercise:  Exercise at least 30 minutes per day on most days of the week. Some examples of exercise include walking, biking, dancing, and swimming. You can also fit in more physical activity by taking the stairs instead of the elevator or parking farther away from stores. Ask your healthcare provider about the best exercise plan for you.   Alcohol Use and Your Health    Drinking too much can harm your health.  Excessive alcohol use leads to about 88,000 death in the [place] each year, and shortens the life of those who diet by almost 30 years.  Further, excessive drinking cost the economy $249 billion in 2010.  Most excessive drinkers are not alcohol dependent.    Excessive alcohol use has immediate effects that increase the risk of many harmful health conditions.  These are most often the result of binge drinking.  Over time, excessive alcohol use can lead to the development of chronic diseases and other series health problems.    What is considered a \"drink\"?        Excessive alcohol use includes:  Binge Drinking: For women, 4 or more drinks consumed on one occasion. For men, 5 or more drinks consumed on one occasion.  Heavy Drinking: For women, 8 or more " drinks per week. For men, 15 or more drinks per week  Any alcohol used by pregnant women  Any alcohol used by those under the age of 21 years    If you choose to drink, do so in moderation:  Do not drink at all if you are under the age of 21, or if you are or may be pregnant, or have health problems that could be made worse by drinking.  For women, up to 1 drink per day  For men, up to 2 drinks a day    No one should begin drinking or drink more frequently based on potential health benefits    Short-Term Health Risks:  Injuries: motor vehicle crashes, falls, drownings, burns  Violence: homicide, suicide, sexual assault, intimate partner violence  Alcohol poisoning  Reproductive health: risky sexual behaviors, unintended prengnacy, sexually transmitted diseases, miscarriage, stillbirth, fetal alcohol syndrome    Long-Term Health Risks:  Chronic diseases: high blood pressure, heart disease, stroke, liver disease, digestive problems  Cancers: breast, mouth and throat, liver, colon  Learning and memory problems: dementia, poor school performance  Mental health: depression, anxiety, insomnia  Social problems: lost productivity, family problems, unemployment  Alcohol dependence    For support and more information:  Substance Abuse and Mental Health Services Administration  [address]  [place] [address]  Web Address: http://www.samhsa.gov    Alcoholics Anonymous        Web Address: http://www.aa.org    https://www.cdc.gov/alcohol/fact-sheets/alcohol-use.htm     © Copyright [employer] 2018 Information is for End User's use only and may not be sold, redistributed or otherwise used for commercial purposes. All illustrations and images included in CareNotes® are the copyrighted property of [employer] or [employer]

## 2024-08-14 ENCOUNTER — CONSULT (OUTPATIENT)
Dept: PULMONOLOGY | Facility: CLINIC | Age: 74
End: 2024-08-14
Payer: MEDICARE

## 2024-08-14 ENCOUNTER — HOSPITAL ENCOUNTER (OUTPATIENT)
Dept: RADIOLOGY | Facility: HOSPITAL | Age: 74
Discharge: HOME | End: 2024-08-14
Payer: MEDICARE

## 2024-08-14 VITALS
TEMPERATURE: 97.4 F | WEIGHT: 187 LBS | SYSTOLIC BLOOD PRESSURE: 132 MMHG | OXYGEN SATURATION: 96 % | BODY MASS INDEX: 31.92 KG/M2 | HEIGHT: 64 IN | HEART RATE: 80 BPM | DIASTOLIC BLOOD PRESSURE: 84 MMHG

## 2024-08-14 DIAGNOSIS — T70.29XA SHORTNESS OF BREATH ASSOCIATED WITH HIGH ALTITUDE, INITIAL ENCOUNTER: ICD-10-CM

## 2024-08-14 DIAGNOSIS — R06.02 SOB (SHORTNESS OF BREATH) ON EXERTION: ICD-10-CM

## 2024-08-14 DIAGNOSIS — R06.02 SOB (SHORTNESS OF BREATH) ON EXERTION: Primary | ICD-10-CM

## 2024-08-14 PROCEDURE — 71046 X-RAY EXAM CHEST 2 VIEWS: CPT

## 2024-08-14 PROCEDURE — G2211 COMPLEX E/M VISIT ADD ON: HCPCS | Performed by: STUDENT IN AN ORGANIZED HEALTH CARE EDUCATION/TRAINING PROGRAM

## 2024-08-14 PROCEDURE — 99214 OFFICE O/P EST MOD 30 MIN: CPT | Performed by: STUDENT IN AN ORGANIZED HEALTH CARE EDUCATION/TRAINING PROGRAM

## 2024-08-14 NOTE — PROGRESS NOTES
"    Consultation - Pulmonary Medicine   Shraddha Araiza 74 y.o. female MRN: [id number]      Reason for Consult: Dyspnea    Shraddha Araiza is a 74 y.o. female with a PMH of HTN, Hypothyroidism who presents fo revaluation of shortness of breath.    Dyspnea on Exertion - Likely related to deconditioning but she has possible anginal symptoms for which she is see cardiology for. She has significant exposure history and secondhand smoke history. 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 She would benefit from Diamox prior to her next visit to another location with high elevation.       Dru Soliman MD  [facility]    _____________________________________________________________________    HPI:    Sharddha Araiza is a 74 y.o. female with a PMH of HTN, Hypothyroidism who presents fo revaluation of shortness of breath.    Dyspnea with exertion 9,000ft climbing 2 flights  Fatigued at [place] 14,000  Symptoms generally improved now she has returned to [place].  Occasional chest pressure  Denies any wheezing or coughing    Tobacco: Never, Secondhand smoker   Asthma Hx: Denies  Triggers/Allergies: None,  Exercise  Exposure/Work:  [employer] -   Pets: None  CHF Hx: HTN  Pulm Meds: None  ET:  <4 flights          PFT results:  The most recent pulmonary function tests were reviewed.  None    Imaging:  I personally reviewed the images on the PAC system pertinent to today's visit  CXR '14   Unremarkable     Other studies:  None    PhysicalExamination:  Vitals:   /84   Pulse 80   Temp (!) 97.4 °F (36.3 °C)   Ht 5' 4\" (1.626 m)   Wt 84.8 kg (187 lb)   SpO2 96%   BMI 32.10 kg/m²     Appearance -- NAD, speaking full sentences  Neuro -- A&Ox3  Neck -- no JVD  Heart -- RRR, no murmurs  Lungs -- CTAB  Abdomen -- soft, NTND  Extremities -- " WWP, no LE edema  Skin -- no rash    Review of Systems:  Aside from what is mentioned in the HPI, the review of systems otherwise negative.    Immunization History   Administered Date(s) Administered    COVID-19 PFIZER VACCINE 0.3 ML IM 03/09/2021, 03/29/2021    COVID-19 Pfizer Vac BIVALENT Yoan-sucrose 12 Yr+ IM 12/07/2022    INFLUENZA 09/09/2015    Influenza Split High Dose Preservative Free IM 09/09/2015        Current Medications:    Current Outpatient Medications:     hydrochlorothiazide (HYDRODIURIL) 25 mg tablet, Take 1 tablet (25 mg total) by mouth daily, Disp: 90 tablet, Rfl: 3    levothyroxine 25 mcg tablet, Take 1 tablet (25 mcg total) by mouth daily, Disp: 90 tablet, Rfl: 3    losartan (COZAAR) 25 mg tablet, Take 1 tablet (25 mg total) by mouth daily, Disp: 90 tablet, Rfl: 3    simvastatin (ZOCOR) 40 mg tablet, Take 1 tablet (40 mg total) by mouth daily at bedtime, Disp: 90 tablet, Rfl: 3    Historical Information   Past Medical History:   Diagnosis Date    Disease of thyroid gland     Hyperlipidemia      Past Surgical History:   Procedure Laterality Date    BREAST BIOPSY Right 04/26/2012    COLONOSCOPY N/A 3/20/2019    Procedure: COLONOSCOPY;  Surgeon: Chris Mcfadden MD;  Location[facility];  Service: Gastroenterology    TONSILLECTOMY       Social History   Social History     Tobacco Use   Smoking Status Never   Smokeless Tobacco Never       Family History:   Family History   Problem Relation Age of Onset    No Known Problems Sister     No Known Problems Daughter     Bone cancer Maternal Grandmother     Brain cancer Maternal Aunt     No Known Problems Maternal Uncle     No Known Problems Daughter                  Diagnostic Data:  Labs:  I personally reviewed the most recent laboratory data pertinent to today's visit    Lab Results   Component Value Date    WBC 5.21 08/05/2024    HGB 14.2 08/05/2024    HCT 42.1 08/05/2024    MCV 96 08/05/2024     08/05/2024     Lab Results   Component  "Value Date    GLUCOSE 88 08/10/2015    CALCIUM 9.5 08/05/2024     08/10/2015    K 3.6 08/05/2024    CO2 25 08/05/2024     08/05/2024    BUN 19 08/05/2024    CREATININE 0.68 08/05/2024     No results found for: \"IGE\"  Lab Results   Component Value Date    ALT 14 08/05/2024    AST 14 08/05/2024    ALKPHOS 42 08/05/2024    BILITOT 0.51 08/10/2015           I have spent a total time of 40 minutes on 08/14/24 in caring for this patient including Diagnostic results, Prognosis, Risks and benefits of tx options, and Instructions for management.   _        "

## 2024-08-23 ENCOUNTER — HOSPITAL ENCOUNTER (OUTPATIENT)
Dept: PULMONOLOGY | Facility: HOSPITAL | Age: 74
End: 2024-08-23
Payer: MEDICARE

## 2024-08-23 DIAGNOSIS — R06.02 SOB (SHORTNESS OF BREATH) ON EXERTION: ICD-10-CM

## 2024-08-23 PROCEDURE — 94729 DIFFUSING CAPACITY: CPT

## 2024-08-23 PROCEDURE — 94726 PLETHYSMOGRAPHY LUNG VOLUMES: CPT | Performed by: INTERNAL MEDICINE

## 2024-08-23 PROCEDURE — 94060 EVALUATION OF WHEEZING: CPT

## 2024-08-23 PROCEDURE — 94760 N-INVAS EAR/PLS OXIMETRY 1: CPT

## 2024-08-23 PROCEDURE — 94726 PLETHYSMOGRAPHY LUNG VOLUMES: CPT

## 2024-08-23 PROCEDURE — 94729 DIFFUSING CAPACITY: CPT | Performed by: INTERNAL MEDICINE

## 2024-08-23 PROCEDURE — 94060 EVALUATION OF WHEEZING: CPT | Performed by: INTERNAL MEDICINE

## 2024-08-23 RX ORDER — ALBUTEROL SULFATE 0.83 MG/ML
2.5 SOLUTION RESPIRATORY (INHALATION) ONCE
Status: COMPLETED | OUTPATIENT
Start: 2024-08-23 | End: 2024-08-23

## 2024-08-23 RX ADMIN — ALBUTEROL SULFATE 2.5 MG: 2.5 SOLUTION RESPIRATORY (INHALATION) at 08:58

## 2024-09-08 PROBLEM — Z00.00 MEDICARE ANNUAL WELLNESS VISIT, SUBSEQUENT: Status: RESOLVED | Noted: 2019-02-11 | Resolved: 2024-09-08

## 2024-10-08 ENCOUNTER — CONSULT (OUTPATIENT)
Dept: CARDIOLOGY CLINIC | Facility: CLINIC | Age: 74
End: 2024-10-08
Payer: MEDICARE

## 2024-10-08 VITALS
HEART RATE: 90 BPM | HEIGHT: 64 IN | SYSTOLIC BLOOD PRESSURE: 118 MMHG | WEIGHT: 191 LBS | BODY MASS INDEX: 32.61 KG/M2 | DIASTOLIC BLOOD PRESSURE: 78 MMHG | OXYGEN SATURATION: 94 %

## 2024-10-08 DIAGNOSIS — R07.9 CHEST PAIN IN ADULT: ICD-10-CM

## 2024-10-08 DIAGNOSIS — G47.20 ABNORMAL CIRCADIAN RHYTHM: ICD-10-CM

## 2024-10-08 DIAGNOSIS — Z91.81 HISTORY OF RECENT FALL: ICD-10-CM

## 2024-10-08 DIAGNOSIS — I10 BENIGN ESSENTIAL HYPERTENSION: ICD-10-CM

## 2024-10-08 DIAGNOSIS — E78.2 MIXED HYPERLIPIDEMIA: ICD-10-CM

## 2024-10-08 DIAGNOSIS — R06.02 SOB (SHORTNESS OF BREATH) ON EXERTION: Primary | ICD-10-CM

## 2024-10-08 DIAGNOSIS — R26.89 BALANCE PROBLEMS: ICD-10-CM

## 2024-10-08 PROCEDURE — 93000 ELECTROCARDIOGRAM COMPLETE: CPT | Performed by: INTERNAL MEDICINE

## 2024-10-08 PROCEDURE — 99214 OFFICE O/P EST MOD 30 MIN: CPT | Performed by: INTERNAL MEDICINE

## 2024-10-08 RX ORDER — EZETIMIBE 10 MG/1
10 TABLET ORAL DAILY
Qty: 30 TABLET | Refills: 6 | Status: SHIPPED | OUTPATIENT
Start: 2024-10-08

## 2024-10-08 NOTE — PROGRESS NOTES
[facility]  [address] [place] [address] [address]   [place] [address]  Cardiology Consultation    Shraddha Araiza  [id number]  1950      Consult for: Shortness of breath  Appreciate consult by: Carina Lafleur MD    1. SOB (shortness of breath) on exertion  POCT ECG    Ambulatory Referral to Cardiology      2. Benign essential hypertension  POCT ECG      3. History of recent fall        4. Chest pain in adult           Discussion/Summary:   Exertional shortness of breath/abnormal EKG-infarct pattern on baseline EKG.  High ASCVD risk score.  Plan for nuclear stress test to evaluate for inducible ischemia or prior infarction.  Echo 2D to evaluate diastolic function and pulmonary pressures.  She is currently on losartan 25 mg and hydrochlorothiazide 25 mg.  Possible addition of SGLT2 inhibitor?    Frequent falls-she is noted to have right abductor lower hip weakness.  We will have her work with physical therapy.  She is currently working with a chiropractor.  She may need additional muscles strengthening.  She has had multiple falls.  We will also evaluate for any major inducible arrhythmias with stress testing.  Her baseline EKG does not show any advanced heart blocks.  Her orthostatics here were negative     Dyslipidemia-triglycerides and LDL are not at goal.  She is currently on simvastatin 40 mg.  We will add on Zetia 10 mg daily.    Hypothyroidism currently compliant with levothyroxine 25 mcg daily    Abnormal circadian rhythm/witnessed apnea-consideration for sleep testing.    HPI:   74-year-old with history of hypertension, hyperlipidemia presents with increasing exertional dyspnea and episodes of frequent falls.  She states having some without a prodrome.  She also states having severe imbalance in her legs due to hip pain and weakness.  She reports having some tightness in her chest at times.  She has been compliant with her medications.  She states having some lower extremity  swelling.  No prior history of atrial fibrillation    Past Medical History:   Diagnosis Date    Disease of thyroid gland     Hyperlipidemia      Social History     Socioeconomic History    Marital status: /Civil Union     Spouse name: Not on file    Number of children: Not on file    Years of education: Not on file    Highest education level: Not on file   Occupational History    Not on file   Tobacco Use    Smoking status: Never    Smokeless tobacco: Never   Vaping Use    Vaping status: Never Used   Substance and Sexual Activity    Alcohol use: Yes    Drug use: Never    Sexual activity: Not on file   Other Topics Concern    Not on file   Social History Narrative    Not on file     Social Determinants of Health     Financial Resource Strain: Low Risk  (7/17/2023)    Overall Financial Resource Strain (CARDIA)     Difficulty of Paying Living Expenses: Not very hard   Food Insecurity: No Food Insecurity (8/9/2024)    Hunger Vital Sign     Worried About Running Out of Food in the Last Year: Never true     Ran Out of Food in the Last Year: Never true   Transportation Needs: No Transportation Needs (8/9/2024)    PRAPARE - Transportation     Lack of Transportation (Medical): No     Lack of Transportation (Non-Medical): No   Physical Activity: Not on file   Stress: Not on file   Social Connections: Not on file   Intimate Partner Violence: Not on file   Housing Stability: Low Risk  (8/9/2024)    Housing Stability Vital Sign     Unable to Pay for Housing in the Last Year: No     Number of Times Moved in the Last Year: 1     Homeless in the Last Year: No      Family History   Problem Relation Age of Onset    No Known Problems Sister     No Known Problems Daughter     Bone cancer Maternal Grandmother     Brain cancer Maternal Aunt     No Known Problems Maternal Uncle     No Known Problems Daughter      Past Surgical History:   Procedure Laterality Date    BREAST BIOPSY Right 04/26/2012    COLONOSCOPY N/A 3/20/2019     "Procedure: COLONOSCOPY;  Surgeon: Chris Mcfadden MD;  Location: [facility];  Service: Gastroenterology    TONSILLECTOMY         Current Outpatient Medications:     hydrochlorothiazide (HYDRODIURIL) 25 mg tablet, Take 1 tablet (25 mg total) by mouth daily, Disp: 90 tablet, Rfl: 3    levothyroxine 25 mcg tablet, Take 1 tablet (25 mcg total) by mouth daily, Disp: 90 tablet, Rfl: 3    losartan (COZAAR) 25 mg tablet, Take 1 tablet (25 mg total) by mouth daily, Disp: 90 tablet, Rfl: 3    simvastatin (ZOCOR) 40 mg tablet, Take 1 tablet (40 mg total) by mouth daily at bedtime, Disp: 90 tablet, Rfl: 3  Allergies   Allergen Reactions    Nickel Rash     Vitals:    10/08/24 0841 10/08/24 0849 10/08/24 0852   BP: 118/64 104/62 118/78   BP Location: Right arm Right arm Right arm   Patient Position: Supine Sitting Standing   Cuff Size: Large Large Large   Pulse: 79 84 90   SpO2: 94%     Weight: 86.6 kg (191 lb)     Height: 5' 4\" (1.626 m)         Review of Systems:   Review of Systems   Constitutional: Negative.  Negative for activity change, appetite change, chills, diaphoresis, fatigue, fever and unexpected weight change.   HENT: Negative.  Negative for congestion, dental problem, drooling, ear discharge, ear pain, facial swelling, hearing loss, mouth sores, nosebleeds, postnasal drip, rhinorrhea, sinus pressure, sinus pain, sneezing, sore throat, tinnitus, trouble swallowing and voice change.    Eyes: Negative.  Negative for photophobia, pain, redness, itching and visual disturbance.   Respiratory: Negative.  Negative for apnea, cough, choking, chest tightness, shortness of breath, wheezing and stridor.    Cardiovascular:  Positive for chest pain, palpitations and leg swelling.   Gastrointestinal: Negative.  Negative for abdominal distention, abdominal pain, anal bleeding, blood in stool, constipation, diarrhea, nausea, rectal pain and vomiting.   Endocrine: Negative.  Negative for cold intolerance, heat intolerance, " "polydipsia, polyphagia and polyuria.   Genitourinary: Negative.  Negative for decreased urine volume, difficulty urinating, dyspareunia, dysuria, enuresis, flank pain, frequency, genital sores, hematuria, menstrual problem, pelvic pain, urgency, vaginal bleeding, vaginal discharge and vaginal pain.   Musculoskeletal:  Positive for arthralgias and gait problem. Negative for back pain, joint swelling, myalgias, neck pain and neck stiffness.   Skin: Negative.  Negative for color change, pallor, rash and wound.   Allergic/Immunologic: Negative.  Negative for environmental allergies, food allergies and immunocompromised state.   Neurological:  Negative for dizziness, tremors, seizures, syncope, facial asymmetry, speech difficulty, weakness, light-headedness, numbness and headaches.   Hematological: Negative.  Negative for adenopathy. Does not bruise/bleed easily.   Psychiatric/Behavioral: Negative.  Negative for agitation, behavioral problems, confusion, decreased concentration, dysphoric mood, hallucinations, self-injury, sleep disturbance and suicidal ideas. The patient is not nervous/anxious and is not hyperactive.    All other systems reviewed and are negative.      Vitals:    10/08/24 0841 10/08/24 0849 10/08/24 0852   BP: 118/64 104/62 118/78   BP Location: Right arm Right arm Right arm   Patient Position: Supine Sitting Standing   Cuff Size: Large Large Large   Pulse: 79 84 90   SpO2: 94%     Weight: 86.6 kg (191 lb)     Height: 5' 4\" (1.626 m)       Physical Examination:   Physical Exam  Constitutional:       General: She is not in acute distress.     Appearance: She is well-developed. She is not diaphoretic.   HENT:      Head: Normocephalic and atraumatic.      Right Ear: External ear normal.      Left Ear: External ear normal.   Eyes:      General: No scleral icterus.        Right eye: No discharge.         Left eye: No discharge.      Conjunctiva/sclera: Conjunctivae normal.      Pupils: Pupils are equal, " round, and reactive to light.   Neck:      Thyroid: No thyromegaly.      Vascular: No JVD.      Trachea: No tracheal deviation.   Cardiovascular:      Rate and Rhythm: Normal rate and regular rhythm.      Heart sounds: No murmur heard.     No friction rub. No gallop.   Pulmonary:      Effort: Pulmonary effort is normal. No respiratory distress.      Breath sounds: Normal breath sounds. No stridor. No wheezing or rales.   Chest:      Chest wall: No tenderness.   Abdominal:      General: Bowel sounds are normal. There is no distension.      Palpations: Abdomen is soft. There is no mass.      Tenderness: There is no abdominal tenderness. There is no guarding or rebound.   Musculoskeletal:         General: No deformity. Normal range of motion.      Cervical back: Normal range of motion and neck supple.      Right lower leg: Tenderness present. Edema present.      Left lower leg: Edema present.   Skin:     General: Skin is warm and dry.      Coloration: Skin is not pale.      Findings: No erythema or rash.   Neurological:      Mental Status: She is alert and oriented to person, place, and time.      Cranial Nerves: No cranial nerve deficit.      Motor: No abnormal muscle tone.      Coordination: Coordination normal.      Deep Tendon Reflexes: Reflexes are normal and symmetric. Reflexes normal.   Psychiatric:         Mood and Affect: Mood and affect normal.         Behavior: Behavior normal.         Thought Content: Thought content normal.         Judgment: Judgment normal.         Labs:     Lab Results   Component Value Date    WBC 5.21 08/05/2024    HGB 14.2 08/05/2024    HCT 42.1 08/05/2024    MCV 96 08/05/2024    RDW 12.9 08/05/2024     08/05/2024     BMP:  Lab Results   Component Value Date    SODIUM 143 08/05/2024    K 3.6 08/05/2024     08/05/2024    CO2 25 08/05/2024    ANIONGAP 10 08/10/2015    BUN 19 08/05/2024    CREATININE 0.68 08/05/2024    GLUC 88 07/21/2016    GLUF 84 08/05/2024    CALCIUM 9.5  "08/05/2024    EGFR 87 08/05/2024     LFT:  Lab Results   Component Value Date    AST 14 08/05/2024    ALT 14 08/05/2024    ALKPHOS 42 08/05/2024    TP 6.6 08/05/2024    ALB 4.1 08/05/2024      Lab Results   Component Value Date    QVT1EUSIUOCE 2.924 08/05/2024     No results found for: \"HGBA1C\"  Lipid Profile:   Lab Results   Component Value Date    CHOLESTEROL 187 08/05/2024    HDL 57 08/05/2024    LDLCALC 98 08/05/2024    TRIG 160 (H) 08/05/2024     Lab Results   Component Value Date    CHOLESTEROL 187 08/05/2024    CHOLESTEROL 189 01/24/2024       Imaging & Testing   I have personally reviewed pertinent reports.      Cardiac Testing         EKG: Personally reviewed.    Normal sinus rhythm with inferior Q waves and poor R wave progression      Evens Ambrose MD [facility]  [phone]  Please call with any questions or suggestions    Counseling :  A description of the counseling:   Goals and Barriers:  Patient's ability to self care:  Medication side effect reviewed with patient in detail and all their questions answered.    \"Portions of the record may have been created with voice recognition software. Occasional wrong word or \"sound a like\" substitutions may have occurred due to the inherent limitations of voice recognition software. Read the chart carefully and recognize, using context, where substitutions have occurred. Please call if you have any questions. \"    "

## 2024-10-14 ENCOUNTER — OFFICE VISIT (OUTPATIENT)
Dept: FAMILY MEDICINE CLINIC | Facility: CLINIC | Age: 74
End: 2024-10-14
Payer: MEDICARE

## 2024-10-14 VITALS
SYSTOLIC BLOOD PRESSURE: 128 MMHG | HEART RATE: 92 BPM | WEIGHT: 190 LBS | OXYGEN SATURATION: 96 % | BODY MASS INDEX: 32.44 KG/M2 | HEIGHT: 64 IN | DIASTOLIC BLOOD PRESSURE: 80 MMHG | TEMPERATURE: 98.2 F

## 2024-10-14 DIAGNOSIS — J01.10 ACUTE NON-RECURRENT FRONTAL SINUSITIS: Primary | ICD-10-CM

## 2024-10-14 PROCEDURE — G2211 COMPLEX E/M VISIT ADD ON: HCPCS | Performed by: FAMILY MEDICINE

## 2024-10-14 PROCEDURE — 99213 OFFICE O/P EST LOW 20 MIN: CPT | Performed by: FAMILY MEDICINE

## 2024-10-14 RX ORDER — AMOXICILLIN 875 MG/1
875 TABLET, COATED ORAL 2 TIMES DAILY
Qty: 14 TABLET | Refills: 0 | Status: SHIPPED | OUTPATIENT
Start: 2024-10-14 | End: 2024-10-21

## 2024-10-14 NOTE — PROGRESS NOTES
Subjective:      Patient ID: Shraddha Araiza is a 74 y.o. female.    Sinusitis  This is a new problem. The current episode started in the past 7 days. The problem is unchanged. The maximum temperature recorded prior to her arrival was 100.4 - 100.9 F. The fever has been present for Less than 1 day. Associated symptoms include congestion, coughing, headaches, a hoarse voice, sinus pressure, sneezing and a sore throat. Pertinent negatives include no shortness of breath. Treatments tried: OTC. The treatment provided mild relief.   Patient is reporting symptoms of productive cough with sinus congestion and postnasal drip.    Past Medical History:   Diagnosis Date    Disease of thyroid gland     Hyperlipidemia        Family History   Problem Relation Age of Onset    No Known Problems Sister     No Known Problems Daughter     Bone cancer Maternal Grandmother     Brain cancer Maternal Aunt     No Known Problems Maternal Uncle     No Known Problems Daughter        Past Surgical History:   Procedure Laterality Date    BREAST BIOPSY Right 04/26/2012    COLONOSCOPY N/A 3/20/2019    Procedure: COLONOSCOPY;  Surgeon: Chris Mcfadden MD;  [facility];  Service: Gastroenterology    TONSILLECTOMY          reports that she has never smoked. She has never used smokeless tobacco. She reports current alcohol use. She reports that she does not use drugs.      Current Outpatient Medications:     amoxicillin (AMOXIL) 875 mg tablet, Take 1 tablet (875 mg total) by mouth 2 (two) times a day for 7 days, Disp: 14 tablet, Rfl: 0    ezetimibe (ZETIA) 10 mg tablet, Take 1 tablet (10 mg total) by mouth daily, Disp: 30 tablet, Rfl: 6    hydrochlorothiazide (HYDRODIURIL) 25 mg tablet, Take 1 tablet (25 mg total) by mouth daily, Disp: 90 tablet, Rfl: 3    levothyroxine 25 mcg tablet, Take 1 tablet (25 mcg total) by mouth daily, Disp: 90 tablet, Rfl: 3    losartan (COZAAR) 25 mg tablet, Take 1 tablet (25 mg total) by mouth daily,  "Disp: 90 tablet, Rfl: 3    simvastatin (ZOCOR) 40 mg tablet, Take 1 tablet (40 mg total) by mouth daily at bedtime, Disp: 90 tablet, Rfl: 3    The following portions of the patient's history were reviewed and updated as appropriate: allergies, current medications, past family history, past medical history, past social history, past surgical history and problem list.    Review of Systems   HENT:  Positive for congestion, hoarse voice, sinus pressure, sneezing and sore throat.    Respiratory:  Positive for cough. Negative for shortness of breath.    Neurological:  Positive for headaches.           Objective:    /80   Pulse 92   Temp 98.2 °F (36.8 °C)   Ht 5' 4\" (1.626 m)   Wt 86.2 kg (190 lb)   SpO2 96%   BMI 32.61 kg/m²      Physical Exam  Constitutional:       Appearance: Normal appearance.   HENT:      Right Ear: Tympanic membrane normal.      Left Ear: Tympanic membrane normal.      Mouth/Throat:      Pharynx: Posterior oropharyngeal erythema present.   Cardiovascular:      Heart sounds: Normal heart sounds.   Pulmonary:      Breath sounds: Normal breath sounds.           No results found for this or any previous visit (from the past 1008 hour(s)).    Assessment/Plan:    No problem-specific Assessment & Plan notes found for this encounter.           Problem List Items Addressed This Visit          Respiratory    Acute non-recurrent frontal sinusitis - Primary     Acute URI.  Patient started on oral antibiotic.  Follow-up if symptoms do not improve.         Relevant Medications    amoxicillin (AMOXIL) 875 mg tablet       "

## 2024-10-30 ENCOUNTER — HOSPITAL ENCOUNTER (OUTPATIENT)
Dept: NON INVASIVE DIAGNOSTICS | Facility: CLINIC | Age: 74
Discharge: HOME | End: 2024-10-30
Payer: MEDICARE

## 2024-10-30 VITALS
BODY MASS INDEX: 32.44 KG/M2 | DIASTOLIC BLOOD PRESSURE: 80 MMHG | HEART RATE: 72 BPM | SYSTOLIC BLOOD PRESSURE: 128 MMHG | WEIGHT: 190.04 LBS | HEIGHT: 64 IN

## 2024-10-30 DIAGNOSIS — R06.02 SOB (SHORTNESS OF BREATH) ON EXERTION: ICD-10-CM

## 2024-10-30 DIAGNOSIS — I10 BENIGN ESSENTIAL HYPERTENSION: ICD-10-CM

## 2024-10-30 DIAGNOSIS — R07.9 CHEST PAIN IN ADULT: ICD-10-CM

## 2024-10-30 DIAGNOSIS — E78.2 MIXED HYPERLIPIDEMIA: ICD-10-CM

## 2024-10-30 LAB
AORTIC ROOT: 3.1 CM
APICAL FOUR CHAMBER EJECTION FRACTION: 60 %
ASCENDING AORTA: 3.2 CM
BSA FOR ECHO PROCEDURE: 1.91 M2
CHEST PAIN STATEMENT: NORMAL
E WAVE DECELERATION TIME: 260 MS
E/A RATIO: 0.83
FRACTIONAL SHORTENING: 27 (ref 28–44)
INTERVENTRICULAR SEPTUM IN DIASTOLE (PARASTERNAL SHORT AXIS VIEW): 1.2 CM
INTERVENTRICULAR SEPTUM: 1.2 CM (ref 0.6–1.1)
LAAS-AP2: 14.1 CM2
LAAS-AP4: 15.8 CM2
LEFT ATRIUM SIZE: 3.9 CM
LEFT ATRIUM VOLUME (MOD BIPLANE): 36 ML
LEFT ATRIUM VOLUME INDEX (MOD BIPLANE): 18.8 ML/M2
LEFT INTERNAL DIMENSION IN SYSTOLE: 3 CM (ref 2.1–4)
LEFT VENTRICULAR INTERNAL DIMENSION IN DIASTOLE: 4.1 CM (ref 3.5–6)
LEFT VENTRICULAR POSTERIOR WALL IN END DIASTOLE: 1.1 CM
LEFT VENTRICULAR STROKE VOLUME: 39 ML
LVSV (TEICH): 39 ML
MAX DIASTOLIC BP: 80 MMHG
MAX HR PERCENT: 73 %
MAX HR: 108 BPM
MAX PREDICTED HEART RATE: 146 BPM
MV E'TISSUE VEL-LAT: 10 CM/S
MV E'TISSUE VEL-SEP: 8 CM/S
MV PEAK A VEL: 0.86 M/S
MV PEAK E VEL: 71 CM/S
MV STENOSIS PRESSURE HALF TIME: 75 MS
MV VALVE AREA P 1/2 METHOD: 2.93
NUC STRESS EJECTION FRACTION: 73 %
PROTOCOL NAME: NORMAL
RATE PRESSURE PRODUCT: NORMAL
REASON FOR TERMINATION: NORMAL
RIGHT ATRIAL 2D VOLUME: 30 ML
RIGHT ATRIUM AREA SYSTOLE A4C: 13.3 CM2
RIGHT VENTRICLE ID DIMENSION: 2.9 CM
SL CV LEFT ATRIUM LENGTH A2C: 4.9 CM
SL CV LV EF: 60
SL CV PED ECHO LEFT VENTRICLE DIASTOLIC VOLUME (MOD BIPLANE) 2D: 74 ML
SL CV PED ECHO LEFT VENTRICLE SYSTOLIC VOLUME (MOD BIPLANE) 2D: 35 ML
SL CV REST NUCLEAR ISOTOPE DOSE: 8 MCI
SL CV STRESS NUCLEAR ISOTOPE DOSE: 25.3 MCI
SL CV STRESS RECOVERY BP: NORMAL MMHG
SL CV STRESS RECOVERY HR: 90 BPM
SL CV STRESS RECOVERY O2 SAT: 96 %
STRESS ANGINA INDEX: 0
STRESS BASELINE BP: NORMAL MMHG
STRESS BASELINE HR: 72 BPM
STRESS O2 SAT REST: 97 %
STRESS PEAK HR: 105 BPM
STRESS POST ESTIMATED WORKLOAD: 1.5 METS
STRESS POST EXERCISE DUR MIN: 3 MIN
STRESS POST EXERCISE DUR MIN: 3 MIN
STRESS POST EXERCISE DUR SEC: 0 SEC
STRESS POST O2 SAT PEAK: 96 %
STRESS POST PEAK BP: 114 MMHG
STRESS POST PEAK HR: 108 BPM
STRESS POST PEAK SYSTOLIC BP: 138 MMHG
STRESS/REST PERFUSION RATIO: 1.16
TARGET HR FORMULA: NORMAL
TEST INDICATION: NORMAL
TRICUSPID ANNULAR PLANE SYSTOLIC EXCURSION: 1.8 CM

## 2024-10-30 PROCEDURE — 78452 HT MUSCLE IMAGE SPECT MULT: CPT

## 2024-10-30 PROCEDURE — 93306 TTE W/DOPPLER COMPLETE: CPT | Performed by: INTERNAL MEDICINE

## 2024-10-30 PROCEDURE — 78452 HT MUSCLE IMAGE SPECT MULT: CPT | Performed by: INTERNAL MEDICINE

## 2024-10-30 PROCEDURE — 93017 CV STRESS TEST TRACING ONLY: CPT

## 2024-10-30 PROCEDURE — 93016 CV STRESS TEST SUPVJ ONLY: CPT | Performed by: INTERNAL MEDICINE

## 2024-10-30 PROCEDURE — 93018 CV STRESS TEST I&R ONLY: CPT | Performed by: INTERNAL MEDICINE

## 2024-10-30 PROCEDURE — A9502 TC99M TETROFOSMIN: HCPCS

## 2024-10-30 PROCEDURE — 93306 TTE W/DOPPLER COMPLETE: CPT

## 2024-10-30 RX ORDER — REGADENOSON 0.08 MG/ML
0.4 INJECTION, SOLUTION INTRAVENOUS ONCE
Status: COMPLETED | OUTPATIENT
Start: 2024-10-30 | End: 2024-10-30

## 2024-10-30 RX ORDER — EZETIMIBE 10 MG/1
10 TABLET ORAL DAILY
Qty: 90 TABLET | Refills: 1 | Status: SHIPPED | OUTPATIENT
Start: 2024-10-30

## 2024-10-30 RX ADMIN — REGADENOSON 0.4 MG: 0.08 INJECTION, SOLUTION INTRAVENOUS at 13:04

## 2024-11-04 ENCOUNTER — TELEPHONE (OUTPATIENT)
Dept: CARDIOLOGY CLINIC | Facility: CLINIC | Age: 74
End: 2024-11-04

## 2024-11-04 NOTE — TELEPHONE ENCOUNTER
----- Message from Evens Ambrose MD sent at 11/4/2024 10:28 AM EST -----  Echo overall looks good. Will discuss further on follow-up

## 2024-11-04 NOTE — TELEPHONE ENCOUNTER
----- Message from Evens Ambrose MD sent at 11/4/2024 10:28 AM EST -----  Patient stress test overall without major changes

## 2024-11-06 ENCOUNTER — EVALUATION (OUTPATIENT)
Dept: PHYSICAL THERAPY | Facility: CLINIC | Age: 74
End: 2024-11-06
Payer: MEDICARE

## 2024-11-06 DIAGNOSIS — M25.561 ACUTE PAIN OF RIGHT KNEE: Primary | ICD-10-CM

## 2024-11-06 DIAGNOSIS — R26.89 BALANCE PROBLEMS: ICD-10-CM

## 2024-11-06 PROCEDURE — 97110 THERAPEUTIC EXERCISES: CPT

## 2024-11-06 PROCEDURE — 97162 PT EVAL MOD COMPLEX 30 MIN: CPT

## 2024-11-06 NOTE — PROGRESS NOTES
PT Evaluation     Today's date: 2024  Patient name: Shraddha Araiza  : 1950  MRN: [id number]  Referring provider: Evens Ambrose MD  Dx:   Encounter Diagnosis     ICD-10-CM    1. Acute pain of right knee  M25.561       2. Balance problems  R26.89 Ambulatory referral to Physical Therapy                     Assessment  Impairments: abnormal coordination, abnormal gait, abnormal muscle firing, abnormal movement, activity intolerance, impaired balance, impaired physical strength, lacks appropriate home exercise program and safety issue    Assessment details: Shraddha Araiza is a pleasant 74 y.o. female presents with signs and symptoms consistent with:   Balance    Problem List:  1)   2)    she has lack of terminal knee extension and decreased quad strength due to knee swelling which is affecting her balance and gait, and decreased functional outcomes resulting in worry over not knowing what's wrong and fear of not being able to keep active. These impairments listed above are preventing the patient from participating in functional activity. At this time patient will benefit from No further referral appears necessary at this time based upon examination results, and is negative for any red flags. Prognosis is good given HEP compliance and attendance to physical therapy 2 x a week.  Positive prognostic indicators include positive attitude toward recovery and good understanding of diagnosis and treatment plan options.  Negative prognostic indicators include chronicity of symptoms.  Patent will benefit from skilled physical therapy at this time to address deficits to improve overall function and return to PLOF. Patient verbalized understanding of POC, HEP, and return demonstrated HEP. All questions were answered to patients satisfaction.     Please contact me if you have any questions or recommendations. Thank you for the referral and the opportunity to share in Shraddha Araiza's care.    Comparable  signs:  1)  2)   Understanding of Dx/Px/POC: good     Prognosis: good    Goals  Impairment Goals 4-6 weeks  In order to improve and maximize function patient will be able to...  - Improve overall strength in both LE and UE to 5/5 to allow for good reactive strategies and to get up off the floor  - Demonstrate McTSIB within age related norms  - Demonstrate at least 90 on ABC scale          Functional Goals 6-8 weeks  In order to improve and maximize function patient will be able to...  - Increase Functional Status Measure (FOTO) to predicted outcome measures  - Be independent and compliant with HEP   - Participate in ADL/IADLS without feeling unsteady or LOB  - Decrease TUG and 5xSTS to below 13 seconds to demonstrate less risk of falls by DC  - Report no instances of falls since beginning PT  - Ambulate on even and uneven surfaces safely without an AD.   - Ambulate in a straight line with head turns without gait deviation.           Plan  Patient would benefit from: skilled PT    Planned therapy interventions: neuromuscular re-education, patient education, strengthening, stretching, therapeutic activities, therapeutic exercise, home exercise program, functional ROM exercises, postural training, gait training and transfer training    Frequency: 2-3x week.  Treatment plan discussed with: patient      Subjective Evaluation    History of Present Illness  Mechanism of injury: Patient presents with balance concerns to this date. She states that it has been getting progressively worse. She reports that she does not utilize an AD. When she walks she does bring a walking stick with her. She reports a fall in June and October 6. She was walking down the street and fell and in October she was walking down the street at [place]. She stated that if she does fall she needs assistance to get up off the floor. She reported her last fall she fell straight on her knees and her R knee is still bothering her, and reports it as a  dull ache.     Difficulty with: steps, walking, uneven surfaces.   Patient Goals  Patient goals for therapy: decreased pain, independence with ADLs/IADLs and return to sport/leisure activities  Patient goal: not fallign down anymore  Pain  Current pain ratin  At worst pain ratin  Location: r knee          Objective     Functional Assessment        Comments  5x STS:26  TUG:15 s   SLS:R:3 L:3  McTSIB  EO::NV                EC::NV  EOF:NV                  ECF:NV    MMT:  L Hip ER:3+/5                                R Hip ER:3+/5  L Hip IR:3+/5                                     R Hip IR:3+/5  L Hip flex:3/5                                 R Hip flex:3/5  L Knee ext:4/5                                 R knee ext:3-/5  L Knee flex:4/5                               R knee flex:4/5  L DF:4/5                                           R DF:4/5  L PF:5/5                                           R PF:5/5        General Comments:      Knee Comments  Lack of terminal knee extension on Rknee  -TTP along quad and patella tendon slight edema noted, negative valgus, varus, brandy, posterior drawer, anterior drawer              POC Expires Auth Status Start Date Expiration Date PT Visit Limit     BOMN   Date        Used        Remaining           Diagnosis:  Balance/Deconditioning   Precautions:  See chart   Comparable signs 1) Walking  2)    Primary Impairments: 1) Quad activation  2) Decreased sensation   Patient Goals Improve balance   Manual Therapy                                             Re-evaluation          Exercise Diary          Therapeutic Exercise         Bike/Nu Step         Hip 3 ways          LAQ/SAQ         Leg press                                    Neuromuscular Re-education         Bridge         SLR          Quad sets         Wobble boad         Baps board         EO/EC                                                       Therapeutic Activities         Education  POC,  diagnosis, expecations                                            Modalities

## 2024-11-07 ENCOUNTER — OFFICE VISIT (OUTPATIENT)
Dept: PULMONOLOGY | Facility: CLINIC | Age: 74
End: 2024-11-07
Payer: MEDICARE

## 2024-11-07 VITALS
TEMPERATURE: 97.2 F | DIASTOLIC BLOOD PRESSURE: 70 MMHG | OXYGEN SATURATION: 98 % | HEART RATE: 71 BPM | WEIGHT: 188.4 LBS | SYSTOLIC BLOOD PRESSURE: 124 MMHG | BODY MASS INDEX: 32.17 KG/M2 | HEIGHT: 64 IN

## 2024-11-07 DIAGNOSIS — R06.02 SOB (SHORTNESS OF BREATH) ON EXERTION: Primary | ICD-10-CM

## 2024-11-07 PROCEDURE — 99213 OFFICE O/P EST LOW 20 MIN: CPT | Performed by: STUDENT IN AN ORGANIZED HEALTH CARE EDUCATION/TRAINING PROGRAM

## 2024-11-07 PROCEDURE — G2211 COMPLEX E/M VISIT ADD ON: HCPCS | Performed by: STUDENT IN AN ORGANIZED HEALTH CARE EDUCATION/TRAINING PROGRAM

## 2024-11-07 NOTE — PROGRESS NOTES
Consultation - Pulmonary Medicine   Shraddha Araiza 74 y.o. female MRN: [id number]      Reason for Consult: Dyspnea    Shraddha Araiza is a 74 y.o. female with a PMH of HTN, Hypothyroidism who presents fo revaluation of shortness of breath.    Dyspnea on Exertion - Likely related to deconditioning. Pulmonary and cardiac etiologies unremarkable. She has a decent exercise tolerance but would benefit from strength training.         Dru Soliman MD  [facility]    _____________________________________________________________________  Interval Hx 11/07/24:   Stress test unremarkable  PFTs wnl  Fatigue with 3 mile, limited by knee  Recent mechanical fall, following with PT      HPI:    Shraddha Araiza is a 74 y.o. female with a PMH of HTN, Hypothyroidism who presents fo revaluation of shortness of breath.    Dyspnea with exertion 9,000ft climbing 2 flights  Fatigued at [place] 14,000  Symptoms generally improved now she has returned to [place].  Occasional chest pressure  Denies any wheezing or coughing    Tobacco: Never, Secondhand smoker   Asthma Hx: Denies  Triggers/Allergies: None,  Exercise  Exposure/Work:  [employer] -   Pets: None  CHF Hx: HTN  Pulm Meds: None  ET:  <4 flights          PFT results:  The most recent pulmonary function tests were reviewed.  Spirometry:     FVC:   2.48L       99% predicted                Z-score: -0.06                                FEV1: 1.92L      98% predicted                Z-score: -0.10                                                          FEV1/FVC Ratio:                                     Z-score: -0.15                                                After administration of bronchodilator:                   FVC:     2.35L   -4 % change   FEV1:   1.91L   0 % change      Lung volumes by body plethysmography:       Total Lung Capacity:    4.49L    Z-score: -1.05                   Residual volume:          2.14L    -0.35%  "predicted                                         RV/TLC Ratio:               48%                                                   DLCO was corrected for patients hemoglobin level: 92% predicted        Z-score: -0.47                       Imaging:  I personally reviewed the images on the PAC system pertinent to today's visit  CXR   Clear lungs. No pneumothorax or pleural effusion.     Normal cardiomediastinal silhouette.     Bones are unremarkable for age.     Normal upper abdomen.     IMPRESSION:     No acute cardiopulmonary disease.       Other studies:  Stress Test    Stress ECG: No ST deviation is noted. The ECG was not diagnostic due to pharmacological (vasodilator) stress. The stress ECG is equivocal for ischemia after pharmacologic vasodilation.    Stress Function: Left ventricular function post-stress is normal. Stress ejection fraction is 73%.    Perfusion: There is a left ventricular perfusion defect that is small in size with mild reduction in uptake present in the apical location(s) that is paradoxical. The defect appears to be probable artifact caused by breast attenuation.    Stress Combined Conclusion: There is image artifact, without diagnostic evidence for perfusion abnormality.     TTE    Left Ventricle: Left ventricular cavity size is normal. Wall thickness is mildly increased. There is concentric remodeling. The left ventricular ejection fraction is 60%. Systolic function is normal. Wall motion is normal. Diastolic function is normal for age.    Right Ventricle: Right ventricular cavity size is normal. Systolic function is normal.    PhysicalExamination:  Vitals:   /70 (BP Location: Left arm, Patient Position: Sitting, Cuff Size: Standard)   Pulse 71   Temp (!) 97.2 °F (36.2 °C) (Tympanic)   Ht 5' 4\" (1.626 m)   Wt 85.5 kg (188 lb 6.4 oz)   SpO2 98%   BMI 32.34 kg/m²     Appearance -- NAD, speaking full sentences  Neuro -- A&Ox3  Neck -- no JVD  Heart -- RRR, no murmurs  Lungs -- " CTAB  Abdomen -- soft, NTND  Extremities -- WWP, no LE edema  Skin -- no rash    Review of Systems:  Aside from what is mentioned in the HPI, the review of systems otherwise negative.    Immunization History   Administered Date(s) Administered    COVID-19 PFIZER VACCINE 0.3 ML IM 03/09/2021, 03/29/2021    COVID-19 Pfizer Vac BIVALENT Yoan-sucrose 12 Yr+ IM 12/07/2022    INFLUENZA 09/09/2015    Influenza Split High Dose Preservative Free IM 09/09/2015        Current Medications:    Current Outpatient Medications:     ezetimibe (ZETIA) 10 mg tablet, TAKE 1 TABLET BY MOUTH EVERY DAY, Disp: 90 tablet, Rfl: 1    hydrochlorothiazide (HYDRODIURIL) 25 mg tablet, Take 1 tablet (25 mg total) by mouth daily, Disp: 90 tablet, Rfl: 3    levothyroxine 25 mcg tablet, Take 1 tablet (25 mcg total) by mouth daily, Disp: 90 tablet, Rfl: 3    losartan (COZAAR) 25 mg tablet, Take 1 tablet (25 mg total) by mouth daily, Disp: 90 tablet, Rfl: 3    simvastatin (ZOCOR) 40 mg tablet, Take 1 tablet (40 mg total) by mouth daily at bedtime, Disp: 90 tablet, Rfl: 3    Historical Information   Past Medical History:   Diagnosis Date    Disease of thyroid gland     Hyperlipidemia      Past Surgical History:   Procedure Laterality Date    BREAST BIOPSY Right 04/26/2012    COLONOSCOPY N/A 3/20/2019    Procedure: COLONOSCOPY;  Surgeon: Chris Mcfadden MD;  Location: [facility];  Service: Gastroenterology    TONSILLECTOMY       Social History   Social History     Tobacco Use   Smoking Status Never   Smokeless Tobacco Never       Family History:   Family History   Problem Relation Age of Onset    No Known Problems Sister     No Known Problems Daughter     Bone cancer Maternal Grandmother     Brain cancer Maternal Aunt     No Known Problems Maternal Uncle     No Known Problems Daughter                  Diagnostic Data:  Labs:  I personally reviewed the most recent laboratory data pertinent to today's visit    Lab Results   Component Value Date    WBC  "5.21 08/05/2024    HGB 14.2 08/05/2024    HCT 42.1 08/05/2024    MCV 96 08/05/2024     08/05/2024     Lab Results   Component Value Date    GLUCOSE 88 08/10/2015    CALCIUM 9.5 08/05/2024     08/10/2015    K 3.6 08/05/2024    CO2 25 08/05/2024     08/05/2024    BUN 19 08/05/2024    CREATININE 0.68 08/05/2024     No results found for: \"IGE\"  Lab Results   Component Value Date    ALT 14 08/05/2024    AST 14 08/05/2024    ALKPHOS 42 08/05/2024    BILITOT 0.51 08/10/2015           I have spent a total time of 20 minutes on 11/07/24 in caring for this patient including Diagnostic results, Prognosis, Risks and benefits of tx options, and Instructions for management.   _        "

## 2024-11-08 ENCOUNTER — OFFICE VISIT (OUTPATIENT)
Dept: PHYSICAL THERAPY | Facility: CLINIC | Age: 74
End: 2024-11-08
Payer: MEDICARE

## 2024-11-08 DIAGNOSIS — R26.89 BALANCE PROBLEMS: Primary | ICD-10-CM

## 2024-11-08 DIAGNOSIS — M25.561 ACUTE PAIN OF RIGHT KNEE: ICD-10-CM

## 2024-11-08 PROCEDURE — 97110 THERAPEUTIC EXERCISES: CPT

## 2024-11-08 PROCEDURE — 97112 NEUROMUSCULAR REEDUCATION: CPT

## 2024-11-08 NOTE — PROGRESS NOTES
Daily Note     Today's date: 2024  Patient name: Shraddha Araiza  : 1950  MRN: [id number]  Referring provider: Evens Ambrose MD  Dx:   Encounter Diagnosis     ICD-10-CM    1. Balance problems  R26.89       2. Acute pain of right knee  M25.561           Subjective: C/o R knee pain as 3/10 upon arrival. Notes pain was 8/10, but took 2 aleve.      Objective: See treatment diary below      Assessment: Pt does demo inhibited quad contraction with quad sets. Limited with SLR due to c/o groin pain. Able to incorporate standing strengthening and proprioceptive training. Fearful of increased reps with step ups due to possible knee irritation. Able to complete balance training with minimal postural sway.     Plan: Continue per plan of care.  Progress treatment as tolerated.         POC Expires Auth Status Start Date Expiration Date PT Visit Limit     BOMN   Date        Used        Remaining           Diagnosis:  Balance/Deconditioning   Precautions:  See chart   Comparable signs 1) Walking  2)    Primary Impairments: 1) Quad activation  2) Decreased sensation   Patient Goals Improve balance   Manual Therapy                                            Re-evaluation          Exercise Diary          Therapeutic Exercise         Bike/Nu Step  Nustep 5'       Hip 3 ways   10x B       LAQ/SAQ  SAQ  LAQ    2x10ea       Leg press  DL 50# 2x10                                  Neuromuscular Re-education         Bridge  2x10       SLR   10x, groin pain       Quad sets  5'', 10x       Wobble boad  Taps 20x  Balance 1' ea       EO/EC   Foam  FTEC  Semitandem EC  30''x2       Tandem walking  3 laps       Step ups  6'', 10x B                                  Therapeutic Activities         Education  POC, diagnosis, expecations                                            Modalities

## 2024-11-13 ENCOUNTER — OFFICE VISIT (OUTPATIENT)
Dept: PHYSICAL THERAPY | Facility: CLINIC | Age: 74
End: 2024-11-13
Payer: MEDICARE

## 2024-11-13 DIAGNOSIS — R26.89 BALANCE PROBLEMS: Primary | ICD-10-CM

## 2024-11-13 DIAGNOSIS — M25.561 ACUTE PAIN OF RIGHT KNEE: ICD-10-CM

## 2024-11-13 PROBLEM — J01.10 ACUTE NON-RECURRENT FRONTAL SINUSITIS: Status: RESOLVED | Noted: 2023-10-11 | Resolved: 2024-11-13

## 2024-11-13 PROCEDURE — 97112 NEUROMUSCULAR REEDUCATION: CPT

## 2024-11-13 PROCEDURE — 97110 THERAPEUTIC EXERCISES: CPT

## 2024-11-13 NOTE — PROGRESS NOTES
"Daily Note     Today's date: 2024  Patient name: Shraddha Araiza  : 1950  MRN: [id number]  Referring provider: Evens Ambrose MD  Dx:   Encounter Diagnosis     ICD-10-CM    1. Balance problems  R26.89       2. Acute pain of right knee  M25.561           Start Time: 1045  Stop Time: 1120  Total time in clinic (min): 40 minutes      Subjective: Patient reports 2/10 R knee pain which remains the most aggravated when descending steps. Patient notes no significant soreness after various additions of exercises during last session.      Objective: See treatment diary below      Assessment: Tolerated treatment well today. Patient remains moderately challenged with SLR due to continued onset of R groin pain throughout exercise, therefore was unable to complete >10 reps. Increased reps on leg press without any adverse response noted by patient. Added step downs today to promote improvements in quad Patient demonstrated a stable and steady stance during balance exercises and did not experience any significant episodes of LOB despite needing minimal fingertip assist to restore slight bouts of unsteadiness. Patient would benefit from continued PT to address R knee and balance/gait training deficits in order to maximize overall functional ability.      Plan: Continue per plan of care.  Progress treatment as tolerated.         POC Expires Auth Status Start Date Expiration Date PT Visit Limit     BOMN   Date        Used        Remaining           Diagnosis:  Balance/Deconditioning   Precautions:  See chart   Comparable signs 1) Walking  2)    Primary Impairments: 1) Quad activation  2) Decreased sensation   Patient Goals Improve balance   Manual Therapy                                           Re-evaluation          Exercise Diary          Therapeutic Exercise         Bike/Nu Step  Nustep 5' NuStep 5'      Hip 3 ways   10x B 2x10 ea B      LAQ/SAQ  SAQ  LAQ    2x10ea SAQ  LAQ    3\" " "2x10 ea      Leg press  DL 50# 2x10 DL 50# 1x10  55# 1x10                                 Neuromuscular Re-education         Bridge  2x10 2x10      SLR   10x, groin pain 10x, cont groin p!      Quad sets  5'', 10x 5\" 2x10      Wobble boad  Taps 20x  Balance 1' ea Taps 20x ea      EO/EC   Foam  FTEC  Semitandem EC  30''x2 Foam  FTEC  Semitandem EC  30''x2 ea      Tandem walking  3 laps 3 laps       Step ups  6'', 10x B 6\" 15x ea      Step downs   4\" 10x ea                        Therapeutic Activities         Education  POC, diagnosis, expecations                                            Modalities                            "

## 2024-11-14 ENCOUNTER — OFFICE VISIT (OUTPATIENT)
Dept: PHYSICAL THERAPY | Facility: CLINIC | Age: 74
End: 2024-11-14
Payer: MEDICARE

## 2024-11-14 DIAGNOSIS — R26.89 BALANCE PROBLEMS: Primary | ICD-10-CM

## 2024-11-14 DIAGNOSIS — M25.561 ACUTE PAIN OF RIGHT KNEE: ICD-10-CM

## 2024-11-14 PROCEDURE — 97110 THERAPEUTIC EXERCISES: CPT

## 2024-11-14 PROCEDURE — 97112 NEUROMUSCULAR REEDUCATION: CPT

## 2024-11-14 NOTE — PROGRESS NOTES
Daily Note     Today's date: 2024  Patient name: Shraddha Araiza  : 1950  MRN: [id number]  Referring provider: Evens Ambrose MD  Dx:   Encounter Diagnosis     ICD-10-CM    1. Balance problems  R26.89       2. Acute pain of right knee  M25.561         Start Time: 1045  End Time: 1115  Total time in clinic (min): 30 minutes    Subjective: Patient reports moderate R knee and glute soreness after progressions of exercises during last session, and requested decreasing intensity of exercises during today's session.      Objective: See treatment diary below      Assessment: Tolerated treatment well today. Held supine knee strengthening and mobility exercises due to subjective findings of sig soreness, and focused more on balance exercises today, with no significant increases in sxs noted throughout session. Progressed modified tandem stance by having patient perform regular/full tandem stance with EO which was more of a challenge for patient but did not cause any significant LOB throughout. Balance exercises towards the end of session began to exacerbate knee sxs, therefore concluded session early today. Resume previous POC next session if tolerable. Patient would benefit from continued PT to address R knee and balance/gait training deficits in order to maximize overall functional ability.      Plan: Continue per plan of care.  Progress treatment as tolerated.         POC Expires Auth Status Start Date Expiration Date PT Visit Limit     BOMN   Date        Used        Remaining           Diagnosis:  Balance/Deconditioning   Precautions:  See chart   Comparable signs 1) Walking  2)    Primary Impairments: 1) Quad activation  2) Decreased sensation   Patient Goals Improve balance   Manual Therapy                                          Re-evaluation          Exercise Diary          Therapeutic Exercise         Bike/Nu Step  Nustep 5' NuStep 5' NuStep 5'     Hip 3 ways    "10x B 2x10 ea B 10x B     LAQ/SAQ  SAQ  LAQ    2x10ea SAQ  LAQ    3\" 2x10 ea      Leg press  DL 50# 2x10 DL 50# 1x10  55# 1x10 DL 55# 2x10                                Neuromuscular Re-education         Bridge  2x10 2x10      SLR   10x, groin pain 10x, cont groin p!      Quad sets  5'', 10x 5\" 2x10      Wobble boad  Taps 20x  Balance 1' ea Taps 20x ea Taps 20x  Balance 1' ea     EO/EC   Foam  FTEC  Semitandem EC  30''x2 Foam  FTEC  Semitandem EC  30''x2 ea Foam  FTEC  andem EC  30''x2 ea     Tandem walking  3 laps 3 laps  3 laps     Step ups  6'', 10x B 6\" 15x ea L 6\"  R 4\"     Step downs   4\" 10x ea      Sidestepping                  Therapeutic Activities         Education  POC, diagnosis, expecations                                            Modalities                            "

## 2024-11-18 ENCOUNTER — APPOINTMENT (OUTPATIENT)
Dept: PHYSICAL THERAPY | Facility: CLINIC | Age: 74
End: 2024-11-18
Payer: MEDICARE

## 2024-11-20 ENCOUNTER — OFFICE VISIT (OUTPATIENT)
Dept: PHYSICAL THERAPY | Facility: CLINIC | Age: 74
End: 2024-11-20
Payer: MEDICARE

## 2024-11-20 DIAGNOSIS — R26.89 BALANCE PROBLEMS: Primary | ICD-10-CM

## 2024-11-20 DIAGNOSIS — M25.561 ACUTE PAIN OF RIGHT KNEE: ICD-10-CM

## 2024-11-20 PROCEDURE — 97530 THERAPEUTIC ACTIVITIES: CPT

## 2024-11-20 PROCEDURE — 97112 NEUROMUSCULAR REEDUCATION: CPT

## 2024-11-20 PROCEDURE — 97110 THERAPEUTIC EXERCISES: CPT

## 2024-11-20 NOTE — PROGRESS NOTES
Daily Note     Today's date: 2024  Patient name: Shraddha Araiza  : 1950  MRN: [id number]  Referring provider: Evens Ambrose MD  Dx:   Encounter Diagnosis     ICD-10-CM    1. Balance problems  R26.89       2. Acute pain of right knee  M25.561           Start Time: 915  Stop Time: 1000  Total time in clinic (min): 45 minutes    Subjective: Patient reports ongoing moderate R glute discomfort that tends to radiate down to her R knee, and rarely gets relief from noted sxs. Patient notes that she previously went to a chiropractor for noted sxs, with some relief afterwards but never truly subsides.     Objective: See treatment diary below      Assessment: Tolerated treatment well today. Continued with current POC focused on balance/gait training and B LE strengthening with good tolerance and an appropriate level of challenge demonstrated by patient throughout session. Added sidestepping and lateral step ups to promote improvements in B glute med strength and mobility, with no adverse response noted by patient. Primary PT instructed patient to perform repeated lumbar ext followed by hip sags in attempts to subside noted glute/RLE radicular sxs, which provided patient with some relief and improvements after performance. Instructed patient to perform these repeated motions/exercises at home when sxs are exacerbated in which patient verbalized understanding. Continue to progress patient as able. Patient would benefit from continued PT to address R knee and balance/gait training deficits in order to maximize overall functional ability.      Plan: Continue per plan of care.  Progress treatment as tolerated.         POC Expires Auth Status Start Date Expiration Date PT Visit Limit     BOMN   Date        Used        Remaining           Diagnosis:  Balance/Deconditioning   Precautions:  See chart   Comparable signs 1) Walking  2)    Primary Impairments: 1) Quad activation  2) Decreased sensation  "  Patient Goals Improve balance   Manual Therapy 11/6 11/8 11/13 11/14 11/20                                        Re-evaluation          Exercise Diary          Therapeutic Exercise         Bike/Nu Step  Nustep 5' NuStep 5' NuStep 5' NuStep 5'    Hip 3 ways   10x B 2x10 ea B 10x B 2x10 B    LAQ/SAQ  SAQ  LAQ    2x10ea SAQ  LAQ    3\" 2x10 ea      Leg press  DL 50# 2x10 DL 50# 1x10  55# 1x10 DL 55# 2x10     Hip sags     2x10    Lumbar ext     2x10             Neuromuscular Re-education         Bridge  2x10 2x10      SLR   10x, groin pain 10x, cont groin p!      Quad sets  5'', 10x 5\" 2x10      Wobble boad  Taps 20x  Balance 1' ea Taps 20x ea Taps 20x  Balance 1' ea Taps 20x  Balance 1' ea    EO/EC   Foam  FTEC  Semitandem EC  30''x2 Foam  FTEC  Semitandem EC  30''x2 ea Foam  FTEC  Tandem EC  30''x2 ea Foam  FTEC  Tandem EO  30''x2 ea    Tandem walking  3 laps 3 laps  3 laps     Step ups  6'', 10x B 6\" 15x ea L 6\"  R 4\" Fwd 6\" 2x10 ea  Lat 4\" 10x ea    Step downs   4\" 10x ea      Sidestepping     PTB 2 laps             Therapeutic Activities         Education  POC, diagnosis, expecations                                            Modalities                            "

## 2024-11-21 ENCOUNTER — OFFICE VISIT (OUTPATIENT)
Dept: PHYSICAL THERAPY | Facility: CLINIC | Age: 74
End: 2024-11-21
Payer: MEDICARE

## 2024-11-21 DIAGNOSIS — M25.561 ACUTE PAIN OF RIGHT KNEE: ICD-10-CM

## 2024-11-21 DIAGNOSIS — R26.89 BALANCE PROBLEMS: Primary | ICD-10-CM

## 2024-11-21 PROCEDURE — 97112 NEUROMUSCULAR REEDUCATION: CPT

## 2024-11-21 PROCEDURE — 97530 THERAPEUTIC ACTIVITIES: CPT

## 2024-11-21 NOTE — PROGRESS NOTES
Daily Note     Today's date: 2024  Patient name: Shraddha Araiza  : 1950  MRN: [id number]  Referring provider: Evens Ambrose MD  Dx:   Encounter Diagnosis     ICD-10-CM    1. Balance problems  R26.89       2. Acute pain of right knee  M25.561             Start Time: 915  Stop Time: 1000  Total time in clinic (min): 45 minutes    Subjective: Patient reports ongoing moderate R glute discomfort that tends to radiate down to her R knee, and rarely gets relief from noted sxs. Patient notes that she previously went to a chiropractor for noted sxs, with some relief afterwards but never truly subsides.     Objective: See treatment diary below      Assessment: Tolerated treatment well today. Continued with current POC focused on balance/gait training and B LE strengthening with good tolerance and an appropriate level of challenge demonstrated by patient throughout session. Added sidestepping and lateral step ups to promote improvements in B glute med strength and mobility, with no adverse response noted by patient. Primary PT instructed patient to perform repeated lumbar ext followed by hip sags in attempts to subside noted glute/RLE radicular sxs, which provided patient with some relief and improvements after performance. Instructed patient to perform these repeated motions/exercises at home when sxs are exacerbated in which patient verbalized understanding. Continue to progress patient as able. Patient would benefit from continued PT to address R knee and balance/gait training deficits in order to maximize overall functional ability.      Plan: Continue per plan of care.  Progress treatment as tolerated.         POC Expires Auth Status Start Date Expiration Date PT Visit Limit     BOMN   Date        Used        Remaining           Diagnosis:  Balance/Deconditioning   Precautions:  See chart   Comparable signs 1) Walking  2)    Primary Impairments: 1) Quad activation  2) Decreased sensation  "  Patient Goals Improve balance   Manual Therapy 11/6 11/8 11/13 11/14 11/20 11/21                                       Re-evaluation          Exercise Diary          Therapeutic Exercise         Bike/Nu Step  Nustep 5' NuStep 5' NuStep 5' NuStep 5'    Hip 3 ways   10x B 2x10 ea B 10x B 2x10 B    LAQ/SAQ  SAQ  LAQ    2x10ea SAQ  LAQ    3\" 2x10 ea      Leg press  DL 50# 2x10 DL 50# 1x10  55# 1x10 DL 55# 2x10     Hip sags     2x10    Lumbar ext     2x10             Neuromuscular Re-education         Bridge  2x10 2x10      SLR   10x, groin pain 10x, cont groin p!      Quad sets  5'', 10x 5\" 2x10      Wobble boad  Taps 20x  Balance 1' ea Taps 20x ea Taps 20x  Balance 1' ea Taps 20x  Balance 1' ea    EO/EC   Foam  FTEC  Semitandem EC  30''x2 Foam  FTEC  Semitandem EC  30''x2 ea Foam  FTEC  Tandem EC  30''x2 ea Foam  FTEC  Tandem EO  30''x2 ea    Tandem walking  3 laps 3 laps  3 laps     Step ups  6'', 10x B 6\" 15x ea L 6\"  R 4\" Fwd 6\" 2x10 ea  Lat 4\" 10x ea    Step downs   4\" 10x ea      Sidestepping     PTB 2 laps             Therapeutic Activities         Education  POC, diagnosis, expecations                                            Modalities                            "

## 2024-11-21 NOTE — PROGRESS NOTES
Daily Note     Today's date: 2024  Patient name: Shraddha Araiza  : 1950  MRN: [id number]  Referring provider: Evens Ambrose MD  Dx:   Encounter Diagnosis     ICD-10-CM    1. Balance problems  R26.89       2. Acute pain of right knee  M25.561           Start Time: 1700  Stop Time: 1745  Total time in clinic (min): 45 minutes    Subjective: Patient reports significant improvements in R hip and knee sxs after inclusion of hip sags and lumbar ext last session.      Objective: See treatment diary below      Assessment: Tolerated treatment well today. Continued with current POC focused on balance/gait training and B LE strengthening with good tolerance and an appropriate level of challenge demonstrated by patient throughout session. Progressed tandem walking by adding foam beams to challenge ambulating on an uneven surface, which was more challenging and fatiguing for patient, but did not result in any episodes of LOB throughout. Patient displayed more difficulties with balance exercises today and experienced more episodes of unsteadiness/instability, but did not have any significant LOB. Continue to progress patient as able. Patient would benefit from continued PT to address R knee and balance/gait training deficits in order to maximize overall functional ability.      Plan: Continue per plan of care.          POC Expires Auth Status Start Date Expiration Date PT Visit Limit     BOMN   Date        Used        Remaining           Diagnosis:  Balance/Deconditioning   Precautions:  See chart   Comparable signs 1) Walking  2)    Primary Impairments: 1) Quad activation  2) Decreased sensation   Patient Goals Improve balance   Manual Therapy                                        Re-evaluation          Exercise Diary          Therapeutic Exercise         Bike/Nu Step  Nustep 5' NuStep 5' NuStep 5' NuStep 5' NuStep 5'   Hip 3 ways   10x B 2x10 ea B 10x B 2x10 B  "2x10 B   LAQ/SAQ  SAQ  LAQ    2x10ea SAQ  LAQ    3\" 2x10 ea      Leg press  DL 50# 2x10 DL 50# 1x10  55# 1x10 DL 55# 2x10  DL 55# 2x10   Hip sags     2x10 2x10   Lumbar ext     2x10 2x10            Neuromuscular Re-education         Bridge  2x10 2x10      SLR   10x, groin pain 10x, cont groin p!      Quad sets  5'', 10x 5\" 2x10      Wobble boad  Taps 20x  Balance 1' ea Taps 20x ea Taps 20x  Balance 1' ea Taps 20x  Balance 1' ea Taps 20x  Balance 1' ea   EO/EC   Foam  FTEC  Semitandem EC  30''x2 Foam  FTEC  Semitandem EC  30''x2 ea Foam  FTEC  Tandem EC  30''x2 ea Foam  FTEC  Tandem EO  30''x2 ea Foam  FTEC  Tandem EC firm  30''x2 ea   Tandem walking  3 laps 3 laps  3 laps  Foam beams 3 laps   Step ups  6'', 10x B 6\" 15x ea L 6\"  R 4\" Fwd 6\" 2x10 ea  Lat 4\" 10x ea Fwd 6\" 2x10 ea  Lat 4\" 2x10x ea   Step downs   4\" 10x ea      Sidestepping     PTB 2 laps PTB 2 laps            Therapeutic Activities         Education  POC, diagnosis, expecations                                            Modalities                              "

## 2024-11-25 ENCOUNTER — OFFICE VISIT (OUTPATIENT)
Dept: PHYSICAL THERAPY | Facility: CLINIC | Age: 74
End: 2024-11-25
Payer: MEDICARE

## 2024-11-25 DIAGNOSIS — R26.89 BALANCE PROBLEMS: Primary | ICD-10-CM

## 2024-11-25 DIAGNOSIS — M25.561 ACUTE PAIN OF RIGHT KNEE: ICD-10-CM

## 2024-11-25 PROCEDURE — 97110 THERAPEUTIC EXERCISES: CPT

## 2024-11-25 PROCEDURE — 97530 THERAPEUTIC ACTIVITIES: CPT

## 2024-11-25 PROCEDURE — 97112 NEUROMUSCULAR REEDUCATION: CPT

## 2024-11-25 NOTE — PROGRESS NOTES
Daily Note     Today's date: 2024  Patient name: Shraddha Araiza  : 1950  MRN: [id number]  Referring provider: Evens Ambrose MD  Dx:   Encounter Diagnosis     ICD-10-CM    1. Balance problems  R26.89       2. Acute pain of right knee  M25.561           Start Time: 1400  Stop Time: 1440  Total time in clinic (min): 40 minutes      Subjective: Patient reports improvements in R hip and knee sxs since last session and noted having no exacerbations of sxs over the weekend up until yesterday, where sxs were aggravated and R hip/knee were both so tight and sore that she was unable to properly climb her stairs with significant discomfort.      Objective: See treatment diary below      Assessment: Tolerated treatment well today. Continued with current POC focused on balance/gait training and B LE strengthening with good tolerance and an appropriate level of challenge demonstrated by patient throughout session. Performed hip 3 way exercise while on foam to promote improvements in dynamic balance while on an unsteady surface as well as increasing B LE mobility/stability, with no adverse response demonstrated by patient. Added STS exercise today to practice daily positional changes while ensuring proper form and body mechanics to prevent compensations from occurring, with good tolerance and no increases in sxs noted by patient despite moderate increases in fatigue level. Continue to progress patient as able. Patient would benefit from continued PT to address R knee and balance/gait training deficits in order to maximize overall functional ability.      Plan: Continue per plan of care.          POC Expires Auth Status Start Date Expiration Date PT Visit Limit     BOMN   Date        Used        Remaining           Diagnosis:  Balance/Deconditioning   Precautions:  See chart   Comparable signs 1) Walking  2)    Primary Impairments: 1) Quad activation  2) Decreased sensation   Patient Goals Improve  "balance   Manual Therapy 11/25 11/8 11/13 11/14 11/20 11/21                                       Re-evaluation          Exercise Diary          Therapeutic Exercise         Bike/Nu Step NuStep 5' Nustep 5' NuStep 5' NuStep 5' NuStep 5' NuStep 5'   Hip 3 ways  On foam 10x B 10x B 2x10 ea B 10x B 2x10 B 2x10 B   LAQ/SAQ  SAQ  LAQ    2x10ea SAQ  LAQ    3\" 2x10 ea      Leg press DL 55# lvl 5 2x10 DL 50# 2x10 DL 50# 1x10  55# 1x10 DL 55# 2x10  DL 55# 2x10   Hip sags 2x10    2x10 2x10   Lumbar ext 2x10    2x10 2x10            Neuromuscular Re-education         Bridge  2x10 2x10      SLR   10x, groin pain 10x, cont groin p!      Quad sets  5'', 10x 5\" 2x10      Wobble boad Taps 30x  Balance 1' ea Taps 20x  Balance 1' ea Taps 20x ea Taps 20x  Balance 1' ea Taps 20x  Balance 1' ea Taps 20x  Balance 1' ea   EO/EC  SLB firm 30\"x2  Tandem EC w/ foam 30\"x2 ea Foam  FTEC  Semitandem EC  30''x2 Foam  FTEC  Semitandem EC  30''x2 ea Foam  FTEC  Tandem EC  30''x2 ea Foam  FTEC  Tandem EO  30''x2 ea Foam  FTEC  Tandem EC firm  30''x2 ea   Tandem walking NV 3 laps 3 laps  3 laps  Foam beams 3 laps   Step ups Fwd 6\" 2x10 ea  Lat 6\" 10x L, 4\" 2x10 R 6'', 10x B 6\" 15x ea L 6\"  R 4\" Fwd 6\" 2x10 ea  Lat 4\" 10x ea Fwd 6\" 2x10 ea  Lat 4\" 2x10x ea   Step downs   4\" 10x ea      Sidestepping PTB 3 laps    PTB 2 laps PTB 2 laps   HR/TR 2x10 ea        Therapeutic Activities         Education          STS No HHA 2x5                                   Modalities                              "

## 2024-11-27 ENCOUNTER — OFFICE VISIT (OUTPATIENT)
Dept: PHYSICAL THERAPY | Facility: CLINIC | Age: 74
End: 2024-11-27
Payer: MEDICARE

## 2024-11-27 DIAGNOSIS — R26.89 BALANCE PROBLEMS: Primary | ICD-10-CM

## 2024-11-27 DIAGNOSIS — M25.561 ACUTE PAIN OF RIGHT KNEE: ICD-10-CM

## 2024-11-27 PROCEDURE — 97110 THERAPEUTIC EXERCISES: CPT

## 2024-11-27 PROCEDURE — 97530 THERAPEUTIC ACTIVITIES: CPT

## 2024-11-27 PROCEDURE — 97112 NEUROMUSCULAR REEDUCATION: CPT

## 2024-11-27 NOTE — PROGRESS NOTES
Daily Note     Today's date: 2024  Patient name: Shraddha Araiza  : 1950  MRN: [id number]  Referring provider: Evens Ambrose MD  Dx:   Encounter Diagnosis     ICD-10-CM    1. Balance problems  R26.89       2. Acute pain of right knee  M25.561                          Subjective: Patient reports that her knee is really bothering to this date and going up stairs is more challenging.       Objective: See treatment diary below      Assessment: Tolerated treatment well today. Began with heel slides for hip and knee mobility and to improve mobility. She is challenged with TKE and required moderate cueing to prevent compensation with hip and core. She demonstrates difficulty in single leg stance on her R foot with increase in symptoms in hip and knee with difficulty stabilizing. She had reduction in symptoms after repeated extension and advised to follow up at home.  Emphasis placed on end range to this date. Patient would benefit from continued PT to address R knee and balance/gait training deficits in order to maximize overall functional ability.      Plan: Continue per plan of care.          POC Expires Auth Status Start Date Expiration Date PT Visit Limit     BOMN   Date        Used        Remaining           Diagnosis:  Balance/Deconditioning   Precautions:  See chart   Comparable signs 1) Walking  2)    Primary Impairments: 1) Quad activation  2) Decreased sensation   Patient Goals Improve balance   Manual Therapy                                        Re-evaluation          Exercise Diary          Therapeutic Exercise         Bike/Nu Step NuStep 5' NuStep 5'   NuStep 5' NuStep 5' NuStep 5'   Hip 3 ways  On foam 10x B PTB 10x  10x B 2x10 B 2x10 B   LAQ/SAQ         Leg press DL 55# lvl 5 2x10 DL 50#2x12 end range   DL 55# 2x10  DL 55# 2x10   Hip sags 2x10    2x10 2x10   Lumbar ext 2x10 30x    2x10 2x10            Neuromuscular Re-education         Bridge     "     SLR          Quad sets         Wobble boad Taps 30x  Balance 1' ea   Taps 20x  Balance 1' ea Taps 20x  Balance 1' ea Taps 20x  Balance 1' ea   EO/EC  SLB firm 30\"x2  Tandem EC w/ foam 30\"x2 ea   Foam  FTEC  Tandem EC  30''x2 ea Foam  FTEC  Tandem EO  30''x2 ea Foam  FTEC  Tandem EC firm  30''x2 ea   Tandem walking NV   3 laps  Foam beams 3 laps   Step ups Fwd 6\" 2x10 ea  Lat 6\" 10x L, 4\" 2x10 R 4\" with TKE   L 6\"  R 4\" Fwd 6\" 2x10 ea  Lat 4\" 10x ea Fwd 6\" 2x10 ea  Lat 4\" 2x10x ea   Step downs         Sidestepping PTB 3 laps    PTB 2 laps PTB 2 laps   HR/TR 2x10 ea        Therapeutic Activities         Education          STS No HHA 2x5        Backwards walking   15# 10x                         Modalities                              "

## 2024-12-02 ENCOUNTER — OFFICE VISIT (OUTPATIENT)
Dept: PHYSICAL THERAPY | Facility: CLINIC | Age: 74
End: 2024-12-02
Payer: MEDICARE

## 2024-12-02 DIAGNOSIS — R26.89 BALANCE PROBLEMS: Primary | ICD-10-CM

## 2024-12-02 DIAGNOSIS — M25.561 ACUTE PAIN OF RIGHT KNEE: ICD-10-CM

## 2024-12-02 PROCEDURE — 97112 NEUROMUSCULAR REEDUCATION: CPT

## 2024-12-02 PROCEDURE — 97530 THERAPEUTIC ACTIVITIES: CPT

## 2024-12-02 PROCEDURE — 97110 THERAPEUTIC EXERCISES: CPT

## 2024-12-02 NOTE — PROGRESS NOTES
"Daily Note     Today's date: 2024  Patient name: Shraddha Araiza  : 1950  MRN: [id number]  Referring provider: Evens Ambrose MD  Dx:   Encounter Diagnosis     ICD-10-CM    1. Balance problems  R26.89       2. Acute pain of right knee  M25.561             Start Time: 1400  Stop Time: 1440  Total time in clinic (min): 40 minutes      Subjective: Patient reports \"feeling great all weekend until today\", noting that she was coming down from being up on a ladder and thought she did not have any steps left, but missed the last step and as a result, experienced an episode of LOB/instability which caused her lumbar and R LE sxs to flare up as a result.      Objective: See treatment diary below      Assessment: Tolerated treatment well today. Continued with current POC focused on balance/gait training and B LE strengthening with good tolerance and an appropriate level of challenge demonstrated by patient throughout session. STS caused mild increases in lumbar and R hip sxs, therefore performed repeated lumbar ext follow by repeated hip sags to reduce sxs, which provided patient with relief and decreased intensity of sxs as a result. Added fwd resisted walking @ [facility] today to promote improvements in maintaining a steady and controlled gait without compensations occurring, with good tolerance demonstrated by patient. Continue to progress patient as able. Patient would benefit from continued PT to address R knee and balance/gait training deficits in order to maximize overall functional ability.     Plan: Continue per plan of care.          POC Expires Auth Status Start Date Expiration Date PT Visit Limit     BOMN   Date        Used        Remaining           Diagnosis:  Balance/Deconditioning   Precautions:  See chart   Comparable signs 1) Walking  2)    Primary Impairments: 1) Quad activation  2) Decreased sensation   Patient Goals Improve balance   Manual Therapy  " "11/21                                       Re-evaluation          Exercise Diary          Therapeutic Exercise         Bike/Nu Step NuStep 5' NuStep 5'  NuStep 5' NuStep 5' NuStep 5' NuStep 5'   Hip 3 ways  On foam 10x B PTB 10x PTB 10x B 10x B 2x10 B 2x10 B   LAQ/SAQ         Leg press DL 55# lvl 5 2x10 DL 50#2x12 end range  DL 55# 2x12 end range  DL 55# 2x10  DL 55# 2x10   Hip sags 2x10  2x10  2x10 2x10   Lumbar ext 2x10 30x  2x10  2x10 2x10            Neuromuscular Re-education         Bridge         SLR          Quad sets         Wobble boad Taps 30x  Balance 1' ea   Taps 20x  Balance 1' ea Taps 20x  Balance 1' ea Taps 20x  Balance 1' ea   EO/EC  SLB firm 30\"x2  Tandem EC w/ foam 30\"x2 ea  Foam  FTEC  Tandem EO foam  30''x2 ea Foam  FTEC  Tandem EC  30''x2 ea Foam  FTEC  Tandem EO  30''x2 ea Foam  FTEC  Tandem EC firm  30''x2 ea   Tandem walking NV   3 laps  Foam beams 3 laps   Step ups Fwd 6\" 2x10 ea  Lat 6\" 10x L, 4\" 2x10 R 4\" with TKE  4\" w/ TKE 2x10 B L 6\"  R 4\" Fwd 6\" 2x10 ea  Lat 4\" 10x ea Fwd 6\" 2x10 ea  Lat 4\" 2x10x ea   Step downs         Sidestepping PTB 3 laps  PTB 3 laps  PTB 2 laps PTB 2 laps   HR/TR 2x10 ea  2x10 ea      Therapeutic Activities         Education          STS No HHA 2x5  No HHA 10x      Backwards walking   15# 10x 15# 10x      Resisted fwd walking @ [place]   8# 10x               Modalities                              "

## 2024-12-04 ENCOUNTER — OFFICE VISIT (OUTPATIENT)
Dept: PHYSICAL THERAPY | Facility: CLINIC | Age: 74
End: 2024-12-04
Payer: MEDICARE

## 2024-12-04 DIAGNOSIS — M25.561 ACUTE PAIN OF RIGHT KNEE: ICD-10-CM

## 2024-12-04 DIAGNOSIS — R26.89 BALANCE PROBLEMS: Primary | ICD-10-CM

## 2024-12-04 PROCEDURE — 97530 THERAPEUTIC ACTIVITIES: CPT

## 2024-12-04 PROCEDURE — 97110 THERAPEUTIC EXERCISES: CPT

## 2024-12-04 PROCEDURE — 97112 NEUROMUSCULAR REEDUCATION: CPT

## 2024-12-04 NOTE — PROGRESS NOTES
"Daily Note     Today's date: 2024  Patient name: Shraddha Araiza  : 1950  MRN: [id number]  Referring provider: Evens Ambrose MD  Dx:   Encounter Diagnosis     ICD-10-CM    1. Balance problems  R26.89       2. Acute pain of right knee  M25.561             Start Time: 1130  Stop Time: 1210  Total time in clinic (min): 40 minutes      Subjective: Patient reports \"my R hip is flared up from the chiropractor earlier today\", but otherwise notes no new complaints since last session.      Objective: See treatment diary below      Assessment: Tolerated treatment well today. Continued with current POC focused on balance/gait training and B LE strengthening with good tolerance and an appropriate level of challenge demonstrated by patient throughout session. Progressed patient today by adding both foam beams and hurdles during sidestepping and tandem walking to further challenge proprioception, dual tasking, as well as maintaining a steady/stable gait while on an uneven surface, with good tolerance. Continue to progress patient as able. Patient would benefit from continued PT to address R knee and balance/gait training deficits in order to maximize overall functional ability.       Plan: Continue per plan of care.          POC Expires Auth Status Start Date Expiration Date PT Visit Limit     BOMN   Date        Used        Remaining           Diagnosis:  Balance/Deconditioning   Precautions:  See chart   Comparable signs 1) Walking  2)    Primary Impairments: 1) Quad activation  2) Decreased sensation   Patient Goals Improve balance   Manual Therapy                                        Re-evaluation          Exercise Diary          Therapeutic Exercise         Bike/Nu Step NuStep 5' NuStep 5'  NuStep 5' NuStep 5' NuStep 5' NuStep 5'   Hip 3 ways  On foam 10x B PTB 10x PTB 10x B PTB 12x B 2x10 B 2x10 B   LAQ/SAQ         Leg press DL 55# lvl 5 2x10 DL 50#2x12 end " "range  DL 55# 2x12 end range  DL 55# 2x12 end range  DL 55# 2x10   Hip sags 2x10  2x10 2x10 2x10 2x10   Lumbar ext 2x10 30x  2x10 2x10 2x10 2x10            Neuromuscular Re-education         Bridge         SLR          Quad sets         Wobble boad Taps 30x  Balance 1' ea    Taps 20x  Balance 1' ea Taps 20x  Balance 1' ea   EO/EC  SLB firm 30\"x2  Tandem EC w/ foam 30\"x2 ea  Foam  FTEC  Tandem EO foam  30''x2 ea SLB firm 30\"x2  FTEC w/ foam 30\"x2 Foam  FTEC  Tandem EO  30''x2 ea Foam  FTEC  Tandem EC firm  30''x2 ea   Tandem walking NV   w/ hurdles+foam 2 laps  Foam beams 3 laps   Step ups Fwd 6\" 2x10 ea  Lat 6\" 10x L, 4\" 2x10 R 4\" with TKE  4\" w/ TKE 2x10 B 6\" w/ TKE 10x B Fwd 6\" 2x10 ea  Lat 4\" 10x ea Fwd 6\" 2x10 ea  Lat 4\" 2x10x ea   Step downs         Sidestepping PTB 3 laps  PTB 3 laps PTB 3 laps    w/ hurdles+foam 2 laps PTB 2 laps PTB 2 laps   HR/TR 2x10 ea  2x10 ea 2x10 ea     Therapeutic Activities         Education          STS No HHA 2x5  No HHA 10x No HHA 2x5     Backwards walking   15# 10x 15# 10x 16# 10x     Resisted fwd walking @ [place]   8# 10x 9# 10x              Modalities                              "

## 2024-12-13 ENCOUNTER — EVALUATION (OUTPATIENT)
Dept: PHYSICAL THERAPY | Facility: CLINIC | Age: 74
End: 2024-12-13
Payer: MEDICARE

## 2024-12-13 DIAGNOSIS — M25.561 ACUTE PAIN OF RIGHT KNEE: ICD-10-CM

## 2024-12-13 DIAGNOSIS — R26.89 BALANCE PROBLEMS: Primary | ICD-10-CM

## 2024-12-13 PROCEDURE — 97112 NEUROMUSCULAR REEDUCATION: CPT

## 2024-12-13 PROCEDURE — 97110 THERAPEUTIC EXERCISES: CPT

## 2024-12-13 PROCEDURE — 97140 MANUAL THERAPY 1/> REGIONS: CPT

## 2024-12-13 NOTE — PROGRESS NOTES
PT Evaluation     Today's date: 2024  Patient name: Shraddha Araiza  : 1950  MRN: [id number]  Referring provider: Evens Ambrose MD  Dx:   Encounter Diagnosis     ICD-10-CM    1. Balance problems  R26.89       2. Acute pain of right knee  M25.561                      Assessment  Impairments: abnormal coordination, abnormal gait, abnormal muscle firing, abnormal movement, activity intolerance, impaired balance, impaired physical strength, lacks appropriate home exercise program and safety issue    Assessment details: RE today/date: 2024 Patient presents for re-evaluation after participating in physical therapy. At this point they have been compliant with HEP and PT to this date and has made progress with ROM and strength and overall pain to this date. At this point they are still lacking strength and balance. They will continue to benefit from skilled PT to continue to address remaining impairments and progress toward optimal function and improve quality of life.     IE:Shraddha Araiza is a pleasant 74 y.o. female presents with signs and symptoms consistent with:   Balance    Problem List:  1)   2)    she has lack of terminal knee extension and decreased quad strength due to knee swelling which is affecting her balance and gait, and decreased functional outcomes resulting in worry over not knowing what's wrong and fear of not being able to keep active. These impairments listed above are preventing the patient from participating in functional activity. At this time patient will benefit from No further referral appears necessary at this time based upon examination results, and is negative for any red flags. Prognosis is good given HEP compliance and attendance to physical therapy 2 x a week.  Positive prognostic indicators include positive attitude toward recovery and good understanding of diagnosis and treatment plan options.  Negative prognostic indicators include chronicity of symptoms.   Patent will benefit from skilled physical therapy at this time to address deficits to improve overall function and return to PLOF. Patient verbalized understanding of POC, HEP, and return demonstrated HEP. All questions were answered to patients satisfaction.     Please contact me if you have any questions or recommendations. Thank you for the referral and the opportunity to share in Shraddha Araiza's care.    Comparable signs:  1)  2)   Understanding of Dx/Px/POC: good     Prognosis: good    Goals  Impairment Goals 4-6 weeks Progressing 12/13  In order to improve and maximize function patient will be able to...  - Improve overall strength in both LE and UE to 5/5 to allow for good reactive strategies and to get up off the floor  - Demonstrate McTSIB within age related norms  - Demonstrate at least 90 on ABC scale          Functional Goals 6-8 weeks Progressing 12/13  In order to improve and maximize function patient will be able to...  - Increase Functional Status Measure (FOTO) to predicted outcome measures  - Be independent and compliant with HEP   - Participate in ADL/IADLS without feeling unsteady or LOB  - Decrease TUG and 5xSTS to below 13 seconds to demonstrate less risk of falls by DC  - Report no instances of falls since beginning PT  - Ambulate on even and uneven surfaces safely without an AD.   - Ambulate in a straight line with head turns without gait deviation.           Plan  Patient would benefit from: skilled PT    Planned therapy interventions: neuromuscular re-education, patient education, strengthening, stretching, therapeutic activities, therapeutic exercise, home exercise program, functional ROM exercises, postural training, gait training and transfer training    Frequency: 2-3x week.  Duration in weeks: 6  Treatment plan discussed with: patient      Subjective Evaluation    History of Present Illness  Mechanism of injury: RE Patient reports improvements in overall balance since starting  PT, and notes slight improvements in R knee and hip sxs, but still has difficulties with certain ADLs secondary to pain and tightness. Patient notes that she was away this past week and noticed some improvements for the first time with being able to ascend stairs without significant onset R knee pain with increased knee flexion. Patient notes having an episode of R hip exacerbations and performed hip sags at the hotel which abolished noted sxs after these repeated motions. She reports 50% improved    iE:Patient presents with balance concerns to this date. She states that it has been getting progressively worse. She reports that she does not utilize an AD. When she walks she does bring a walking stick with her. She reports a fall in  and . She was walking down the street and fell and in October she was walking down the street at [place]. She stated that if she does fall she needs assistance to get up off the floor. She reported her last fall she fell straight on her knees and her R knee is still bothering her, and reports it as a dull ache.     Difficulty with: steps, walking, uneven surfaces.   Patient Goals  Patient goals for therapy: decreased pain, independence with ADLs/IADLs and return to sport/leisure activities  Patient goal: not fallign down anymore (met)  Pain  Current pain ratin  At worst pain ratin  Location: r knee          Objective     Functional Assessment        Comments  5x STS:22  TUG:10s   SLS:R:3 L:3   McTSIB  EO::NV                EC::NV  EOF:NV                  ECF:NV    MMT:  L Hip ER:4/5                                R Hip ER:4/5  L Hip IR:4/5                                     R Hip IR:4/5  L Hip flex:4/5                                 R Hip flex:4/5  L Knee ext:4+/5                                 R knee ext:4/5  L Knee flex:4/5                               R knee flex:4/5  L DF:5/5                                           R DF:5/5  L PF:5/5                       "                     R PF:5/5        General Comments:      Knee Comments  Lack of terminal knee extension on Rknee  -TTP along quad and patella tendon slight edema noted, negative valgus, varus, brandy, posterior drawer, anterior drawer              POC Expires Auth Status Start Date Expiration Date PT Visit Limit    12/6 11/6 BOMN   Date 12/6       Used        Remaining           Diagnosis:  Balance/Deconditioning   Precautions:  See chart   Comparable signs 1) Walking  2)    Primary Impairments: 1) Quad activation  2) Decreased sensation   Patient Goals Improve balance   Manual Therapy 11/25 11/27 12/2 12/4 12/13 11/21                                       Re-evaluation          Exercise Diary          Therapeutic Exercise         Bike/Nu Step NuStep 5' NuStep 5'  NuStep 5' NuStep 5' 5'  NuStep 5'   Hip 3 ways  On foam 10x B PTB 10x PTB 10x B PTB 12x B GTB 12x  2x10 B   LAQ/SAQ         Leg press DL 55# lvl 5 2x10 DL 50#2x12 end range  DL 55# 2x12 end range  DL 55# 2x12 end range 60# 2x12 DL 55# 2x10   Hip sags 2x10  2x10 2x10  2x10   Lumbar ext 2x10 30x  2x10 2x10  2x10            Neuromuscular Re-education         Bridge         SLR          Quad sets         Wobble boad Taps 30x  Balance 1' ea     Taps 20x  Balance 1' ea   EO/EC  SLB firm 30\"x2  Tandem EC w/ foam 30\"x2 ea  Foam  FTEC  Tandem EO foam  30''x2 ea SLB firm 30\"x2  FTEC w/ foam 30\"x2  Foam  FTEC  Tandem EC firm  30''x2 ea   Tandem walking NV   w/ hurdles+foam 2 laps  Foam beams 3 laps   Step ups Fwd 6\" 2x10 ea  Lat 6\" 10x L, 4\" 2x10 R 4\" with TKE  4\" w/ TKE 2x10 B 6\" w/ TKE 10x B  Fwd 6\" 2x10 ea  Lat 4\" 2x10x ea   Step downs         Sidestepping PTB 3 laps  PTB 3 laps PTB 3 laps    w/ hurdles+foam 2 laps GTB 3 PTB 2 laps   HR/TR 2x10 ea  2x10 ea 2x10 ea 2x10    Therapeutic Activities         Education          STS No HHA 2x5  No HHA 10x No HHA 2x5     Backwards walking   15# 10x 15# 10x 16# 10x     Resisted fwd walking @ [facility]   8# 10x 9# 10x   "            Modalities

## 2024-12-16 ENCOUNTER — OFFICE VISIT (OUTPATIENT)
Dept: PHYSICAL THERAPY | Facility: CLINIC | Age: 74
End: 2024-12-16
Payer: MEDICARE

## 2024-12-16 DIAGNOSIS — M25.561 ACUTE PAIN OF RIGHT KNEE: ICD-10-CM

## 2024-12-16 DIAGNOSIS — R26.89 BALANCE PROBLEMS: Primary | ICD-10-CM

## 2024-12-16 PROCEDURE — 97530 THERAPEUTIC ACTIVITIES: CPT

## 2024-12-16 PROCEDURE — 97110 THERAPEUTIC EXERCISES: CPT

## 2024-12-16 PROCEDURE — 97112 NEUROMUSCULAR REEDUCATION: CPT

## 2024-12-16 NOTE — PROGRESS NOTES
Daily Note     Today's date: 2024  Patient name: Shraddha Araiza  : 1950  MRN: [id number]  Referring provider: Evens Ambrose MD  Dx:   Encounter Diagnosis     ICD-10-CM    1. Balance problems  R26.89       2. Acute pain of right knee  M25.561           Start Time: 1000  Stop Time: 1040  Total time in clinic (min): 40 minutes    Subjective: Patient reports not feeling too sore after last session, noting improvements overall with balance and B LE strength, but has some discomfort from time to time when being on her feet for a while.      Objective: See treatment diary below      Assessment: Tolerated treatment well. Improvements demonstrated in patient's ability to maintain her center of balance when on an uneven surface and was able to complete exercise with decrease HHA t/o session without significant LOB throughout. Increased reps/resistance as noted in flowsheet below, with mild increases in R>L knee discomfort, but still was tolerable overall to complete desired reps. Patient demonstrated good quad activation and stability during STS with  addition of KB today, with some discomfort experienced afterwards, which was relieved by repeated hip sags. Continue to progress as able. Patient would benefit from continued PT      Plan: Continue per plan of care.          POC Expires Auth Status Start Date Expiration Date PT Visit Limit     BOMN   Date        Used        Remaining           Diagnosis:  Balance/Deconditioning   Precautions:  See chart   Comparable signs 1) Walking  2)    Primary Impairments: 1) Quad activation  2) Decreased sensation   Patient Goals Improve balance   Manual Therapy                                        Re-evaluation          Exercise Diary          Therapeutic Exercise         Bike/Nu Step NuStep 5' NuStep 5'  NuStep 5' NuStep 5' 5'  NuStep 5'   Hip 3 ways  On foam 10x B PTB 10x PTB 10x B PTB 12x B GTB 12x  GTB 15x B   LAQ/SAQ   "       Leg press DL 55# lvl 5 2x10 DL 50#2x12 end range  DL 55# 2x12 end range  DL 55# 2x12 end range 60# 2x12 60# 2x12   Hip sags 2x10  2x10 2x10  2x10   Lumbar ext 2x10 30x  2x10 2x10              Neuromuscular Re-education         Bridge         SLR          Quad sets         Wobble boad Taps 30x  Balance 1' ea        EO/EC  SLB firm 30\"x2  Tandem EC w/ foam 30\"x2 ea  Foam  FTEC  Tandem EO foam  30''x2 ea SLB firm 30\"x2  FTEC w/ foam 30\"x2  Foam  FTEC  Tandem EC firm  30''x2 ea   Tandem walking NV   w/ hurdles+foam 2 laps     Step ups Fwd 6\" 2x10 ea  Lat 6\" 10x L, 4\" 2x10 R 4\" with TKE  4\" w/ TKE 2x10 B 6\" w/ TKE 10x B  Fwd 6\" 2x10 ea  Lat 6\" 10x ea   Step downs         Sidestepping PTB 3 laps  PTB 3 laps PTB 3 laps    w/ hurdles+foam 2 laps GTB 3 GTB 3 laps   HR/TR 2x10 ea  2x10 ea 2x10 ea 2x10 2x10 ea   Therapeutic Activities         Education          STS No HHA 2x5  No HHA 10x No HHA 2x5     Backwards walking   15# 10x 15# 10x 16# 10x  16.5# 10x   Resisted fwd walking @ [facility]   8# 10x 9# 10x  10# 10x            Modalities                            "

## 2024-12-19 ENCOUNTER — OFFICE VISIT (OUTPATIENT)
Dept: PHYSICAL THERAPY | Facility: CLINIC | Age: 74
End: 2024-12-19
Payer: MEDICARE

## 2024-12-19 DIAGNOSIS — R26.89 BALANCE PROBLEMS: Primary | ICD-10-CM

## 2024-12-19 DIAGNOSIS — M25.561 ACUTE PAIN OF RIGHT KNEE: ICD-10-CM

## 2024-12-19 PROCEDURE — 97112 NEUROMUSCULAR REEDUCATION: CPT

## 2024-12-19 PROCEDURE — 97110 THERAPEUTIC EXERCISES: CPT

## 2024-12-19 NOTE — PROGRESS NOTES
Daily Note     Today's date: 2024  Patient name: Shraddha Araiza  : 1950  MRN: [id number]  Referring provider: Evens Ambrose MD  Dx:   Encounter Diagnosis     ICD-10-CM    1. Balance problems  R26.89       2. Acute pain of right knee  M25.561             Start Time: 1630  Stop Time: 1700  Total time in clinic (min): 30 minutes    Subjective: Patient reports having one of the worst nights last night in regards to increased sxs and pain, noting R patella pain that went down into her calf and was not relieved by medication, ice, or anything she tried.      Objective: See treatment diary below      Assessment: Tolerated treatment well. Improvements demonstrated in patient's ability to maintain her center of balance when on an uneven surface and was able to complete exercise with decrease HHA t/o session without significant LOB throughout. Increased reps/resistance as noted in flowsheet below, with mild increases in R>L knee discomfort, but still was tolerable overall to complete desired reps. Continue to progress as able. Patient would benefit from continued PT      Plan: Continue per plan of care.          POC Expires Auth Status Start Date Expiration Date PT Visit Limit     BOMN   Date        Used        Remaining           Diagnosis:  Balance/Deconditioning   Precautions:  See chart   Comparable signs 1) Walking  2)    Primary Impairments: 1) Quad activation  2) Decreased sensation   Patient Goals Improve balance   Manual Therapy                                        Re-evaluation          Exercise Diary          Therapeutic Exercise         Bike/Nu Step NuStep 5' NuStep 5'  NuStep 5' NuStep 5' 5'  NuStep 5'   Hip 3 ways  PTB 15x PTB 10x PTB 10x B PTB 12x B GTB 12x  GTB 15x B   LAQ/SAQ         Leg press  DL 50#2x12 end range  DL 55# 2x12 end range  DL 55# 2x12 end range 60# 2x12 60# 2x12   Hip sags 2x10  2x10 2x10  2x10   Lumbar ext 2x10 30x  2x10 2x10  "             Neuromuscular Re-education         Bridge         SLR          Quad sets         Wobble boad         EO/EC  SLB firm 30\"x2  Foam  FTEC  Tandem EO foam  30''x2 ea SLB firm 30\"x2  FTEC w/ foam 30\"x2  Foam  FTEC  Tandem EC firm  30''x2 ea   Tandem walking    w/ hurdles+foam 2 laps     Step ups  4\" with TKE  4\" w/ TKE 2x10 B 6\" w/ TKE 10x B  Fwd 6\" 2x10 ea  Lat 6\" 10x ea   Step downs         Sidestepping PTB 3 laps  PTB 3 laps PTB 3 laps    w/ hurdles+foam 2 laps GTB 3 GTB 3 laps   HR/TR 2x10  2x10 ea 2x10 ea 2x10 2x10 ea   Therapeutic Activities         Education          STS   No HHA 10x No HHA 2x5     Backwards walking  16# 10x 15# 10x 15# 10x 16# 10x  16.5# 10x   Resisted fwd walking @ [facility] 9# 10x  8# 10x 9# 10x  10# 10x            Modalities                            "

## 2024-12-23 ENCOUNTER — OFFICE VISIT (OUTPATIENT)
Dept: PHYSICAL THERAPY | Facility: CLINIC | Age: 74
End: 2024-12-23
Payer: MEDICARE

## 2024-12-23 DIAGNOSIS — R26.89 BALANCE PROBLEMS: ICD-10-CM

## 2024-12-23 DIAGNOSIS — M25.561 ACUTE PAIN OF RIGHT KNEE: Primary | ICD-10-CM

## 2024-12-23 PROCEDURE — 97112 NEUROMUSCULAR REEDUCATION: CPT

## 2024-12-23 PROCEDURE — 97530 THERAPEUTIC ACTIVITIES: CPT

## 2024-12-23 PROCEDURE — 97110 THERAPEUTIC EXERCISES: CPT

## 2024-12-23 NOTE — PROGRESS NOTES
"Daily Note     Today's date: 2024  Patient name: Shraddha Araiza  : 1950  MRN: [id number]  Referring provider: Evens Ambrose MD  Dx:   Encounter Diagnosis     ICD-10-CM    1. Acute pain of right knee  M25.561       2. Balance problems  R26.89                        Subjective: Patient reports feeling okay to this date.      Objective: See treatment diary below      Assessment: Tolerated treatment well. Patient demonstrates weakness in hip flexor to this date and difficulty standing on single limb. She demonstrates use of upper extremity for balance to this date. Challenged in single leg press to this date especially on R side. Patient would benefit from continued PT      Plan: Continue per plan of care.          POC Expires Auth Status Start Date Expiration Date PT Visit Limit     BOMN   Date        Used        Remaining           Diagnosis:  Balance/Deconditioning   Precautions:  See chart   Comparable signs 1) Walking  2)    Primary Impairments: 1) Quad activation  2) Decreased sensation   Patient Goals Improve balance   Manual Therapy                                        Re-evaluation          Exercise Diary          Therapeutic Exercise         Bike/Nu Step NuStep 5' 5'  NuStep 5' NuStep 5' 5'  NuStep 5'   Hip 3 ways  PTB 15x GTB 10x B PTB 10x B PTB 12x B GTB 12x  GTB 15x B   LAQ/SAQ         Leg press  65# 2x12  SL 45# 12x ea DL 55# 2x12 end range  DL 55# 2x12 end range 60# 2x12 60# 2x12   Hip sags 2x10  2x10 2x10  2x10   Lumbar ext 2x10  2x10 2x10              Neuromuscular Re-education         Bridge         SLR          Quad sets         Wobble boad  2' ea       EO/EC  SLB firm 30\"x2  Foam  FTEC  Tandem EO foam  30''x2 ea SLB firm 30\"x2  FTEC w/ foam 30\"x2  Foam  FTEC  Tandem EC firm  30''x2 ea   Tandem walking    w/ hurdles+foam 2 laps     Step ups   4\" w/ TKE 2x10 B 6\" w/ TKE 10x B  Fwd 6\" 2x10 ea  Lat 6\" 10x ea   Step downs       "   Sidestepping PTB 3 laps GTB 3 laps  PTB 3 laps PTB 3 laps    w/ hurdles+foam 2 laps GTB 3 GTB 3 laps   HR/TR 2x10  2x10 ea 2x10 ea 2x10 2x10 ea   Therapeutic Activities         Education          STS   No HHA 10x No HHA 2x5     Backwards walking  16# 10x 17# 10x 15# 10x 16# 10x  16.5# 10x   Resisted fwd walking @ [place] 9# 10x 10# 10x  8# 10x 9# 10x  10# 10x            Modalities

## 2024-12-26 ENCOUNTER — APPOINTMENT (OUTPATIENT)
Dept: PHYSICAL THERAPY | Facility: CLINIC | Age: 74
End: 2024-12-26
Payer: MEDICARE

## 2024-12-30 ENCOUNTER — APPOINTMENT (OUTPATIENT)
Dept: PHYSICAL THERAPY | Facility: CLINIC | Age: 74
End: 2024-12-30
Payer: MEDICARE

## 2025-01-02 ENCOUNTER — APPOINTMENT (OUTPATIENT)
Dept: PHYSICAL THERAPY | Facility: CLINIC | Age: 75
End: 2025-01-02
Payer: MEDICARE

## 2025-01-02 ENCOUNTER — OFFICE VISIT (OUTPATIENT)
Dept: FAMILY MEDICINE CLINIC | Facility: CLINIC | Age: 75
End: 2025-01-02
Payer: MEDICARE

## 2025-01-02 VITALS
SYSTOLIC BLOOD PRESSURE: 120 MMHG | OXYGEN SATURATION: 96 % | RESPIRATION RATE: 16 BRPM | DIASTOLIC BLOOD PRESSURE: 70 MMHG | BODY MASS INDEX: 31.92 KG/M2 | WEIGHT: 187 LBS | HEIGHT: 64 IN | TEMPERATURE: 98 F | HEART RATE: 85 BPM

## 2025-01-02 DIAGNOSIS — J06.9 VIRAL UPPER RESPIRATORY TRACT INFECTION: ICD-10-CM

## 2025-01-02 DIAGNOSIS — H61.22 IMPACTED CERUMEN OF LEFT EAR: Primary | ICD-10-CM

## 2025-01-02 DIAGNOSIS — H91.92 DECREASED HEARING OF LEFT EAR: ICD-10-CM

## 2025-01-02 PROCEDURE — 69210 REMOVE IMPACTED EAR WAX UNI: CPT | Performed by: FAMILY MEDICINE

## 2025-01-02 PROCEDURE — 99214 OFFICE O/P EST MOD 30 MIN: CPT | Performed by: FAMILY MEDICINE

## 2025-01-02 NOTE — ASSESSMENT & PLAN NOTE
URI for about 7 to 10 days, she finished Augmentin and use the topical eardrops in the left ear given by the urgent care now she cannot hear from the left ear, on exam there was wax which is removed and now she feels better advised to use Flonase for nasal congestion

## 2025-01-02 NOTE — PROGRESS NOTES
Name: Shraddha Araiza      : 1950      MRN: [id number]  Encounter Provider: Carina Lafleur MD  Encounter Date: 2025   Encounter department: [facility]    Assessment & Plan  Impacted cerumen of left ear  Cerumen removed with irrigation in the left ear drum looks normal and she can hear much better now  Orders:  •  Ear cerumen removal    Decreased hearing of left ear  Hearing improved significantly after removing wax from the left ear  Orders:  •  Ear cerumen removal    Viral upper respiratory tract infection  URI for about 7 to 10 days, she finished Augmentin and use the topical eardrops in the left ear given by the urgent care now she cannot hear from the left ear, on exam there was wax which is removed and now she feels better advised to use Flonase for nasal congestion            History of Present Illness     Treated for left ear infection, sinusitis with Augmentin and antibiotic steroid drops for the left ear, and they removed a wax from the left ear, she says she cannot hear from the left ear, denies fever chills, still has nasal congestion and some cough      Ear cerumen removal    Date/Time: 2025 1:40 PM    Performed by: Carina Lafleur MD  Authorized by: Carina Lafleur MD  Universal Protocol:  Consent: Verbal consent obtained.  Consent given by: patient  Patient understanding: patient states understanding of the procedure being performed  Patient identity confirmed: verbally with patient    Patient location:  Clinic  Procedure details:     Location:  L ear    Procedure type: irrigation with instrumentation      Instrumentation: loop      Approach:  Natural orifice  Post-procedure details:     Complication:  None    Patient tolerance of procedure:  Tolerated well, no immediate complications      Review of Systems   Constitutional: Negative.    HENT:  Positive for congestion, hearing loss and postnasal drip. Negative for ear pain and sore throat.    Eyes: Negative.   "  Respiratory:  Positive for cough.    Cardiovascular: Negative.    Gastrointestinal: Negative.      Past Medical History:   Diagnosis Date   • Disease of thyroid gland    • Hyperlipidemia      Past Surgical History:   Procedure Laterality Date   • BREAST BIOPSY Right 04/26/2012   • COLONOSCOPY N/A 3/20/2019    Procedure: COLONOSCOPY;  Surgeon: Chris Mcfadden MD;  Location: [facility];  Service: Gastroenterology   • TONSILLECTOMY       Family History   Problem Relation Age of Onset   • No Known Problems Sister    • No Known Problems Daughter    • Bone cancer Maternal Grandmother    • Brain cancer Maternal Aunt    • No Known Problems Maternal Uncle    • No Known Problems Daughter      Social History     Tobacco Use   • Smoking status: Never   • Smokeless tobacco: Never   Vaping Use   • Vaping status: Never Used   Substance and Sexual Activity   • Alcohol use: Yes   • Drug use: Never   • Sexual activity: Not on file     Current Outpatient Medications on File Prior to Visit   Medication Sig   • ezetimibe (ZETIA) 10 mg tablet TAKE 1 TABLET BY MOUTH EVERY DAY   • hydrochlorothiazide (HYDRODIURIL) 25 mg tablet Take 1 tablet (25 mg total) by mouth daily   • levothyroxine 25 mcg tablet Take 1 tablet (25 mcg total) by mouth daily   • losartan (COZAAR) 25 mg tablet Take 1 tablet (25 mg total) by mouth daily   • simvastatin (ZOCOR) 40 mg tablet Take 1 tablet (40 mg total) by mouth daily at bedtime     Allergies   Allergen Reactions   • Nickel Rash     Immunization History   Administered Date(s) Administered   • COVID-19 PFIZER VACCINE 0.3 ML IM 03/09/2021, 03/29/2021   • COVID-19 Pfizer Vac BIVALENT Yoan-sucrose 12 Yr+ IM 12/07/2022   • INFLUENZA 09/09/2015   • Influenza Split High Dose Preservative Free IM 09/09/2015     Objective   /70   Pulse 85   Temp 98 °F (36.7 °C)   Resp 16   Ht 5' 4\" (1.626 m)   Wt 84.8 kg (187 lb)   SpO2 96%   BMI 32.10 kg/m²     Physical Exam  Vitals and nursing note reviewed. "   Constitutional:       Appearance: Normal appearance.   HENT:      Right Ear: Tympanic membrane normal.      Left Ear: Tympanic membrane normal. There is impacted cerumen.      Nose: Congestion present.      Mouth/Throat:      Mouth: Mucous membranes are moist.   Eyes:      Extraocular Movements: Extraocular movements intact.   Cardiovascular:      Rate and Rhythm: Normal rate.      Heart sounds: No murmur heard.  Pulmonary:      Effort: Pulmonary effort is normal.   Musculoskeletal:      Cervical back: Normal range of motion.

## 2025-01-02 NOTE — ASSESSMENT & PLAN NOTE
Hearing improved significantly after removing wax from the left ear  Orders:  •  Ear cerumen removal

## 2025-01-02 NOTE — ASSESSMENT & PLAN NOTE
Cerumen removed with irrigation in the left ear drum looks normal and she can hear much better now  Orders:  •  Ear cerumen removal

## 2025-01-02 NOTE — PROGRESS NOTES
"Daily Note     Today's date: 2025  Patient name: Shraddha Araiza  : 1950  MRN: [id number]  Referring provider: Evens Ambrose MD  Dx:   No diagnosis found.                   Subjective: Patient reports feeling okay to this date.      Objective: See treatment diary below      Assessment: Tolerated treatment well. Patient demonstrates weakness in hip flexor to this date and difficulty standing on single limb. She demonstrates use of upper extremity for balance to this date. Challenged in single leg press to this date especially on R side. Patient would benefit from continued PT      Plan: Continue per plan of care.          POC Expires Auth Status Start Date Expiration Date PT Visit Limit     BOMN   Date        Used        Remaining           Diagnosis:  Balance/Deconditioning   Precautions:  See chart   Comparable signs 1) Walking  2)    Primary Impairments: 1) Quad activation  2) Decreased sensation   Patient Goals Improve balance   Manual Therapy                                        Re-evaluation          Exercise Diary          Therapeutic Exercise         Bike/Nu Step NuStep 5' 5'  NuStep 5' NuStep 5' 5'  NuStep 5'   Hip 3 ways  PTB 15x GTB 10x B PTB 10x B PTB 12x B GTB 12x  GTB 15x B   LAQ/SAQ         Leg press  65# 2x12  SL 45# 12x ea DL 55# 2x12 end range  DL 55# 2x12 end range 60# 2x12 60# 2x12   Hip sags 2x10  2x10 2x10  2x10   Lumbar ext 2x10  2x10 2x10              Neuromuscular Re-education         Bridge         SLR          Quad sets         Wobble boad  2' ea       EO/EC  SLB firm 30\"x2  Foam  FTEC  Tandem EO foam  30''x2 ea SLB firm 30\"x2  FTEC w/ foam 30\"x2  Foam  FTEC  Tandem EC firm  30''x2 ea   Tandem walking    w/ hurdles+foam 2 laps     Step ups   4\" w/ TKE 2x10 B 6\" w/ TKE 10x B  Fwd 6\" 2x10 ea  Lat 6\" 10x ea   Step downs         Sidestepping PTB 3 laps GTB 3 laps  PTB 3 laps PTB 3 laps    w/ hurdles+foam 2 laps GTB 3 GTB 3 laps "   HR/TR 2x10  2x10 ea 2x10 ea 2x10 2x10 ea   Therapeutic Activities         Education          STS   No HHA 10x No HHA 2x5     Backwards walking  16# 10x 17# 10x 15# 10x 16# 10x  16.5# 10x   Resisted fwd walking @ [place] 9# 10x 10# 10x  8# 10x 9# 10x  10# 10x            Modalities

## 2025-01-07 ENCOUNTER — APPOINTMENT (OUTPATIENT)
Dept: PHYSICAL THERAPY | Facility: CLINIC | Age: 75
End: 2025-01-07
Payer: MEDICARE

## 2025-01-09 ENCOUNTER — APPOINTMENT (OUTPATIENT)
Dept: PHYSICAL THERAPY | Facility: CLINIC | Age: 75
End: 2025-01-09
Payer: MEDICARE

## 2025-01-12 DIAGNOSIS — I10 BENIGN ESSENTIAL HYPERTENSION: ICD-10-CM

## 2025-01-13 RX ORDER — HYDROCHLOROTHIAZIDE 25 MG/1
25 TABLET ORAL DAILY
Qty: 90 TABLET | Refills: 1 | Status: SHIPPED | OUTPATIENT
Start: 2025-01-13

## 2025-01-14 ENCOUNTER — APPOINTMENT (OUTPATIENT)
Dept: PHYSICAL THERAPY | Facility: CLINIC | Age: 75
End: 2025-01-14
Payer: MEDICARE

## 2025-01-14 DIAGNOSIS — E03.9 HYPOTHYROIDISM, UNSPECIFIED TYPE: ICD-10-CM

## 2025-01-14 DIAGNOSIS — E78.5 HYPERLIPIDEMIA, UNSPECIFIED HYPERLIPIDEMIA TYPE: ICD-10-CM

## 2025-01-14 RX ORDER — SIMVASTATIN 40 MG
40 TABLET ORAL
Qty: 90 TABLET | Refills: 1 | Status: SHIPPED | OUTPATIENT
Start: 2025-01-14

## 2025-01-14 RX ORDER — LEVOTHYROXINE SODIUM 25 UG/1
25 TABLET ORAL DAILY
Qty: 90 TABLET | Refills: 1 | Status: SHIPPED | OUTPATIENT
Start: 2025-01-14

## 2025-01-15 ENCOUNTER — EVALUATION (OUTPATIENT)
Dept: PHYSICAL THERAPY | Facility: CLINIC | Age: 75
End: 2025-01-15
Payer: MEDICARE

## 2025-01-15 DIAGNOSIS — M25.561 ACUTE PAIN OF RIGHT KNEE: Primary | ICD-10-CM

## 2025-01-15 DIAGNOSIS — R26.89 BALANCE PROBLEMS: ICD-10-CM

## 2025-01-15 PROCEDURE — 97140 MANUAL THERAPY 1/> REGIONS: CPT

## 2025-01-15 PROCEDURE — 97112 NEUROMUSCULAR REEDUCATION: CPT

## 2025-01-15 PROCEDURE — 97110 THERAPEUTIC EXERCISES: CPT

## 2025-01-15 NOTE — PROGRESS NOTES
PT Re-Evaluation     Today's date: 1/15/2025  Patient name: Shraddha Araiza  : 1950  MRN: [id number]  Referring provider: Evens Ambrose MD  Dx:   Encounter Diagnosis     ICD-10-CM    1. Acute pain of right knee  M25.561       2. Balance problems  R26.89                      Assessment  Impairments: abnormal coordination, abnormal gait, abnormal muscle firing, abnormal movement, activity intolerance, impaired balance, impaired physical strength, lacks appropriate home exercise program and safety issue    Assessment details: RE today/date: 1/15/2025 Patient presents for re-evaluation after participating in physical therapy. At this point they have been compliant with HEP and PT to this date and has made progression, however since being sick she has regressed a bit with function and overall strength. Patient will benefit from skilled PT to this date to address fall risk and other functional outcome measures in order to regain strength and function and improve quality of life.     RE today/date: 2024 Patient presents for re-evaluation after participating in physical therapy. At this point they have been compliant with HEP and PT to this date and has made progress with ROM and strength and overall pain to this date. At this point they are still lacking strength and balance. They will continue to benefit from skilled PT to continue to address remaining impairments and progress toward optimal function and improve quality of life.     IE:Shraddha Araiza is a pleasant 74 y.o. female presents with signs and symptoms consistent with:   Balance    Problem List:  1)   2)    she has lack of terminal knee extension and decreased quad strength due to knee swelling which is affecting her balance and gait, and decreased functional outcomes resulting in worry over not knowing what's wrong and fear of not being able to keep active. These impairments listed above are preventing the patient from participating in  functional activity. At this time patient will benefit from No further referral appears necessary at this time based upon examination results, and is negative for any red flags. Prognosis is good given HEP compliance and attendance to physical therapy 2 x a week.  Positive prognostic indicators include positive attitude toward recovery and good understanding of diagnosis and treatment plan options.  Negative prognostic indicators include chronicity of symptoms.  Patent will benefit from skilled physical therapy at this time to address deficits to improve overall function and return to PLOF. Patient verbalized understanding of POC, HEP, and return demonstrated HEP. All questions were answered to patients satisfaction.     Please contact me if you have any questions or recommendations. Thank you for the referral and the opportunity to share in Shraddha Araiza's care.    Comparable signs:  1)  2)   Understanding of Dx/Px/POC: good     Prognosis: good    Goals  Impairment Goals 4-6 weeks Progressing 1/15/25  In order to improve and maximize function patient will be able to...  - Improve overall strength in both LE and UE to 5/5 to allow for good reactive strategies and to get up off the floor  - Demonstrate McTSIB within age related norms  - Demonstrate at least 90 on ABC scale          Functional Goals 6-8 weeks Progressing 1/15/25  In order to improve and maximize function patient will be able to...  - Increase Functional Status Measure (FOTO) to predicted outcome measures  - Be independent and compliant with HEP   - Participate in ADL/IADLS without feeling unsteady or LOB  - Decrease TUG and 5xSTS to below 13 seconds to demonstrate less risk of falls by DC  - Report no instances of falls since beginning PT  - Ambulate on even and uneven surfaces safely without an AD.   - Ambulate in a straight line with head turns without gait deviation.           Plan  Patient would benefit from: skilled PT    Planned therapy  interventions: neuromuscular re-education, patient education, strengthening, stretching, therapeutic activities, therapeutic exercise, home exercise program, functional ROM exercises, postural training, gait training and transfer training    Frequency: 2-3x week.  Duration in weeks: 6  Treatment plan discussed with: patient      Subjective Evaluation    History of Present Illness  Mechanism of injury: RE Patient reprorts improvements in overall balance. She stated prior to getting sick she was feeling pretty good. She had some upper respiratory infection for a couple weeks and since then feels that she has gotten back to square one since being sick. She states that going up and down stairs are harder now, since she has not done anything in 3 weeks.     RE Patient reports improvements in overall balance since starting PT, and notes slight improvements in R knee and hip sxs, but still has difficulties with certain ADLs secondary to pain and tightness. Patient notes that she was away this past week and noticed some improvements for the first time with being able to ascend stairs without significant onset R knee pain with increased knee flexion. Patient notes having an episode of R hip exacerbations and performed hip sags at the hotel which abolished noted sxs after these repeated motions. She reports 50% improved    iE:Patient presents with balance concerns to this date. She states that it has been getting progressively worse. She reports that she does not utilize an AD. When she walks she does bring a walking stick with her. She reports a fall in June and October 6. She was walking down the street and fell and in October she was walking down the street at [place]. She stated that if she does fall she needs assistance to get up off the floor. She reported her last fall she fell straight on her knees and her R knee is still bothering her, and reports it as a dull ache.     Difficulty with: steps, walking, uneven  surfaces.   Patient Goals  Patient goals for therapy: decreased pain, independence with ADLs/IADLs and return to sport/leisure activities  Patient goal: not fallign down anymore  Pain  Current pain ratin  At worst pain ratin  Location: r knee        Objective     Functional Assessment        Comments  5x STS:28   TUG:15s   SLS:R:3 L:3   McTSIB  EO::NV                EC::NV  EOF:NV                  ECF:NV    MMT:  L Hip ER:4/5                                R Hip ER:4/5  L Hip IR:4/5                                     R Hip IR:4/5  L Hip flex:4/5                                 R Hip flex:4/5  L Knee ext:4+/5                                 R knee ext:4/5  L Knee flex:4/5                               R knee flex:4/5  L DF:5/5                                           R DF:5/5  L PF:5/5                                           R PF:5/5        General Comments:      Knee Comments  Lack of terminal knee extension on Rknee  -TTP along quad and patella tendon slight edema noted, negative valgus, varus, brandy, posterior drawer, anterior drawer              POC Expires Auth Status Start Date Expiration Date PT Visit Limit     BOMN   Date        Used        Remaining           Diagnosis:  Balance/Deconditioning   Precautions:  See chart   Comparable signs 1) Walking  2)    Primary Impairments: 1) Quad activation  2) Decreased sensation   Patient Goals Improve balance   Manual Therapy 12/19 12/23 1/15 12/4 12/13 12/16                                       Re-evaluation    SP      Exercise Diary          Therapeutic Exercise         Bike/Nu Step NuStep 5' 5'  5'  NuStep 5' 5'  NuStep 5'   Hip 3 ways  PTB 15x GTB 10x B GTB 10x PTB 12x B GTB 12x  GTB 15x B   LAQ/SAQ         Leg press  65# 2x12  SL 45# 12x ea 55# 2x12  35# 2x12 DL 55# 2x12 end range 60# 2x12 60# 2x12   Hip sags 2x10   2x10  2x10   Lumbar ext 2x10   2x10              Neuromuscular Re-education         Bridge         SLR          Quad  "sets         Wobble boad  2' ea 2' ea      EO/EC  SLB firm 30\"x2   SLB firm 30\"x2  FTEC w/ foam 30\"x2  Foam  FTEC  Tandem EC firm  30''x2 ea   Tandem walking    w/ hurdles+foam 2 laps     Step ups    6\" w/ TKE 10x B  Fwd 6\" 2x10 ea  Lat 6\" 10x ea   Step downs         Sidestepping PTB 3 laps GTB 3 laps  GTB 3 laps  PTB 3 laps    w/ hurdles+foam 2 laps GTB 3 GTB 3 laps   HR/TR 2x10   2x10 ea 2x10 2x10 ea   Therapeutic Activities         Education          STS    No HHA 2x5     Backwards walking  16# 10x 17# 10x  16# 10x  16.5# 10x   Resisted fwd walking @ [place] 9# 10x 10# 10x   9# 10x  10# 10x            Modalities                              "

## 2025-01-16 ENCOUNTER — OFFICE VISIT (OUTPATIENT)
Dept: PHYSICAL THERAPY | Facility: CLINIC | Age: 75
End: 2025-01-16
Payer: MEDICARE

## 2025-01-16 DIAGNOSIS — M25.561 ACUTE PAIN OF RIGHT KNEE: Primary | ICD-10-CM

## 2025-01-16 DIAGNOSIS — R26.89 BALANCE PROBLEMS: ICD-10-CM

## 2025-01-16 PROCEDURE — 97110 THERAPEUTIC EXERCISES: CPT

## 2025-01-16 PROCEDURE — 97112 NEUROMUSCULAR REEDUCATION: CPT

## 2025-01-16 NOTE — PROGRESS NOTES
Daily Note     Today's date: 2025  Patient name: Shraddha Araiza  : 1950  MRN: [id number]  Referring provider: Evens Ambrose MD  Dx:   Encounter Diagnosis     ICD-10-CM    1. Acute pain of right knee  M25.561       2. Balance problems  R26.89           Start Time: 0900  Stop Time: 0940  Total time in clinic (min): 40 minutes      Subjective: Patient reports no new complaints or major changes since yesterday's session and notes no significant soreness after having back to back PT sessions this week.      Objective: See treatment diary below      Assessment: Tolerated treatment well. Moderate improvements demonstrated in patient's ability to maintain her center of balance when on an uneven surface and was able to complete all balance exercises with only fingertip assist today and without any unsteadiness or instability t/o. Modified SL leg press by decreasing weight since previous weight was too difficult for patient today. Try increasing weight NV if tolerable for patient. Continue to progress as able. Patient would benefit from continued PT to promote improvements in balance/gait deficits in order to maximize overall functional ability.      Plan: Continue per plan of care.          POC Expires Auth Status Start Date Expiration Date PT Visit Limit     BOMN   Date        Used        Remaining           Diagnosis:  Balance/Deconditioning   Precautions:  See chart   Comparable signs 1) Walking  2)    Primary Impairments: 1) Quad activation  2) Decreased sensation   Patient Goals Improve balance   Manual Therapy 12/19 12/23 1/15 1/16 12/13 12/16                                       Re-evaluation    SP      Exercise Diary          Therapeutic Exercise         Bike/Nu Step NuStep 5' 5'  5'  NuStep 5' 5'  NuStep 5'   Hip 3 ways  PTB 15x GTB 10x B GTB 10x GTB 15x ea GTB 12x  GTB 15x B   LAQ/SAQ         Leg press  65# 2x12  SL 45# 12x ea 55# 2x12  35# 2x12 55# 3x10  25# 2x10 60# 2x12 60# 2x12  "  Hip sags 2x10     2x10   Lumbar ext 2x10                 Neuromuscular Re-education         Bridge         SLR          Quad sets         Wobble boad  2' ea 2' ea Balance 2 min ea     EO/EC  SLB firm 30\"x2   SLB on foam 30\"x2 ea  Foam  FTEC  Tandem EC firm  30''x2 ea   Tandem walking    Tandem balance on foam 30\"x2 abel     Step ups      Fwd 6\" 2x10 ea  Lat 6\" 10x ea   Step downs         Sidestepping PTB 3 laps GTB 3 laps  GTB 3 laps  GTB 3 laps GTB 3 GTB 3 laps   HR/TR 2x10    2x10 2x10 ea   Therapeutic Activities         Education          STS         Backwards walking  16# 10x 17# 10x  18# 10x  16.5# 10x   Resisted fwd walking @ [place] 9# 10x 10# 10x     10# 10x            Modalities                            "

## 2025-01-21 ENCOUNTER — OFFICE VISIT (OUTPATIENT)
Dept: PHYSICAL THERAPY | Facility: CLINIC | Age: 75
End: 2025-01-21
Payer: MEDICARE

## 2025-01-21 DIAGNOSIS — M25.561 ACUTE PAIN OF RIGHT KNEE: Primary | ICD-10-CM

## 2025-01-21 DIAGNOSIS — R26.89 BALANCE PROBLEMS: ICD-10-CM

## 2025-01-21 PROCEDURE — 97112 NEUROMUSCULAR REEDUCATION: CPT

## 2025-01-21 PROCEDURE — 97110 THERAPEUTIC EXERCISES: CPT

## 2025-01-21 NOTE — PROGRESS NOTES
Daily Note     Today's date: 2025  Patient name: Shraddha Araiza  : 1950  MRN: [id number]  Referring provider: Evens Ambrose MD  Dx:   Encounter Diagnosis     ICD-10-CM    1. Acute pain of right knee  M25.561       2. Balance problems  R26.89             Start Time: 915  Stop Time: 955  Total time in clinic (min): 40 minutes      Subjective: Patient reports no new complaints or major changes since yesterday's session and notes no significant soreness after last session.      Objective: See treatment diary below      Assessment: Tolerated treatment well. Patient continued to demonstrate a steady and stable gait and stance when on an uneven surface during static and dynamic balance exercises today and was able to complete with only fingertip assist. Increased weight during SL leg press which was more challenging and was only able to perform 15 reps on R LE due to onset of knee pain, but was able to complete 20 reps on L LE without issue. Minimal cueing required to maintain a slow and controlled walking pace during resisted fwd and retro walking today to ensure proper muscle activation and ability for B LE to maintain eccentric control. Continue to progress as able. Patient would benefit from continued PT to promote improvements in balance/gait deficits in order to maximize overall functional ability.      Plan: Continue per plan of care.          POC Expires Auth Status Start Date Expiration Date PT Visit Limit     BOMN   Date        Used        Remaining           Diagnosis:  Balance/Deconditioning   Precautions:  See chart   Comparable signs 1) Walking  2)    Primary Impairments: 1) Quad activation  2) Decreased sensation   Patient Goals Improve balance   Manual Therapy 12/19 12/23 1/15 1/16 1/21 12/16                                       Re-evaluation    SP      Exercise Diary          Therapeutic Exercise         Bike/Nu Step NuStep 5' 5'  5'  NuStep 5' Bike 5' NuStep 5'   Hip 3  "ways  PTB 15x GTB 10x B GTB 10x GTB 15x ea GTB 15x ea GTB 15x B   LAQ/SAQ         Leg press  65# 2x12  SL 45# 12x ea 55# 2x12  35# 2x12 55# 3x10  25# 2x10 55# 3x10  30# 2x10 60# 2x12   Hip sags 2x10     2x10   Lumbar ext 2x10                 Neuromuscular Re-education         Bridge         SLR          Quad sets         Wobble boad  2' ea 2' ea Balance 2 min ea Balance 2 min ea    EO/EC  SLB firm 30\"x2   SLB on foam 30\"x2 ea SLB on foam 30\"x2 ea Foam  FTEC  Tandem EC firm  30''x2 ea   Tandem walking    Tandem balance on foam 30\"x2 abel On foam beams w/ hurdles 3 laps    Step ups      Fwd 6\" 2x10 ea  Lat 6\" 10x ea   Step downs         Sidestepping PTB 3 laps GTB 3 laps  GTB 3 laps   GTB 3 laps  On foam beams w/ hurdles 3 laps GTB 3 laps   HR/TR 2x10     2x10 ea   Therapeutic Activities         Education          STS         Backwards walking  16# 10x 17# 10x  18# 10x 18# 10x 16.5# 10x   Resisted fwd walking @ Josie 9# 10x 10# 10x    12# 10x 10# 10x            Modalities                            "

## 2025-01-23 ENCOUNTER — APPOINTMENT (OUTPATIENT)
Dept: PHYSICAL THERAPY | Facility: CLINIC | Age: 75
End: 2025-01-23
Payer: MEDICARE

## 2025-01-28 ENCOUNTER — APPOINTMENT (OUTPATIENT)
Dept: PHYSICAL THERAPY | Facility: CLINIC | Age: 75
End: 2025-01-28
Payer: MEDICARE

## 2025-01-30 ENCOUNTER — APPOINTMENT (OUTPATIENT)
Dept: PHYSICAL THERAPY | Facility: CLINIC | Age: 75
End: 2025-01-30
Payer: MEDICARE

## 2025-02-01 PROBLEM — H61.22 IMPACTED CERUMEN OF LEFT EAR: Status: RESOLVED | Noted: 2025-01-02 | Resolved: 2025-02-01

## 2025-02-01 PROBLEM — J06.9 VIRAL UPPER RESPIRATORY TRACT INFECTION: Status: RESOLVED | Noted: 2017-06-19 | Resolved: 2025-02-01

## 2025-02-12 ENCOUNTER — RA CDI HCC (OUTPATIENT)
Dept: OTHER | Facility: HOSPITAL | Age: 75
End: 2025-02-12

## 2025-02-13 ENCOUNTER — RESULTS FOLLOW-UP (OUTPATIENT)
Dept: FAMILY MEDICINE CLINIC | Facility: CLINIC | Age: 75
End: 2025-02-13

## 2025-02-13 ENCOUNTER — APPOINTMENT (OUTPATIENT)
Dept: LAB | Facility: CLINIC | Age: 75
End: 2025-02-13
Payer: MEDICARE

## 2025-02-13 DIAGNOSIS — E03.9 ACQUIRED HYPOTHYROIDISM: ICD-10-CM

## 2025-02-13 DIAGNOSIS — E78.5 HYPERLIPIDEMIA, UNSPECIFIED HYPERLIPIDEMIA TYPE: ICD-10-CM

## 2025-02-13 DIAGNOSIS — I10 BENIGN ESSENTIAL HYPERTENSION: ICD-10-CM

## 2025-02-13 LAB
ALBUMIN SERPL BCG-MCNC: 4.5 G/DL (ref 3.5–5)
ALP SERPL-CCNC: 45 U/L (ref 34–104)
ALT SERPL W P-5'-P-CCNC: 20 U/L (ref 7–52)
ANION GAP SERPL CALCULATED.3IONS-SCNC: 10 MMOL/L (ref 4–13)
AST SERPL W P-5'-P-CCNC: 17 U/L (ref 13–39)
BASOPHILS # BLD AUTO: 0.04 THOUSANDS/ÂΜL (ref 0–0.1)
BASOPHILS NFR BLD AUTO: 1 % (ref 0–1)
BILIRUB SERPL-MCNC: 0.66 MG/DL (ref 0.2–1)
BUN SERPL-MCNC: 18 MG/DL (ref 5–25)
CALCIUM SERPL-MCNC: 9.8 MG/DL (ref 8.4–10.2)
CHLORIDE SERPL-SCNC: 103 MMOL/L (ref 96–108)
CHOLEST SERPL-MCNC: 196 MG/DL (ref ?–200)
CO2 SERPL-SCNC: 28 MMOL/L (ref 21–32)
CREAT SERPL-MCNC: 0.81 MG/DL (ref 0.6–1.3)
EOSINOPHIL # BLD AUTO: 0.15 THOUSAND/ÂΜL (ref 0–0.61)
EOSINOPHIL NFR BLD AUTO: 3 % (ref 0–6)
ERYTHROCYTE [DISTWIDTH] IN BLOOD BY AUTOMATED COUNT: 12.7 % (ref 11.6–15.1)
GFR SERPL CREATININE-BSD FRML MDRD: 71 ML/MIN/1.73SQ M
GLUCOSE P FAST SERPL-MCNC: 98 MG/DL (ref 65–99)
HCT VFR BLD AUTO: 44.3 % (ref 34.8–46.1)
HDLC SERPL-MCNC: 67 MG/DL
HGB BLD-MCNC: 14.7 G/DL (ref 11.5–15.4)
IMM GRANULOCYTES # BLD AUTO: 0.01 THOUSAND/UL (ref 0–0.2)
IMM GRANULOCYTES NFR BLD AUTO: 0 % (ref 0–2)
LDLC SERPL CALC-MCNC: 113 MG/DL (ref 0–100)
LYMPHOCYTES # BLD AUTO: 1.31 THOUSANDS/ÂΜL (ref 0.6–4.47)
LYMPHOCYTES NFR BLD AUTO: 29 % (ref 14–44)
MCH RBC QN AUTO: 32.1 PG (ref 26.8–34.3)
MCHC RBC AUTO-ENTMCNC: 33.2 G/DL (ref 31.4–37.4)
MCV RBC AUTO: 97 FL (ref 82–98)
MONOCYTES # BLD AUTO: 0.59 THOUSAND/ÂΜL (ref 0.17–1.22)
MONOCYTES NFR BLD AUTO: 13 % (ref 4–12)
NEUTROPHILS # BLD AUTO: 2.39 THOUSANDS/ÂΜL (ref 1.85–7.62)
NEUTS SEG NFR BLD AUTO: 54 % (ref 43–75)
NONHDLC SERPL-MCNC: 129 MG/DL
NRBC BLD AUTO-RTO: 0 /100 WBCS
PLATELET # BLD AUTO: 219 THOUSANDS/UL (ref 149–390)
PMV BLD AUTO: 9.5 FL (ref 8.9–12.7)
POTASSIUM SERPL-SCNC: 4.2 MMOL/L (ref 3.5–5.3)
PROT SERPL-MCNC: 7.3 G/DL (ref 6.4–8.4)
RBC # BLD AUTO: 4.58 MILLION/UL (ref 3.81–5.12)
SODIUM SERPL-SCNC: 141 MMOL/L (ref 135–147)
TRIGL SERPL-MCNC: 81 MG/DL (ref ?–150)
TSH SERPL DL<=0.05 MIU/L-ACNC: 2.22 UIU/ML (ref 0.45–4.5)
WBC # BLD AUTO: 4.49 THOUSAND/UL (ref 4.31–10.16)

## 2025-02-13 PROCEDURE — 80061 LIPID PANEL: CPT

## 2025-02-13 PROCEDURE — 80053 COMPREHEN METABOLIC PANEL: CPT

## 2025-02-13 PROCEDURE — 85025 COMPLETE CBC W/AUTO DIFF WBC: CPT

## 2025-02-13 PROCEDURE — 36415 COLL VENOUS BLD VENIPUNCTURE: CPT

## 2025-02-13 PROCEDURE — 84443 ASSAY THYROID STIM HORMONE: CPT

## 2025-02-18 ENCOUNTER — OFFICE VISIT (OUTPATIENT)
Dept: FAMILY MEDICINE CLINIC | Facility: CLINIC | Age: 75
End: 2025-02-18
Payer: MEDICARE

## 2025-02-18 VITALS
HEIGHT: 64 IN | OXYGEN SATURATION: 98 % | WEIGHT: 188 LBS | RESPIRATION RATE: 16 BRPM | HEART RATE: 78 BPM | SYSTOLIC BLOOD PRESSURE: 130 MMHG | DIASTOLIC BLOOD PRESSURE: 76 MMHG | BODY MASS INDEX: 32.1 KG/M2

## 2025-02-18 DIAGNOSIS — Z12.31 ENCOUNTER FOR SCREENING MAMMOGRAM FOR BREAST CANCER: ICD-10-CM

## 2025-02-18 DIAGNOSIS — Z12.11 SCREEN FOR COLON CANCER: ICD-10-CM

## 2025-02-18 DIAGNOSIS — I10 BENIGN ESSENTIAL HYPERTENSION: Primary | ICD-10-CM

## 2025-02-18 DIAGNOSIS — E78.5 HYPERLIPIDEMIA, UNSPECIFIED HYPERLIPIDEMIA TYPE: ICD-10-CM

## 2025-02-18 DIAGNOSIS — E03.9 ACQUIRED HYPOTHYROIDISM: ICD-10-CM

## 2025-02-18 PROBLEM — R06.02 SOB (SHORTNESS OF BREATH) ON EXERTION: Status: RESOLVED | Noted: 2024-08-09 | Resolved: 2025-02-18

## 2025-02-18 PROCEDURE — 99214 OFFICE O/P EST MOD 30 MIN: CPT | Performed by: FAMILY MEDICINE

## 2025-02-18 PROCEDURE — G2211 COMPLEX E/M VISIT ADD ON: HCPCS | Performed by: FAMILY MEDICINE

## 2025-02-18 RX ORDER — ATORVASTATIN CALCIUM 40 MG/1
40 TABLET, FILM COATED ORAL DAILY
Qty: 100 TABLET | Refills: 2 | Status: SHIPPED | OUTPATIENT
Start: 2025-02-18

## 2025-02-18 NOTE — ASSESSMENT & PLAN NOTE
Blood pressure is stable, continue same medication  Orders:  •  CBC and differential; Future  •  Comprehensive metabolic panel; Future  •  Lipid panel; Future  •  TSH, 3rd generation; Future

## 2025-02-18 NOTE — ASSESSMENT & PLAN NOTE
Lustral increasing, change from simvastatin to Lipitor 40 mg same dose, she did not tolerate Zetia as she got GERD and constipation so she stopped taking that advised to have regular exercise take calcium and vitamin D regular basis and try to lose weight  Orders:  •  atorvastatin (LIPITOR) 40 mg tablet; Take 1 tablet (40 mg total) by mouth daily  •  CBC and differential; Future  •  Comprehensive metabolic panel; Future  •  Lipid panel; Future  •  TSH, 3rd generation; Future  •  psyllium (KONSYL) 33 % POWD; Take by mouth daily 1 tabspoon in glass of water daily

## 2025-02-18 NOTE — PROGRESS NOTES
Name: Shraddha Araiza      : 1950      MRN: [id number]  Encounter Provider: Carina Lafleur MD  Encounter Date: 2025   Encounter department: [facility]    Assessment & Plan  Benign essential hypertension  Blood pressure is stable, continue same medication  Orders:  •  CBC and differential; Future  •  Comprehensive metabolic panel; Future  •  Lipid panel; Future  •  TSH, 3rd generation; Future    Acquired hypothyroidism  VICK is normal, continue same dose of levothyroxine  Orders:  •  Comprehensive metabolic panel; Future  •  Lipid panel; Future    Hyperlipidemia, unspecified hyperlipidemia type  Lustral increasing, change from simvastatin to Lipitor 40 mg same dose, she did not tolerate Zetia as she got GERD and constipation so she stopped taking that advised to have regular exercise take calcium and vitamin D regular basis and try to lose weight  Orders:  •  atorvastatin (LIPITOR) 40 mg tablet; Take 1 tablet (40 mg total) by mouth daily  •  CBC and differential; Future  •  Comprehensive metabolic panel; Future  •  Lipid panel; Future  •  TSH, 3rd generation; Future  •  psyllium (KONSYL) 33 % POWD; Take by mouth daily 1 tabspoon in glass of water daily    Encounter for screening mammogram for breast cancer    Orders:  •  Mammo screening bilateral w 3d and cad; Future    Screen for colon cancer    Orders:  •  Cologuard  She does not want colonoscopy but agreed for Cologuard     Labs and follow-up 6 months  History of Present Illness     She says she did not take the ezetimibe which was given by cardiologist for hyperlipidemia as she had constipation and acid reflux with that medicine, she is still taking 40 mg simvastatin she is not very active just walks in the home, no regular exercise 90s the same food, she is not interested in vaccination and not interested in DEXA scan this year but she says she might go for mammogram and Cologuard but not the colonoscopy when she does not take  any vitamins and calcium/vitamin D      Review of Systems   Constitutional:  Negative for activity change, appetite change, chills, fatigue, fever and unexpected weight change.   HENT:  Negative for congestion, ear discharge, ear pain, nosebleeds, postnasal drip, rhinorrhea, sinus pressure, sneezing, sore throat, trouble swallowing and voice change.    Eyes:  Negative for photophobia, pain, discharge, redness and itching.   Respiratory:  Negative for cough, chest tightness, shortness of breath and wheezing.    Cardiovascular:  Negative for chest pain, palpitations and leg swelling.   Gastrointestinal:  Negative for abdominal pain, constipation, diarrhea, nausea and vomiting.   Endocrine: Negative for polyuria.   Genitourinary:  Negative for dysuria, frequency and urgency.   Musculoskeletal:  Negative for arthralgias, back pain, myalgias and neck pain.   Skin:  Negative for color change, pallor and rash.   Allergic/Immunologic: Negative for environmental allergies and food allergies.   Neurological:  Negative for dizziness, weakness, light-headedness and headaches.   Hematological:  Negative for adenopathy. Does not bruise/bleed easily.   Psychiatric/Behavioral:  Negative for behavioral problems. The patient is not nervous/anxious.      Past Medical History:   Diagnosis Date   • Disease of thyroid gland    • Hyperlipidemia      Past Surgical History:   Procedure Laterality Date   • BREAST BIOPSY Right 04/26/2012   • COLONOSCOPY N/A 3/20/2019    Procedure: COLONOSCOPY;  Surgeon: Chris Mcfadden MD;  Location: [facility];  Service: Gastroenterology   • TONSILLECTOMY       Family History   Problem Relation Age of Onset   • No Known Problems Sister    • No Known Problems Daughter    • Bone cancer Maternal Grandmother    • Brain cancer Maternal Aunt    • No Known Problems Maternal Uncle    • No Known Problems Daughter      Social History     Tobacco Use   • Smoking status: Never   • Smokeless tobacco: Never   Vaping  "Use   • Vaping status: Never Used   Substance and Sexual Activity   • Alcohol use: Yes   • Drug use: Never   • Sexual activity: Not on file     Current Outpatient Medications on File Prior to Visit   Medication Sig   • hydroCHLOROthiazide 25 mg tablet TAKE 1 TABLET (25 MG TOTAL) BY MOUTH DAILY.   • levothyroxine 25 mcg tablet TAKE 1 TABLET BY MOUTH EVERY DAY   • losartan (COZAAR) 25 mg tablet Take 1 tablet (25 mg total) by mouth daily   • [DISCONTINUED] ezetimibe (ZETIA) 10 mg tablet TAKE 1 TABLET BY MOUTH EVERY DAY   • [DISCONTINUED] simvastatin (ZOCOR) 40 mg tablet TAKE 1 TABLET BY MOUTH DAILY AT BEDTIME     Allergies   Allergen Reactions   • Nickel Rash     Immunization History   Administered Date(s) Administered   • COVID-19 PFIZER VACCINE 0.3 ML IM 03/09/2021, 03/29/2021   • COVID-19 Pfizer Vac BIVALENT Yoan-sucrose 12 Yr+ IM 12/07/2022   • INFLUENZA 09/09/2015   • Influenza Split High Dose Preservative Free IM 09/09/2015     Objective   /76   Pulse 78   Resp 16   Ht 5' 4\" (1.626 m)   Wt 85.3 kg (188 lb)   SpO2 98%   BMI 32.27 kg/m²     Physical Exam  Vitals and nursing note reviewed.   Constitutional:       General: She is not in acute distress.     Appearance: Normal appearance. She is not ill-appearing.   HENT:      Head: Normocephalic and atraumatic.      Right Ear: Tympanic membrane and ear canal normal. There is no impacted cerumen.      Left Ear: Tympanic membrane and ear canal normal. There is no impacted cerumen.      Nose: Nose normal. No congestion or rhinorrhea.      Mouth/Throat:      Mouth: Mucous membranes are moist.      Pharynx: Oropharynx is clear. No oropharyngeal exudate or posterior oropharyngeal erythema.   Eyes:      General: No scleral icterus.        Right eye: No discharge.         Left eye: No discharge.      Extraocular Movements: Extraocular movements intact.      Conjunctiva/sclera: Conjunctivae normal.   Neck:      Vascular: No carotid bruit.   Cardiovascular:      " Rate and Rhythm: Normal rate and regular rhythm.      Heart sounds: Normal heart sounds. No murmur heard.  Pulmonary:      Effort: Pulmonary effort is normal.      Breath sounds: Normal breath sounds. No wheezing or rales.   Abdominal:      General: Abdomen is flat. Bowel sounds are normal. There is no distension.      Palpations: Abdomen is soft. There is no mass.      Tenderness: There is no abdominal tenderness.   Musculoskeletal:         General: No swelling, tenderness or deformity. Normal range of motion.      Cervical back: Normal range of motion and neck supple. No muscular tenderness.      Right lower leg: No edema.      Left lower leg: No edema.   Lymphadenopathy:      Cervical: No cervical adenopathy.   Skin:     Coloration: Skin is not jaundiced or pale.      Findings: No erythema, lesion or rash.   Neurological:      General: No focal deficit present.      Mental Status: She is alert and oriented to person, place, and time.      Gait: Gait normal.   Psychiatric:         Mood and Affect: Mood normal.         Behavior: Behavior normal.

## 2025-02-18 NOTE — ASSESSMENT & PLAN NOTE
VICK is normal, continue same dose of levothyroxine  Orders:  •  Comprehensive metabolic panel; Future  •  Lipid panel; Future

## 2025-03-20 PROBLEM — Z12.11 SCREEN FOR COLON CANCER: Status: RESOLVED | Noted: 2019-02-05 | Resolved: 2025-03-20

## 2025-03-21 LAB — COLOGUARD RESULT REPORTABLE: NEGATIVE

## 2025-03-24 ENCOUNTER — RESULTS FOLLOW-UP (OUTPATIENT)
Dept: FAMILY MEDICINE CLINIC | Facility: CLINIC | Age: 75
End: 2025-03-24

## 2025-03-24 NOTE — TELEPHONE ENCOUNTER
----- Message from Carina Lafleur MD sent at 3/24/2025 12:55 PM EDT -----  Normal cologuard result , please inform the patient

## 2025-04-03 ENCOUNTER — CONSULT (OUTPATIENT)
Dept: FAMILY MEDICINE CLINIC | Facility: CLINIC | Age: 75
End: 2025-04-03
Payer: MEDICARE

## 2025-04-03 VITALS
SYSTOLIC BLOOD PRESSURE: 120 MMHG | RESPIRATION RATE: 16 BRPM | HEIGHT: 64 IN | OXYGEN SATURATION: 97 % | HEART RATE: 72 BPM | BODY MASS INDEX: 32.1 KG/M2 | DIASTOLIC BLOOD PRESSURE: 70 MMHG | WEIGHT: 188 LBS

## 2025-04-03 DIAGNOSIS — E03.9 ACQUIRED HYPOTHYROIDISM: ICD-10-CM

## 2025-04-03 DIAGNOSIS — I10 BENIGN ESSENTIAL HYPERTENSION: ICD-10-CM

## 2025-04-03 DIAGNOSIS — Z01.818 PREOPERATIVE CLEARANCE: Primary | ICD-10-CM

## 2025-04-03 DIAGNOSIS — H25.013 CORTICAL AGE-RELATED CATARACT OF BOTH EYES: ICD-10-CM

## 2025-04-03 DIAGNOSIS — E78.5 HYPERLIPIDEMIA, UNSPECIFIED HYPERLIPIDEMIA TYPE: ICD-10-CM

## 2025-04-03 PROCEDURE — 99214 OFFICE O/P EST MOD 30 MIN: CPT | Performed by: FAMILY MEDICINE

## 2025-04-03 NOTE — ASSESSMENT & PLAN NOTE
Blood pressure stable, continue same medication she will take the medicine in the morning of surgery with sip of water

## 2025-04-03 NOTE — PROGRESS NOTES
Pre-operative Clearance  Name: Shraddha Araiza      : 1950      MRN: [id number]  Encounter Provider: Carina Lafleur MD  Encounter Date: 4/3/2025   Encounter department: [facility]  :  Assessment & Plan  Acquired hypothyroidism  Continue levothyroxine       Benign essential hypertension  Blood pressure stable, continue same medication she will take the medicine in the morning of surgery with sip of water       Hyperlipidemia, unspecified hyperlipidemia type  Continue Lipitor       Cortical age-related cataract of both eyes         Preoperative clearance  She had echocardiogram, on 2024 which is normal     Labs are reviewed, they are normal, she is cleared for the surgery  Pre-operative Clearance:     Revised Cardiac Risk Index:  RCI RISK CLASS I (0 risk factors, risk of major cardiac complications approximately 0.5%)    Clearance:  Patient is medically optimized (CLEARED) for proposed surgery without any additional cardiac testing.      Medication Instructions:   - Avoid herbs or non-directed vitamins one week prior to surgery    - Avoid aspirin containing medications or non-steroidal anti-inflammatory drugs one week preceding surgery    - May take tylenol for pain up until the night before surgery    - ACE Inhibitors or ARBs: Continue this medication up to the evening before surgery/procedure, but do not take the morning of the day of surgery.  - Diuretics: Continue taking this medication up to the evening before surgery/procedure, but do not take in the morning of the day of surgery/procedure.  - Hyperlipidemia meds: Continue to take this medication on your normal schedule.  - Thyroid meds: Continue to take this medication on your normal schedule.       History of Present Illness     For preop for cataract     Pre-op Exam  Surgery: b/l cataract  Anticipated Date of Surgery: 25, 25  Surgeon: Latrice Porras    She has bilateral cataract and coming for preop clearance  for cataract surgery    Previous history of bleeding disorders or clots?: No  Previous Anesthesia reaction?: No  Prolonged steroid use in the last 6 months?: No    Assessment of Cardiac Risk:   - Unstable or severe angina or MI in the last 6 weeks or history of stent placement in the last year?: No   - Decompensated heart failure (e.g. New onset heart failure, NYHA  Class IV heart failure, or worsening existing heart failure)?: No  - Significant arrhythmias such as high grade AV block, symptomatic ventricular arrhythmia, newly recognized ventricular tachycardia, supraventricular tachycardia with resting heart rate >100, or symptomatic bradycardia?: No  - Severe heart valve disease including aortic stenosis or symptomatic mitral stenosis?: No      Pre-operative Risk Factors:  Elevated-risk surgery: No    History of cerebrovascular disease: No    History of ischemic heart disease: No  Pre-operative treatment with insulin: No  Pre-operative creatinine >2 mg/dL: No    History of congestive heart failure: No    Review of Systems   Constitutional:  Negative for activity change, appetite change, chills, fatigue, fever and unexpected weight change.   HENT:  Negative for congestion, ear discharge, ear pain, nosebleeds, postnasal drip, rhinorrhea, sinus pressure, sneezing, sore throat, trouble swallowing and voice change.    Eyes:  Negative for photophobia, pain, discharge, redness and itching.   Respiratory:  Negative for cough, chest tightness, shortness of breath and wheezing.    Cardiovascular:  Negative for chest pain, palpitations and leg swelling.   Gastrointestinal:  Negative for abdominal pain, constipation, diarrhea, nausea and vomiting.   Endocrine: Negative for polyuria.   Genitourinary:  Negative for dysuria, frequency and urgency.   Musculoskeletal:  Negative for arthralgias, back pain, myalgias and neck pain.   Skin:  Negative for color change, pallor and rash.   Allergic/Immunologic: Negative for environmental  "allergies and food allergies.   Neurological:  Negative for dizziness, weakness, light-headedness and headaches.   Hematological:  Negative for adenopathy. Does not bruise/bleed easily.   Psychiatric/Behavioral:  Negative for behavioral problems. The patient is not nervous/anxious.      Past Medical History   Past Medical History:   Diagnosis Date   • Disease of thyroid gland    • Hyperlipidemia      Past Surgical History:   Procedure Laterality Date   • BREAST BIOPSY Right 04/26/2012   • COLONOSCOPY N/A 3/20/2019    Procedure: COLONOSCOPY;  Surgeon: Chris Mcfadden MD;  Location: [facility];  Service: Gastroenterology   • TONSILLECTOMY       Family History   Problem Relation Age of Onset   • No Known Problems Sister    • No Known Problems Daughter    • Bone cancer Maternal Grandmother    • Brain cancer Maternal Aunt    • No Known Problems Maternal Uncle    • No Known Problems Daughter      Social History     Tobacco Use   • Smoking status: Never   • Smokeless tobacco: Never   Vaping Use   • Vaping status: Never Used   Substance and Sexual Activity   • Alcohol use: Yes   • Drug use: Never   • Sexual activity: Not on file     Current Outpatient Medications on File Prior to Visit   Medication Sig   • atorvastatin (LIPITOR) 40 mg tablet Take 1 tablet (40 mg total) by mouth daily   • hydroCHLOROthiazide 25 mg tablet TAKE 1 TABLET (25 MG TOTAL) BY MOUTH DAILY.   • levothyroxine 25 mcg tablet TAKE 1 TABLET BY MOUTH EVERY DAY   • losartan (COZAAR) 25 mg tablet Take 1 tablet (25 mg total) by mouth daily   • psyllium (KONSYL) 33 % POWD Take by mouth daily 1 tabspoon in glass of water daily     Allergies   Allergen Reactions   • Nickel Rash     Objective   /70   Pulse 72   Resp 16   Ht 5' 4\" (1.626 m)   Wt 85.3 kg (188 lb)   SpO2 97%   BMI 32.27 kg/m²     Physical Exam  Vitals and nursing note reviewed.   Constitutional:       General: She is not in acute distress.     Appearance: Normal appearance. She is not " ill-appearing.   HENT:      Head: Normocephalic and atraumatic.      Nose: Nose normal. No congestion or rhinorrhea.      Mouth/Throat:      Mouth: Mucous membranes are moist.      Pharynx: Oropharynx is clear. No oropharyngeal exudate or posterior oropharyngeal erythema.   Eyes:      General: No scleral icterus.        Right eye: No discharge.         Left eye: No discharge.      Extraocular Movements: Extraocular movements intact.      Conjunctiva/sclera: Conjunctivae normal.   Cardiovascular:      Rate and Rhythm: Normal rate and regular rhythm.      Heart sounds: Normal heart sounds. No murmur heard.  Pulmonary:      Effort: Pulmonary effort is normal.      Breath sounds: Normal breath sounds. No wheezing or rales.   Abdominal:      General: Abdomen is flat. Bowel sounds are normal. There is no distension.      Palpations: Abdomen is soft. There is no mass.      Tenderness: There is no abdominal tenderness.   Musculoskeletal:         General: No swelling, tenderness or deformity. Normal range of motion.      Cervical back: Normal range of motion and neck supple. No muscular tenderness.      Right lower leg: No edema.      Left lower leg: No edema.   Lymphadenopathy:      Cervical: No cervical adenopathy.   Skin:     Coloration: Skin is not jaundiced or pale.      Findings: No erythema, lesion or rash.   Neurological:      General: No focal deficit present.      Mental Status: She is alert and oriented to person, place, and time.      Gait: Gait normal.   Psychiatric:         Mood and Affect: Mood normal.         Behavior: Behavior normal.         Thought Content: Thought content normal.           Carina Lafleur MD

## 2025-04-03 NOTE — ASSESSMENT & PLAN NOTE
She had echocardiogram, on October 30, 2024 which is normal     Labs are reviewed, they are normal, she is cleared for the surgery

## 2025-04-10 DIAGNOSIS — I10 ESSENTIAL HYPERTENSION: ICD-10-CM

## 2025-04-10 RX ORDER — LOSARTAN POTASSIUM 25 MG/1
25 TABLET ORAL DAILY
Qty: 90 TABLET | Refills: 1 | Status: SHIPPED | OUTPATIENT
Start: 2025-04-10

## 2025-05-16 ENCOUNTER — HOSPITAL ENCOUNTER (OUTPATIENT)
Dept: RADIOLOGY | Facility: HOSPITAL | Age: 75
Discharge: HOME | End: 2025-05-16
Attending: FAMILY MEDICINE
Payer: MEDICARE

## 2025-05-16 VITALS — BODY MASS INDEX: 30.73 KG/M2 | HEIGHT: 64 IN | WEIGHT: 180 LBS

## 2025-05-16 DIAGNOSIS — Z12.31 ENCOUNTER FOR SCREENING MAMMOGRAM FOR BREAST CANCER: ICD-10-CM

## 2025-05-16 PROCEDURE — 77063 BREAST TOMOSYNTHESIS BI: CPT

## 2025-05-16 PROCEDURE — 77067 SCR MAMMO BI INCL CAD: CPT

## 2025-06-02 ENCOUNTER — APPOINTMENT (OUTPATIENT)
Dept: RADIOLOGY | Facility: AMBULARY SURGERY CENTER | Age: 75
End: 2025-06-02
Attending: ORTHOPAEDIC SURGERY
Payer: MEDICARE

## 2025-06-02 ENCOUNTER — OFFICE VISIT (OUTPATIENT)
Dept: OBGYN CLINIC | Facility: CLINIC | Age: 75
End: 2025-06-02
Payer: MEDICARE

## 2025-06-02 VITALS — HEIGHT: 64 IN | WEIGHT: 180 LBS | BODY MASS INDEX: 30.73 KG/M2

## 2025-06-02 DIAGNOSIS — M25.551 RIGHT HIP PAIN: Primary | ICD-10-CM

## 2025-06-02 DIAGNOSIS — M21.70 LEG LENGTH DISCREPANCY: ICD-10-CM

## 2025-06-02 DIAGNOSIS — M25.551 RIGHT HIP PAIN: ICD-10-CM

## 2025-06-02 DIAGNOSIS — M70.61 GREATER TROCHANTERIC BURSITIS, RIGHT: ICD-10-CM

## 2025-06-02 DIAGNOSIS — M54.16 RADICULOPATHY, LUMBAR REGION: ICD-10-CM

## 2025-06-02 PROCEDURE — 99204 OFFICE O/P NEW MOD 45 MIN: CPT | Performed by: ORTHOPAEDIC SURGERY

## 2025-06-02 PROCEDURE — 20610 DRAIN/INJ JOINT/BURSA W/O US: CPT | Performed by: ORTHOPAEDIC SURGERY

## 2025-06-02 PROCEDURE — 73502 X-RAY EXAM HIP UNI 2-3 VIEWS: CPT

## 2025-06-02 RX ORDER — BETAMETHASONE SODIUM PHOSPHATE AND BETAMETHASONE ACETATE 3; 3 MG/ML; MG/ML
12 INJECTION, SUSPENSION INTRA-ARTICULAR; INTRALESIONAL; INTRAMUSCULAR; SOFT TISSUE
Status: COMPLETED | OUTPATIENT
Start: 2025-06-02 | End: 2025-06-02

## 2025-06-02 RX ORDER — BUPIVACAINE HYDROCHLORIDE 2.5 MG/ML
2 INJECTION, SOLUTION INFILTRATION; PERINEURAL
Status: COMPLETED | OUTPATIENT
Start: 2025-06-02 | End: 2025-06-02

## 2025-06-02 RX ORDER — METHYLPREDNISOLONE 4 MG/1
TABLET ORAL
Qty: 21 EACH | Refills: 0 | Status: SHIPPED | OUTPATIENT
Start: 2025-06-02

## 2025-06-02 RX ADMIN — BUPIVACAINE HYDROCHLORIDE 2 ML: 2.5 INJECTION, SOLUTION INFILTRATION; PERINEURAL at 14:00

## 2025-06-02 RX ADMIN — BETAMETHASONE SODIUM PHOSPHATE AND BETAMETHASONE ACETATE 12 MG: 3; 3 INJECTION, SUSPENSION INTRA-ARTICULAR; INTRALESIONAL; INTRAMUSCULAR; SOFT TISSUE at 14:00

## 2025-06-02 NOTE — ASSESSMENT & PLAN NOTE
Orders:    XR hip/pelv 2-3 vws right if performed; Future    Large joint arthrocentesis: R greater trochanteric bursa    CT leg length evaluation (scanogram); Future    Ambulatory referral to Spine & Pain Management; Future

## 2025-06-02 NOTE — PROGRESS NOTES
Name: Shraddha Araiza      : 1950      MRN: [id number]  Encounter Provider: Mike Thompson DO  Encounter Date: 2025   Encounter department: [facility]  Assessment & Plan  Right hip pain  Greater trochanteric bursitis, right  Leg length discrepancy        Orders:    XR hip/pelv 2-3 vws right if performed; Future    Large joint arthrocentesis: R greater trochanteric bursa    CT leg length evaluation (scanogram); Future    Ambulatory referral to Spine & Pain Management; Future    Radiculopathy, lumbar region    Orders:    Ambulatory referral to Spine & Pain Management; Future          Subjective:  Shraddha Araiza is a 74 y.o. female who presents today for evaluation and treatment of right posterior hip and right leg pain       2 years bottock pain, pain with ascending and descending stairs  Compensating is causing knee pain  10/10 at times. Very early in the morning 0/10, as she fatigues throughout the day it gets worse  Lateral leg, lateral knee, to the ankle  Denies low back pain  Stiff when rising from resting position  Denies n/t, mechanical symptoms, trauma or injury  - slump  Aleve with mild relief  Has done PT for balance but not this      The following portions of the patient's history were reviewed and updated as appropriate: allergies, current medications, past family history, past social history, past surgical history and problem list.      Social History     Socioeconomic History    Marital status: /Civil Union     Spouse name: Not on file    Number of children: Not on file    Years of education: Not on file    Highest education level: Not on file   Occupational History    Not on file   Tobacco Use    Smoking status: Never    Smokeless tobacco: Never   Vaping Use    Vaping status: Never Used   Substance and Sexual Activity    Alcohol use: Yes    Drug use: Never    Sexual activity: Not on file   Other Topics Concern    Not on file   Social History  "Narrative    Not on file     Social Drivers of Health     Financial Resource Strain: Low Risk  (7/17/2023)    Overall Financial Resource Strain (CARDIA)     Difficulty of Paying Living Expenses: Not very hard   Food Insecurity: No Food Insecurity (8/9/2024)    Nursing - Inadequate Food Risk Classification     Worried About Running Out of Food in the Last Year: Never true     Ran Out of Food in the Last Year: Never true     Ran Out of Food in the Last Year: Not on file   Transportation Needs: No Transportation Needs (8/9/2024)    PRAPARE - Transportation     Lack of Transportation (Medical): No     Lack of Transportation (Non-Medical): No   Physical Activity: Not on file   Stress: Not on file   Social Connections: Not on file   Intimate Partner Violence: Not on file   Housing Stability: Low Risk  (8/9/2024)    Housing Stability Vital Sign     Unable to Pay for Housing in the Last Year: No     Number of Times Moved in the Last Year: 1     Homeless in the Last Year: No     Past Medical History[1]  Past Surgical History[2]  Allergies[3]  Medications Ordered Prior to Encounter[4]         Objective:    Review of Systems  Pertinent items are noted in HPI.  All other systems were reviewed and are negative.    Physical Exam  Cons: Appears well.  No apparent distress.  Psych: Alert. Oriented x3.  Mood and affect normal.  Eyes: PERRLA, EOMI  Resp: Normal effort.  No audible wheezing or stridor.  CV: Palpable pulse.  No discernable arrhythmia.  No LE edema.  Lymph:  No palpable cervical, axillary, or inguinal lymphadenopathy.  Skin: Warm.  No palpable masses.  No visible lesions.  Neuro: Normal muscle tone.  Normal and symmetric DTR's.    BMI:   Estimated body mass index is 30.9 kg/m² as calculated from the following:    Height as of this encounter: 5' 4\" (1.626 m).    Weight as of this encounter: 81.6 kg (180 lb).  No results found for: \"HGBA1C\"      Right Hip Exam     Range of Motion   External rotation:  40   Internal " "rotation:  30     Muscle Strength   The patient has normal right hip strength.    Comments:    - log roll  + TTP sciatic notch, greater trochanter, PSIS, L4, L3, iliolumbar ligament            Procedures  Large joint arthrocentesis: R greater trochanteric bursa    Performed by: Mike Thompson DO  Authorized by: Mike Thompson DO    Universal Protocol:  Consent: Verbal consent obtained  Risks and benefits: risks, benefits and alternatives were discussed  Consent given by: patient  Time out: Immediately prior to procedure a \"time out\" was called to verify the correct patient, procedure, equipment, support staff and site/side marked as required.  Patient understanding: patient states understanding of the procedure being performed  Site marked: the operative site was marked  Supporting Documentation  Indications: pain and diagnostic evaluation     Is this a Visco injection? NoProcedure Details  Location: hip - R greater trochanteric bursa  Preparation: Patient was prepped and draped in the usual sterile fashion  Needle size: 22 G  Approach: lateral  Medications administered: 2 mL bupivacaine 0.25 %; 12 mg betamethasone acetate-betamethasone sodium phosphate 6 (3-3) mg/mL    Patient tolerance: patient tolerated the procedure well with no immediate complications  Dressing:  Sterile dressing applied            Diagnostics, reviewed and taken today if performed as documented:  I have personally reviewed pertinent films in PACS and my interpretation is mild right hip osteoarthritis, pelvis tilt, degenerative change and scoliosis of the visualized lumbar spine.        Scribe Attestation      I,:   am acting as a scribe while in the presence of the attending physician.:       I,:   personally performed the services described in this documentation    as scribed in my presence.:             Portions of the record may have been created with voice recognition software.  Occasional wrong word or \"sound a like\" substitutions may have " occurred due to the inherent limitations of voice recognition software.  Read the chart carefully and recognize, using context, where substitutions have occurred.         [1]   Past Medical History:  Diagnosis Date    Disease of thyroid gland     Hyperlipidemia    [2]   Past Surgical History:  Procedure Laterality Date    BREAST BIOPSY Right 04/26/2012    COLONOSCOPY N/A 3/20/2019    Procedure: COLONOSCOPY;  Surgeon: Chris Mcfadden MD;  Location: [facility];  Service: Gastroenterology    TONSILLECTOMY     [3]   Allergies  Allergen Reactions    Nickel Rash   [4]   Current Outpatient Medications on File Prior to Visit   Medication Sig Dispense Refill    atorvastatin (LIPITOR) 40 mg tablet Take 1 tablet (40 mg total) by mouth daily 100 tablet 2    hydroCHLOROthiazide 25 mg tablet TAKE 1 TABLET (25 MG TOTAL) BY MOUTH DAILY. 90 tablet 1    levothyroxine 25 mcg tablet TAKE 1 TABLET BY MOUTH EVERY DAY 90 tablet 1    losartan (COZAAR) 25 mg tablet TAKE 1 TABLET (25 MG TOTAL) BY MOUTH DAILY. 90 tablet 1    psyllium (KONSYL) 33 % POWD Take by mouth daily 1 tabspoon in glass of water daily 570 g 1     No current facility-administered medications on file prior to visit.

## 2025-06-09 ENCOUNTER — HOSPITAL ENCOUNTER (OUTPATIENT)
Dept: RADIOLOGY | Facility: HOSPITAL | Age: 75
Discharge: HOME | End: 2025-06-09
Attending: ORTHOPAEDIC SURGERY
Payer: MEDICARE

## 2025-06-09 DIAGNOSIS — M25.551 RIGHT HIP PAIN: ICD-10-CM

## 2025-06-09 DIAGNOSIS — M21.70 LEG LENGTH DISCREPANCY: ICD-10-CM

## 2025-06-09 PROCEDURE — 77073 BONE LENGTH STUDIES: CPT

## 2025-06-16 ENCOUNTER — OFFICE VISIT (OUTPATIENT)
Dept: PAIN MEDICINE | Facility: CLINIC | Age: 75
End: 2025-06-16
Attending: ORTHOPAEDIC SURGERY
Payer: MEDICARE

## 2025-06-16 ENCOUNTER — APPOINTMENT (OUTPATIENT)
Dept: RADIOLOGY | Facility: CLINIC | Age: 75
End: 2025-06-16
Attending: STUDENT IN AN ORGANIZED HEALTH CARE EDUCATION/TRAINING PROGRAM
Payer: MEDICARE

## 2025-06-16 VITALS — WEIGHT: 188 LBS | BODY MASS INDEX: 32.1 KG/M2 | HEIGHT: 64 IN

## 2025-06-16 DIAGNOSIS — M47.816 LUMBAR SPONDYLOSIS: ICD-10-CM

## 2025-06-16 DIAGNOSIS — M54.16 LUMBAR RADICULOPATHY: ICD-10-CM

## 2025-06-16 DIAGNOSIS — M54.16 LUMBAR RADICULOPATHY: Primary | ICD-10-CM

## 2025-06-16 DIAGNOSIS — M21.70 LEG LENGTH DISCREPANCY: ICD-10-CM

## 2025-06-16 PROCEDURE — 72110 X-RAY EXAM L-2 SPINE 4/>VWS: CPT

## 2025-06-16 PROCEDURE — 99204 OFFICE O/P NEW MOD 45 MIN: CPT | Performed by: STUDENT IN AN ORGANIZED HEALTH CARE EDUCATION/TRAINING PROGRAM

## 2025-06-16 PROCEDURE — G2211 COMPLEX E/M VISIT ADD ON: HCPCS | Performed by: STUDENT IN AN ORGANIZED HEALTH CARE EDUCATION/TRAINING PROGRAM

## 2025-06-16 NOTE — PROGRESS NOTES
- patient had speech eval  - Regular diet now Assessment:  1. Lumbar radiculopathy    2. Lumbar spondylosis    3. Leg length discrepancy      Patient is a pleasant 74-year-old woman who presents as a new patient visit after being referred by Dr. Thompson for right hip pain and concerns for lumbar radiculopathy.  Of note patient did undergo a right greater trochanter bursa injection with Dr. Thompson on 6/2/2025.  Patient done with this pain for the past 2 years.  She is complaining mainly of right-sided low back pain with right-sided radicular symptoms which extend into her calf.  Over the past month the intensity pains been moderate to severe she rates pain currently is 8 out of 10 on a pain scale.  She describes the pain as throbbing, dull aching with some associated weakness in lower extremities.  She does not use any assistive devices.  Activity increases her pain include lying down, sitting.  Patient does use Aleve and Tylenol which does provide relief.  She also has had corticosteroid injections.  Patient does report moderate relief from the injections and moderately from chiropractic manipulation.  Patient has no recent imaging of her lumbar spine.  She does have x-ray of her right hip which does demonstrate mild arthritis and degenerative changes in the lumbar spine.  Of note patient has done an extensive amount of physical therapy since November for back pain, knee pain and balance and gait.  Patient with x-ray of the lumbar spine independently reviewed and discussed with patient.  Patient with signs of facet arthropathy at multiple levels with moderate disc space narrowing appreciated at multiple levels.     On examination patient with positive straight leg raise with symptoms consistent with lumbar radiculopathy.  Given patient has failed both physical therapy along with chiropractic care I think is reasonable to order an MRI of the lumbar spine to further evaluate.  Pending MRI results can consider injection therapy.  Will also place a new referral for  extension of physical therapy exercises.    I have spent a total time of 45 minutes in caring for this patient on the day of the visit/encounter including Diagnostic results, Prognosis, Instructions for management, Patient and family education, Risk factor reductions, Impressions, Documenting in the medical record, Reviewing/placing orders in the medical record (including tests, medications, and/or procedures), and Obtaining or reviewing history  .      Plan:  Orders Placed This Encounter   Procedures    MRI lumbar spine wo contrast     Standing Status:   Future     Expected Date:   6/16/2025     Expiration Date:   6/16/2029     Scheduling Instructions:      There is no preparation for this test. Please leave your jewelry and valuables at home, wedding rings are the exception. All patients will be required to change into a hospital gown and pants.  Street clothes are not permitted in the MRI.  Magnetic nail polish must be removed prior to arrival for your test. Please bring your insurance cards, a form of photo ID and a list of your medications with you. Arrive 15 minutes prior to your appointment time in order to register. Please bring any prior CT or MRI studies of this area that were not performed at a [facility].            To schedule this appointment, please contact Central Scheduling at [phone] or [phone].            Prior to your appointment, please make sure you complete the MRI Screening Form when you e-Check in for your appointment. This will be available starting 7 days before your appointment in [employer]. You may receive an e-mail with an activation code if you do not have a [employer] account. If you do not have access to a device, we will complete your screening at your appointment.     Reason for Exam:   Lumbar radiculopathy with failure of conservative management     What is the patient's sedation requirement?:   No Sedation     Does the patient need medication for Claustrophobia?  If yes, order medication at this point.:   No     Does the patient wear a life vest, have an implanted cardiac device, a stimulation device, a sleep apnea stimulator, or a breast tissue expansion device?:   No     Release to patient through Mychart:   Immediate    XR spine lumbar minimum 4 views non injury     Standing Status:   Future     Number of Occurrences:   1     Expected Date:   6/16/2025     Expiration Date:   6/16/2029     Scheduling Instructions:      Bring along any outside films relating to this procedure.          Ambulatory referral to Physical Therapy     Standing Status:   Future     Expiration Date:   6/16/2026     Referral Priority:   Routine     Referral Type:   Physical Therapy     Referral Reason:   Specialty Services Required     Requested Specialty:   Physical Therapy     Number of Visits Requested:   1     Expiration Date:   6/16/2026       No orders of the defined types were placed in this encounter.      My impressions and treatment recommendations were discussed in detail with the patient, who verbalized understanding and had no further questions.      Follow-up is planned in six weeks time or sooner as warranted.  Discharge instructions were provided. I personally saw and examined the patient and I agree with the above discussed plan of care.    History of Present Illness:    Shraddha Araiza is a 74 y.o. female who presents to [facility] for initial evaluation of the above stated pain complaints. The patient has a past medical and chronic pain history as outlined in the assessment section. She was referred by Mike Thompson, DO  [address] [facility]  [address] [place] [id number].    Patient is a pleasant 74-year-old woman who presents as a new patient visit after being referred by Dr. Thompson for right hip pain and concerns for lumbar radiculopathy.  Of note patient did undergo a right greater trochanter bursa injection with Dr. Thompson on 6/2/2025.  Patient done  with this pain for the past 2 years.  She is complaining mainly of right-sided low back pain with right-sided radicular symptoms which extend into her calf.  Over the past month the intensity pains been moderate to severe she rates pain currently is 8 out of 10 on a pain scale.  She describes the pain as throbbing, dull aching with some associated weakness in lower extremities.  She does not use any assistive devices.  Activity increases her pain include lying down, sitting.  Patient does use Aleve and Tylenol which does provide relief.  She also has had corticosteroid injections.  Patient does report moderate relief from the injections and moderately from chiropractic manipulation.  Patient has no recent imaging of her lumbar spine.  She does have x-ray of her right hip which does demonstrate mild arthritis and degenerative changes in the lumbar spine.  Of note patient has done an extensive amount of physical therapy since November for back pain, knee pain and balance and gait.    Review of Systems:    Review of Systems   Constitutional:  Negative for chills, fatigue and unexpected weight change.   HENT:  Negative for ear pain, mouth sores and sinus pressure.    Eyes:  Negative for pain, redness and visual disturbance.   Respiratory:  Negative for shortness of breath and wheezing.    Cardiovascular:  Negative for chest pain and palpitations.   Gastrointestinal:  Negative for abdominal pain and nausea.   Endocrine: Negative for polyphagia.   Musculoskeletal:  Positive for gait problem and joint swelling. Negative for arthralgias, back pain and neck pain.        Decreased ROM, joint and muscle pain   Skin:  Negative for wound.   Neurological:  Positive for weakness. Negative for seizures and numbness.   Psychiatric/Behavioral:  Positive for sleep disturbance. Negative for decreased concentration and dysphoric mood.            Past Medical History[1]    Past Surgical History[2]    Family History[3]    Social History  "    Occupational History    Not on file   Tobacco Use    Smoking status: Never    Smokeless tobacco: Never   Vaping Use    Vaping status: Never Used   Substance and Sexual Activity    Alcohol use: Yes    Drug use: Never    Sexual activity: Not on file       Current Medications[4]    Allergies[5]    Physical Exam:    Ht 5' 4\" (1.626 m)   Wt 85.3 kg (188 lb)   BMI 32.27 kg/m²     Constitutional: normal, well developed, well nourished, alert, in no distress and non-toxic and no overt pain behavior.  Eyes: anicteric  HEENT: grossly intact  Neck: supple, symmetric, trachea midline and no masses   Pulmonary:even and unlabored  Cardiovascular:No edema or pitting edema present  Skin:Normal without rashes or lesions and well hydrated  Psychiatric:Mood and affect appropriate  Neurologic:Cranial Nerves II-XII grossly intact  Musculos   XR hip/pelv 2-3 vws right if performed  Status: Final result     PACS Images - GE     Show images for XR hip/pelv 2-3 vws right if performed  PACS Images - Sectra     Show images for XR hip/pelv 2-3 vws right if performed  Study Result    Narrative & Impression         RIGHT HIP     INDICATION:   Pain in right hip.      COMPARISON:  None.     VIEWS:  XR HIP/PELV 2-3 VWS RIGHT W PELVIS IF PERFORMED      FINDINGS:     There is no acute fracture or dislocation.     No significant hip degenerative changes.     No lytic or blastic osseous lesion.     Soft tissues are unremarkable.     Degenerative changes visualized lower lumbar spine.     IMPRESSION:        No acute osseous abnormality of the right hip        Electronically signed: 06/02/2025 07:08 PM Sammy Holm MD     Imaging    XR hip/pelv 2-3 vws right if performed (Order: [id number]) - 6/2/2025    Result History    XR hip/pelv 2-3 vws right if performed (Order #[id number]) on 6/2/2025 - Order Result History Report    Order Report     Order Details  Order Questions    Question Answer   Test performed Elective- non urgent            Result " "Information    Status Priority Source   Final result (6/2/2025  7:08 PM) Routine      Reason for Exam    Dx: Right hip pain [M25.551 (ICD-10-CM)]       All Reviewers List    Serena Huertas PA-C on 6/3/2025  7:55 AM         XR hip/pelv 2-3 vws right if performed: Patient Communication     Add Comments   Add Notifications        Signed by    Signed Date/Time  Phone Pager   DREA HANSON 6/02/2025 19:08 [phone]        Exam Information    Status Exam Begun  Exam Ended Performing Tech   Final [99] 6/02/2025 14:06 6/02/2025  2:10 PM DANNA GARCIACHETTE [[id number]]       Questionnaire          Question Answer Comment   1. When should the test be performed? Elective- non urgent          End Exam      [facility] END VERIFY IMAGES IN PACS    Question Answer Comment   1. Have you verified the patient's images in PACS? Yes    2. Why have the images not been verified in PACS?           IMAGING END EXAM XRAY    Question Answer Comment   1. Script Info/History/Comments: Right hip pain    2. Any additional comments the Radiologist needs to know?     3. Was the patient shielded? No    4. Was fluoro used? No    5. Fluoro time? Please type \"MINUTES\" or \"SECONDS\" following your time.     6. Were all the images in this exam within the acceptable exposure index range as posted? Yes    7. If No, provide additional information why (ie which view or position, fixed or AEC, wall/table/tabletop, etc)     8. Number of images sent to PACS: 3    9. Was jewelry removed from the patient? No    10. Jewelry removed:           PATIENT EDUCATION    Question Answer Comment   1. Was the patient educated? Yes    2. Why was the patient not educated?                External Results Report    Open External Results Report      Encounter    View Encounter                     Sedation Documentation Timeline (6/2/2025 00:00 to 6/2/2025 14:10)    An end event has not been filed for the most recent intervention. Due to this intervention’s " length, all data may not appear below. To see all data, file an end event.  6/2/2025 6/2/2025 Event By   14:03:50 Discharge Orders Placed JS   Imaging  - XR hip/pelv 2-3 vws right if performed         14:06:02 Discharge Order Performed    XR hip/pelv 2-3 vws right if performed  - ID: [id number]           Pre-op Summary    Pre-op             Recovery Summary    Recovery                 Routing History    None   keletal:normal gait. +SLR.     Imaging      MRI lumbar spine wo contrast    (Results Pending)       Orders Placed This Encounter   Procedures    MRI lumbar spine wo contrast    XR spine lumbar minimum 4 views non injury    Ambulatory referral to Physical Therapy          [1]   Past Medical History:  Diagnosis Date    Disease of thyroid gland     Hyperlipidemia    [2]   Past Surgical History:  Procedure Laterality Date    BREAST BIOPSY Right 04/26/2012    COLONOSCOPY N/A 3/20/2019    Procedure: COLONOSCOPY;  Surgeon: Chris Mcfadden MD;  Location[facility];  Service: Gastroenterology    TONSILLECTOMY     [3]   Family History  Problem Relation Name Age of Onset    No Known Problems Sister      No Known Problems Daughter      Bone cancer Maternal Grandmother      Brain cancer Maternal Aunt      No Known Problems Maternal Uncle      No Known Problems Daughter     [4]   Current Outpatient Medications:     atorvastatin (LIPITOR) 40 mg tablet, Take 1 tablet (40 mg total) by mouth daily, Disp: 100 tablet, Rfl: 2    hydroCHLOROthiazide 25 mg tablet, TAKE 1 TABLET (25 MG TOTAL) BY MOUTH DAILY., Disp: 90 tablet, Rfl: 1    levothyroxine 25 mcg tablet, TAKE 1 TABLET BY MOUTH EVERY DAY, Disp: 90 tablet, Rfl: 1    losartan (COZAAR) 25 mg tablet, TAKE 1 TABLET (25 MG TOTAL) BY MOUTH DAILY., Disp: 90 tablet, Rfl: 1    methylPREDNISolone 4 MG tablet therapy pack, Use as directed on package (Patient not taking: Reported on 6/16/2025), Disp: 21 each, Rfl: 0    psyllium (KONSYL) 33 % POWD, Take by mouth daily 1 tabspoon in  glass of water daily, Disp: 570 g, Rfl: 1  [5]   Allergies  Allergen Reactions    Nickel Rash

## 2025-06-18 ENCOUNTER — EVALUATION (OUTPATIENT)
Dept: PHYSICAL THERAPY | Facility: CLINIC | Age: 75
End: 2025-06-18
Attending: STUDENT IN AN ORGANIZED HEALTH CARE EDUCATION/TRAINING PROGRAM
Payer: MEDICARE

## 2025-06-18 DIAGNOSIS — M54.16 LUMBAR RADICULOPATHY: ICD-10-CM

## 2025-06-18 PROCEDURE — 97110 THERAPEUTIC EXERCISES: CPT

## 2025-06-18 PROCEDURE — 97162 PT EVAL MOD COMPLEX 30 MIN: CPT

## 2025-06-18 NOTE — PROGRESS NOTES
PT Evaluation     Today's date: 2025  Patient name: Shraddha Araiza  : 1950  MRN: [id number]  Referring provider: Anthony Kaufman MD  Dx:   Encounter Diagnosis     ICD-10-CM    1. Lumbar radiculopathy  M54.16 Ambulatory referral to Physical Therapy          Start Time: 845  Stop Time: 930  Total time in clinic (min): 45 minutes    Assessment/Plan    Subjective Evaluation    History of Present Illness  Mechanism of injury: WORK/SCHOOL:  HOME LIFE:   HOBBIES/EXERCISE: read (tell it to my horse current book)  PLOF:  stairs  HISTORY OF CURRENT INJURY:  Began about 2 years, insidious onset, recent flare during stairs  3 weeks ago. Got a cortisone shot and received PT orders.   PAIN LOCATION/DESCRIPTORS:butt pain radiation allthe way down right leg /foot  AGGRAVATING FACTORS: steps (right leg ascension) sleep (laying on either side)  EASES: sitting/resting  DAY PATTERN:  IMAGING:  Getting MRI on 25  SPECIAL QUESTIONS:    Shraddha denies a new onset of Tingling, Numbness, and Saddle anesthesia .  PATIENT GOALS: stairs, sleeping    Quality of life: good    Patient Goals  Patient goals for therapy: decreased edema, improved balance, decreased pain, increased motion, increased strength and independence with ADLs/IADLs    Pain  Current pain ratin  At best pain ratin  At worst pain ratin  Quality: pulling  Relieving factors: change in position and rest          Objective     Postural Observations  Seated posture: fair  Standing posture: fair      Neurological Testing     Sensation     Lumbar   Left   Intact: light touch    Right   Intact: light touch    Active Range of Motion     Lumbar   Flexion:  WFL  Extension:  Restriction level: minimal  Left lateral flexion:  Restriction level: moderate  Right lateral flexion:  Restriction level: moderate    Additional Active Range of Motion Details  No dural irritation    Passive Range of Motion   Cervical/Thoracic Spine     Thoracic     Flexion  (degrees):  WFL  Extension (degrees):  WFL    Joint Play     Pain: L2, L3, L4, L5 and S1   Mechanical Assessment    Cervical      Thoracic    Lying extension:   Pain location: no change    Lumbar    Standing flexion:    Pain location:no change    Strength/Myotome Testing     Left Hip   Planes of Motion   Flexion: 4    Right Hip   Planes of Motion   Flexion: 4    Tests     Lumbar     Left   Positive passive SLR.     General Comments:    Lower quarter screen   Hips: unremarkable  Knees: unremarkable  Foot/ankle: unremarkable  Graphical documentation:           Flowsheet Rows      Flowsheet Row Most Recent Value   PT/OT G-Codes    Current Score 60   Projected Score 65                POC Expires Auth Status Start Date Expiration Date PT Visit Limit           Date        Used        Remaining           Diagnosis: L Lumbar Radiculopathy   Precautions: na   Primary Goals: walking   *asterisks by exercise = given for HEP   Manuals        PA Lumbar NV    DO FOTO                                   There Ex        QSET x10       SLR x10                                                               Neuro Re-Ed        Supine Sciatic NERver aaliyah NV       CAT COW                                                                                Patient education Diagnosis, prognosis, activity modification        Re-evaluation              Ther Act                                         Modalities             Traction NV potentially

## 2025-06-24 ENCOUNTER — OFFICE VISIT (OUTPATIENT)
Dept: PHYSICAL THERAPY | Facility: CLINIC | Age: 75
End: 2025-06-24
Attending: STUDENT IN AN ORGANIZED HEALTH CARE EDUCATION/TRAINING PROGRAM
Payer: MEDICARE

## 2025-06-24 DIAGNOSIS — M54.16 LUMBAR RADICULOPATHY: Primary | ICD-10-CM

## 2025-06-24 PROCEDURE — 97110 THERAPEUTIC EXERCISES: CPT

## 2025-06-24 PROCEDURE — 97112 NEUROMUSCULAR REEDUCATION: CPT

## 2025-06-24 NOTE — PROGRESS NOTES
Daily Note     Today's date: 2025  Patient name: Shraddha Araiza  : 1950  MRN: [id number]  Referring provider: Anthony Kaufman MD  Dx:   Encounter Diagnosis     ICD-10-CM    1. Lumbar radiculopathy  M54.16           Start Time: 845  Stop Time: 930  Total time in clinic (min): 45 minutes    Subjective: Patient feels minimal pain or nerve irritation.      Objective: See treatment diary below      Assessment: Tolerated treatment well. Reports more symptoms on the right today, overall minimal pain with radiculopathy absent.   Educated on nerve glides and performed gluteal strengthening and lumbar mobility drills to promote truncal stability. Patient demonstrated fatigue post treatment, exhibited good technique with therapeutic exercises, and would benefit from continued PT      Plan: Continue per plan of care.         POC Expires Auth Status Start Date Expiration Date PT Visit Limit           Date        Used        Remaining           Diagnosis: L Lumbar Radiculopathy   Precautions: na   Primary Goals: walking   *asterisks by exercise = given for HEP   Manuals        PA Lumbar NV JW Gr: II III   DO FOTO   Paraspinal massage  JW                              There Ex        QSET x10 x10      SLR x10 x10      DKTR  x30      GERBER with ROT  x10      Bridge  x20                                      Neuro Re-Ed        Supine Sciatic NERver glides NV 4x10      CAT COW                                                                                Patient education Diagnosis, prognosis, activity modification        Re-evaluation              Ther Act                                         Modalities             Traction NV potentially

## 2025-06-30 ENCOUNTER — OFFICE VISIT (OUTPATIENT)
Dept: PHYSICAL THERAPY | Facility: CLINIC | Age: 75
End: 2025-06-30
Attending: STUDENT IN AN ORGANIZED HEALTH CARE EDUCATION/TRAINING PROGRAM
Payer: MEDICARE

## 2025-06-30 DIAGNOSIS — M54.16 LUMBAR RADICULOPATHY: Primary | ICD-10-CM

## 2025-06-30 PROCEDURE — 97110 THERAPEUTIC EXERCISES: CPT

## 2025-06-30 PROCEDURE — 97112 NEUROMUSCULAR REEDUCATION: CPT

## 2025-06-30 NOTE — PROGRESS NOTES
Daily Note     Today's date: 2025  Patient name: Shraddha Araiza  : 1950  MRN: [id number]  Referring provider: Anthony Kaufman MD  Dx:   Encounter Diagnosis     ICD-10-CM    1. Lumbar radiculopathy  M54.16           Start Time: 915  Stop Time: 1000  Total time in clinic (min): 45 minutes    Subjective: Comments that she was busy, didn't get to exercises but not having any discomfort.       Objective: See treatment diary below      Assessment: Tolerated treatment well.  Rather asymptomatic with therapeutic activities today.  Worked on core stability and lumbar mobility.  Cues for pelvic control with supine abdominal strengthening.    Patient demonstrated fatigue post treatment, exhibited good technique with therapeutic exercises, and would benefit from continued PT      Plan: Continue per plan of care.         POC Expires Auth Status Start Date Expiration Date PT Visit Limit           Date        Used        Remaining           Diagnosis: L Lumbar Radiculopathy   Precautions: na   Primary Goals: walking   *asterisks by exercise = given for HEP   Manuals       PA Lumbar NV JW Gr: II III JW Gr: II III  DO FOTO   Paraspinal massage  JW JW P! Right glute                             There Ex        QSET x10 x10      SLR x10 x10      DKTR  x30 x20     GERBER with ROT  x10 x10     Bridge  x20 2x10     Deadbugs   x20     Double knee lift off supine   x12     Pallof Press   7# x10 ea     Seated Lumbar Pball roll   15x all dir     Standing Pball    x15             Neuro Re-Ed        Supine Sciatic NERver glides NV 4x10      CAT COW   x10                                                                             Patient education Diagnosis, prognosis, activity modification  Pelvic control with supine abdominal exercises      Re-evaluation              Ther Act                                         Modalities             Traction NV potentially

## 2025-07-05 ENCOUNTER — HOSPITAL ENCOUNTER (OUTPATIENT)
Dept: RADIOLOGY | Facility: HOSPITAL | Age: 75
Discharge: HOME | End: 2025-07-05
Attending: STUDENT IN AN ORGANIZED HEALTH CARE EDUCATION/TRAINING PROGRAM
Payer: MEDICARE

## 2025-07-05 DIAGNOSIS — M54.16 LUMBAR RADICULOPATHY: ICD-10-CM

## 2025-07-05 PROCEDURE — 72148 MRI LUMBAR SPINE W/O DYE: CPT

## 2025-07-06 DIAGNOSIS — I10 BENIGN ESSENTIAL HYPERTENSION: ICD-10-CM

## 2025-07-06 RX ORDER — HYDROCHLOROTHIAZIDE 25 MG/1
25 TABLET ORAL DAILY
Qty: 90 TABLET | Refills: 1 | Status: SHIPPED | OUTPATIENT
Start: 2025-07-06

## 2025-07-07 ENCOUNTER — OFFICE VISIT (OUTPATIENT)
Dept: PHYSICAL THERAPY | Facility: CLINIC | Age: 75
End: 2025-07-07
Attending: STUDENT IN AN ORGANIZED HEALTH CARE EDUCATION/TRAINING PROGRAM
Payer: MEDICARE

## 2025-07-07 DIAGNOSIS — M54.16 LUMBAR RADICULOPATHY: Primary | ICD-10-CM

## 2025-07-07 PROCEDURE — 97110 THERAPEUTIC EXERCISES: CPT

## 2025-07-07 PROCEDURE — 97140 MANUAL THERAPY 1/> REGIONS: CPT

## 2025-07-07 NOTE — PROGRESS NOTES
Daily Note     Today's date: 2025  Patient name: Shraddha Araiza  : 1950  MRN: [id number]  Referring provider: Anthony Kaufman MD  Dx:   Encounter Diagnosis     ICD-10-CM    1. Lumbar radiculopathy  M54.16                      Subjective: Pt reports she is starting to get the pain in her low back/butt again. She sat at a concert for about 2 hours yesterday and was going up and down stairs a lot this week putting things in the attic.       Objective: See treatment diary below      Assessment: Tolerated treatment well. Pt performed all exercises without issue, continue to progress to tolerance. Pt responded positively to STM, would benefit from continued focus on core stability and strengthening exercises. Patient demonstrated fatigue post treatment, exhibited good technique with therapeutic exercises, and would benefit from continued PT      Plan: Continue per plan of care.         POC Expires Auth Status Start Date Expiration Date PT Visit Limit           Date        Used        Remaining           Diagnosis: L Lumbar Radiculopathy   Precautions: na   Primary Goals: walking   *asterisks by exercise = given for HEP   Manuals      PA Lumbar NV JW Gr: II III JW Gr: II III  DO FOTO   Paraspinal massage  JW JW P! Right glute KM                            There Ex        QSET x10 x10      SLR x10 x10      DKTR  x30 x20 x10    GERBER with ROT  x10 x10     Bridge  x20 2x10 2x10    Deadbugs   x20     Double knee lift off supine   x12 x10    Pallof Press   7# x10 ea 7# x10x ea    Seated Lumbar Pball roll   15x all dir 20x all dir    Standing Pball    x15             Neuro Re-Ed        Supine Sciatic NERver glides NV 4x10      CAT COW   x10 x10                                                                            Patient education Diagnosis, prognosis, activity modification  Pelvic control with supine abdominal exercises      Re-evaluation              Ther Act                                          Modalities             Traction NV potentially

## 2025-07-10 ENCOUNTER — APPOINTMENT (OUTPATIENT)
Dept: PHYSICAL THERAPY | Facility: CLINIC | Age: 75
End: 2025-07-10
Attending: STUDENT IN AN ORGANIZED HEALTH CARE EDUCATION/TRAINING PROGRAM
Payer: MEDICARE

## 2025-07-14 ENCOUNTER — OFFICE VISIT (OUTPATIENT)
Dept: PHYSICAL THERAPY | Facility: CLINIC | Age: 75
End: 2025-07-14
Attending: STUDENT IN AN ORGANIZED HEALTH CARE EDUCATION/TRAINING PROGRAM
Payer: MEDICARE

## 2025-07-14 DIAGNOSIS — M54.16 LUMBAR RADICULOPATHY: Primary | ICD-10-CM

## 2025-07-14 PROCEDURE — 97110 THERAPEUTIC EXERCISES: CPT

## 2025-07-14 PROCEDURE — 97112 NEUROMUSCULAR REEDUCATION: CPT

## 2025-07-14 NOTE — PROGRESS NOTES
"Daily Note     Today's date: 2025  Patient name: Shraddha Araiza  : 1950  MRN: [id number]  Referring provider: Anthony Kaufman MD  Dx:   Encounter Diagnosis     ICD-10-CM    1. Lumbar radiculopathy  M54.16           Start Time: 1530  Stop Time: 1615  Total time in clinic (min): 45 minutes    Subjective: \"I have my pain across my upper back\" Reports pain with most movements today.      Objective: See treatment diary below      Assessment: Tolerated treatment fair. Generally tolerable with exercises today, however discomfort with most movements.  Gauge how she feels next time. Painful with paivsm, unresponsive with repeated extensions at lumbar or thoracic spine. Patient demonstrated fatigue post treatment and would benefit from continued PT      Plan: Continue per plan of care.         POC Expires Auth Status Start Date Expiration Date PT Visit Limit           Date        Used        Remaining           Diagnosis: L Lumbar Radiculopathy   Precautions: na   Primary Goals: walking   *asterisks by exercise = given for HEP   Manuals     PA Lumbar NV JW Gr: II III JW Gr: II III  DO FOTO   Paraspinal massage  JW JW P! Right glute KM                            There Ex        QSET x10 x10      SLR x10 x10      DKTR  x30 x20 x10    GERBER with ROT  x10 x10     Bridge  x20 2x10 2x10 x10   Deadbugs   x20  x10   Double knee lift off supine   x12 x10    Pallof Press   7# x10 ea 7# x10x ea    Seated Lumbar Pball roll   15x all dir 20x all dir    Standing Pball    x15     Open books     X10 ea   Standing L/s ext over table     x10   Clamshell     X10 ea P!           Neuro Re-Ed        Supine Sciatic NERver glides NV 4x10   x20   CAT COW   x10 x10    Leg press     40# 2x10                                                                   Patient education Diagnosis, prognosis, activity modification  Pelvic control with supine abdominal exercises      Re-evaluation              Ther Act              "                            Modalities             Traction NV potentially

## 2025-07-18 ENCOUNTER — OFFICE VISIT (OUTPATIENT)
Dept: PHYSICAL THERAPY | Facility: CLINIC | Age: 75
End: 2025-07-18
Attending: STUDENT IN AN ORGANIZED HEALTH CARE EDUCATION/TRAINING PROGRAM
Payer: MEDICARE

## 2025-07-18 DIAGNOSIS — M54.16 LUMBAR RADICULOPATHY: Primary | ICD-10-CM

## 2025-07-18 PROCEDURE — 97110 THERAPEUTIC EXERCISES: CPT

## 2025-07-18 PROCEDURE — 97112 NEUROMUSCULAR REEDUCATION: CPT

## 2025-07-18 PROCEDURE — 97140 MANUAL THERAPY 1/> REGIONS: CPT

## 2025-07-18 NOTE — PROGRESS NOTES
Daily Note     Today's date: 2025  Patient name: Shraddha Araiza  : 1950  MRN: [id number]  Referring provider: Anthony Kaufman MD  Dx:   Encounter Diagnosis     ICD-10-CM    1. Lumbar radiculopathy  M54.16           Start Time: 1300  Stop Time: 1345  Total time in clinic (min): 45 minutes    Subjective: Reports little less pain leaving therapy than when she began today.      Objective: See treatment diary below      Assessment: Tolerated treatment fair.  Assess repeated motions with lumbar spine today, limited with left side bending.  Admin stabilization and mobility training as outline below.  Minimal response, but slight reduction in pain with therapeutic exercises. Patient demonstrated fatigue post treatment, exhibited good technique with therapeutic exercises, and would benefit from continued PT      Plan: Continue per plan of care.         POC Expires Auth Status Start Date Expiration Date PT Visit Limit           Date        Used        Remaining           Diagnosis: L Lumbar Radiculopathy   Precautions: na   Primary Goals: walking   *asterisks by exercise = given for HEP   Manuals    PA Lumbar Grade I JW Gr: II III JW Gr: II III  DO FOTO   Paraspinal massage JW JW JW P! Right glute KM                            There Ex        QSET  x10      SLR  x10      DKTR  x30 x20 x10    GERBER with ROT  x10 x10     Bridge  x20 2x10 2x10 x10   Deadbugs   x20  x10   Double knee lift off supine   x12 x10    Pallof Press X10 8#  7# x10 ea 7# x10x ea    Seated Lumbar Pball roll Supine x20  15x all dir 20x all dir    Standing Pball  Abdmoninal pressing x20 all dir  x15     Open books     X10 ea   Standing L/s ext over table x20    x10   Clamshell     X10 ea P!   Pelvic fall in at wall (side bending) X12 ea side       Neuro Re-Ed        Supine Sciatic NERver glides NV 4x10   x20   CAT COW   x10 x10    Leg press     40# 2x10                                                                    Patient education Diagnosis, prognosis, activity modification  Pelvic control with supine abdominal exercises      Re-evaluation              Ther Act                                         Modalities             Traction

## 2025-07-21 ENCOUNTER — OFFICE VISIT (OUTPATIENT)
Dept: PAIN MEDICINE | Facility: CLINIC | Age: 75
End: 2025-07-21
Payer: MEDICARE

## 2025-07-21 ENCOUNTER — OFFICE VISIT (OUTPATIENT)
Dept: PHYSICAL THERAPY | Facility: CLINIC | Age: 75
End: 2025-07-21
Attending: STUDENT IN AN ORGANIZED HEALTH CARE EDUCATION/TRAINING PROGRAM
Payer: MEDICARE

## 2025-07-21 DIAGNOSIS — M54.16 LUMBAR RADICULOPATHY: Primary | ICD-10-CM

## 2025-07-21 DIAGNOSIS — M47.816 LUMBAR SPONDYLOSIS: ICD-10-CM

## 2025-07-21 DIAGNOSIS — M46.1 SACROILIITIS (HCC): Primary | ICD-10-CM

## 2025-07-21 DIAGNOSIS — G89.4 CHRONIC PAIN SYNDROME: ICD-10-CM

## 2025-07-21 PROCEDURE — 99214 OFFICE O/P EST MOD 30 MIN: CPT | Performed by: STUDENT IN AN ORGANIZED HEALTH CARE EDUCATION/TRAINING PROGRAM

## 2025-07-21 PROCEDURE — 97110 THERAPEUTIC EXERCISES: CPT

## 2025-07-21 PROCEDURE — 97112 NEUROMUSCULAR REEDUCATION: CPT

## 2025-07-21 PROCEDURE — G2211 COMPLEX E/M VISIT ADD ON: HCPCS | Performed by: STUDENT IN AN ORGANIZED HEALTH CARE EDUCATION/TRAINING PROGRAM

## 2025-07-21 RX ORDER — CELECOXIB 100 MG/1
100 CAPSULE ORAL 2 TIMES DAILY PRN
Qty: 60 CAPSULE | Refills: 0 | Status: SHIPPED | OUTPATIENT
Start: 2025-07-21 | End: 2025-08-20

## 2025-07-21 NOTE — PROGRESS NOTES
"Daily Note     Today's date: 2025  Patient name: Shraddha Araiza  : 1950  MRN: [id number]  Referring provider: Anthony Kaufman MD  Dx:   Encounter Diagnosis     ICD-10-CM    1. Lumbar radiculopathy  M54.16           Start Time: 1140  Stop Time: 1220  Total time in clinic (min): 40 minutes    Subjective: \"It feels like I'm back a square one now that the cortisone shot has weared off\"      Objective: See treatment diary below      Assessment: Tolerated treatment well. Per updated scrip and referring's request to apply modalities as needed; initiated EPAT shockwave for lumbar paraspinals.  Indifferent response with manuals and active range motions today.  Stenotic changes per recent MRI, focused on spinal flexion postures.  Patient demonstrated fatigue post treatment, exhibited good technique with therapeutic exercises, and would benefit from continued PT      Plan: Continue per plan of care.         POC Expires Auth Status Start Date Expiration Date PT Visit Limit           Date        Used        Remaining           Diagnosis: L Lumbar Radiculopathy   Precautions: na   Primary Goals: walking   *asterisks by exercise = given for HEP   Manuals    PA Lumbar Grade I JW Gr: II III JW Gr: II III  DO FOTO   Paraspinal massage JW JW JW P! Right glute KM                            There Ex        QSET        SLR        DKTR  x30 x20 x10    GERBER with ROT   x10     Bridge  x10 2x10 2x10 x10   Deadbugs   x20  x10   Double knee lift off supine  With Pillow adductor squeeze x10 x12 x10    Pallof Press X10 8#  7# x10 ea 7# x10x ea    Seated Lumbar Pball roll Supine x20  15x all dir 20x all dir    Standing Pball  Abdmoninal pressing x20 all dir  x15     Open books  X10 ea   X10 ea   Standing L/s ext over table x20    x10   Clamshell  X10 ea   X10 ea P!   Alt KTC with hand assist  X10 ea      Supine pball roll DKTC  x20      Pelvic fall in at wall (side bending) X12 ea side       Neuro Re-Ed   "      Supine Sciatic NERver aaliyah NV    x20   CAT COW   x10 x10    Leg press  50# x15   40# 2x10                                                                   Patient education Diagnosis, prognosis, activity modification  Pelvic control with supine abdominal exercises      Re-evaluation              Ther Act                                         Modalities             Traction         EPAT   High Lumbar paraspinal (Patient selected area)

## 2025-07-21 NOTE — PROGRESS NOTES
Name: Shradhda Araiza      : 1950      MRN: [id number]  Encounter Provider: Anthony Kaufman MD  Encounter Date: 2025   Encounter department: [facility]  :  Assessment & Plan  Sacroiliitis (HCC)    Orders:  •  celecoxib (CeleBREX) 100 mg capsule; Take 1 capsule (100 mg total) by mouth 2 (two) times a day as needed for mild pain or moderate pain  •  Ambulatory referral to Physical Therapy; Future    Lumbar spondylosis    Orders:  •  Ambulatory referral to Physical Therapy; Future    Chronic pain syndrome      Patient is a pleasant 74-year-old woman who presents as a follow-up visit after last being seen On 2025 for symptoms of lumbar spondylosis and lumbar radiculopathy.  At that time patient was referred to physical therapy and MRI of the lumbar spine was ordered.  MRI of lumbar spine from 2025 does demonstrate multilevel degenerative disc disease with some mild scoliosis, facet arthropathy at L3-L4, L4-L5 and L5-S1 along with ligamentum flavum hypertrophy at these levels.  Patient also with a moderate left foraminal stenosis at L5-S1 and mild bilateral foraminal narrowing at L4-L5. Imaging independently reviewed and discussed with patient.     Patient complaining mainly of symptoms of sacroilitis with exam findings consistent with this. For symptomatic relief will start celebrex 100 mg BID. Patient to decide if she wants to pursue the injection and contact the office.   Complete risks and benefits including bleeding, infection, tissue reaction, nerve injury and allergic reaction were discussed. The approach was demonstrated using models and literature was provided. Verbal and written consent was obtained.    My impressions and treatment recommendations were discussed in detail with the patient who verbalized understanding and had no further questions.  Discharge instructions were provided. I personally saw and examined the patient and I agree with the above discussed  plan of care.    History of Present Illness     Shraddha Araiza is a 74 y.o. female who presents for a follow up office visit in regards to No chief complaint on file.  Patient is a pleasant 74-year-old woman who presents as a follow-up visit after last being seen On 6/16/2025 for symptoms of lumbar spondylosis and lumbar radiculopathy.  At that time patient was referred to physical therapy and MRI of the lumbar spine was ordered.  MRI of lumbar spine from 7/5/2025 does demonstrate multilevel degenerative disc disease with some mild scoliosis, facet arthropathy at L3-L4, L4-L5 and L5-S1 along with ligamentum flavum hypertrophy at these levels.  Patient also with a moderate left foraminal stenosis at L5-S1 and mild bilateral foraminal narrowing at L4-L5. Imaging independently reviewed and discussed with patient.         Opioid contract date    Last UDS Date: No results in last 5 years                    last taken on    Review of Systems   Constitutional:  Negative for unexpected weight change.   HENT:  Negative for ear pain.    Eyes:  Negative for visual disturbance.   Respiratory:  Negative for shortness of breath and wheezing.    Gastrointestinal:  Negative for abdominal pain.   Musculoskeletal:  Positive for back pain and gait problem.        Decreased ROM, joint and muscle pain   Neurological:  Positive for weakness. Negative for numbness.   Psychiatric/Behavioral:  Positive for sleep disturbance. Negative for decreased concentration.        Medical History Reviewed by provider this encounter:  Tobacco  Allergies  Meds  Problems  Med Hx  Surg Hx  Fam Hx     .       Objective   There were no vitals taken for this visit.     Pain Score:   7  Physical Exam  Constitutional: normal, well developed, well nourished, alert, in no distress and non-toxic and no overt pain behavior.  Eyes: anicteric  HEENT: grossly intact  Neck: supple, symmetric, trachea midline and no masses   Pulmonary: even and  unlabored  Cardiovascular: No edema or pitting edema present  Skin: Normal without rashes or lesions and well hydrated  Psychiatric: Mood and affect appropriate  Neurologic: Cranial Nerves II-XII grossly intact  Musculoskeletal: normal gait. +PSIS, +Fortins finger. +GERBER

## 2025-07-22 ENCOUNTER — OFFICE VISIT (OUTPATIENT)
Dept: PHYSICAL THERAPY | Facility: CLINIC | Age: 75
End: 2025-07-22
Attending: STUDENT IN AN ORGANIZED HEALTH CARE EDUCATION/TRAINING PROGRAM
Payer: MEDICARE

## 2025-07-22 DIAGNOSIS — M54.16 LUMBAR RADICULOPATHY: Primary | ICD-10-CM

## 2025-07-22 PROCEDURE — 97110 THERAPEUTIC EXERCISES: CPT

## 2025-07-22 PROCEDURE — 97140 MANUAL THERAPY 1/> REGIONS: CPT

## 2025-07-22 PROCEDURE — 97112 NEUROMUSCULAR REEDUCATION: CPT

## 2025-07-22 NOTE — PROGRESS NOTES
Daily Note     Today's date: 2025  Patient name: Shraddha Araiza  : 1950  MRN: [id number]  Referring provider: Anthony Kaufman MD  Dx:   Encounter Diagnosis     ICD-10-CM    1. Lumbar radiculopathy  M54.16                      Subjective: Patient feels pain the right side low flank today, opposite of last session.      Objective: See treatment diary below      Assessment: Tolerated treatment well. Patient demonstrated fatigue post treatment, exhibited good technique with therapeutic exercises, and would benefit from continued PT      Plan: Continue per plan of care.         POC Expires Auth Status Start Date Expiration Date PT Visit Limit           Date        Used        Remaining           Diagnosis: L Lumbar Radiculopathy   Precautions: na   Primary Goals: walking   *asterisks by exercise = given for HEP   Manuals    PA Lumbar Grade I JW Gr: II III JW Gr: II III  DO FOTO   Paraspinal massage JW JW JW  KM                            There Ex        QSET        SLR        DKTR  x30 x20 x10    GERBER with ROT   x10     Bridge  x10 2x10 2x10 x10   Deadbugs   x20  x10   Double knee lift off supine  With Pillow adductor squeeze x10 x12 x10    Pallof Press X10 8#  7# x10 ea 7# x10x ea    Seated Lumbar Pball roll Supine x20  15x all dir 20x all dir    Standing Pball  Abdmoninal pressing x20 all dir  x15     Open books  X10 ea   X10 ea   Standing L/s ext over table x20    x10   Clamshell  X10 ea   X10 ea P!   Alt KTC with hand assist  X10 ea      Supine pball roll DKTC  x20      Pelvic fall in at wall (side bending) X12 ea side       Neuro Re-Ed        Supine Sciatic NERver glides NV    x20   CAT COW   x10 x10    Leg press  50# x15   40# 2x10   R piriformis stretch   x20                                                             Patient education Diagnosis, prognosis, activity modification        Re-evaluation              Ther Act                                         Modalities              Traction         EPAT   High Lumbar paraspinal (Patient selected area)  JW

## 2025-08-05 ENCOUNTER — OFFICE VISIT (OUTPATIENT)
Dept: PHYSICAL THERAPY | Facility: CLINIC | Age: 75
End: 2025-08-05
Attending: STUDENT IN AN ORGANIZED HEALTH CARE EDUCATION/TRAINING PROGRAM
Payer: MEDICARE

## 2025-08-05 DIAGNOSIS — M54.16 LUMBAR RADICULOPATHY: Primary | ICD-10-CM

## 2025-08-05 PROCEDURE — 97110 THERAPEUTIC EXERCISES: CPT

## 2025-08-05 PROCEDURE — 97112 NEUROMUSCULAR REEDUCATION: CPT

## 2025-08-07 ENCOUNTER — OFFICE VISIT (OUTPATIENT)
Dept: PHYSICAL THERAPY | Facility: CLINIC | Age: 75
End: 2025-08-07
Attending: STUDENT IN AN ORGANIZED HEALTH CARE EDUCATION/TRAINING PROGRAM
Payer: MEDICARE

## 2025-08-07 DIAGNOSIS — M54.16 LUMBAR RADICULOPATHY: Primary | ICD-10-CM

## 2025-08-07 PROCEDURE — 97110 THERAPEUTIC EXERCISES: CPT

## 2025-08-07 PROCEDURE — 97112 NEUROMUSCULAR REEDUCATION: CPT

## 2025-08-07 PROCEDURE — 97140 MANUAL THERAPY 1/> REGIONS: CPT

## 2025-08-11 ENCOUNTER — OFFICE VISIT (OUTPATIENT)
Dept: PHYSICAL THERAPY | Facility: CLINIC | Age: 75
End: 2025-08-11
Attending: STUDENT IN AN ORGANIZED HEALTH CARE EDUCATION/TRAINING PROGRAM
Payer: MEDICARE

## 2025-08-14 ENCOUNTER — OFFICE VISIT (OUTPATIENT)
Dept: PHYSICAL THERAPY | Facility: CLINIC | Age: 75
End: 2025-08-14
Attending: STUDENT IN AN ORGANIZED HEALTH CARE EDUCATION/TRAINING PROGRAM
Payer: MEDICARE

## 2025-08-17 DIAGNOSIS — M46.1 SACROILIITIS (HCC): ICD-10-CM

## 2025-08-18 RX ORDER — CELECOXIB 100 MG/1
100 CAPSULE ORAL 2 TIMES DAILY PRN
Qty: 60 CAPSULE | Refills: 0 | Status: SHIPPED | OUTPATIENT
Start: 2025-08-18 | End: 2025-09-17

## 2025-08-19 ENCOUNTER — OFFICE VISIT (OUTPATIENT)
Dept: PHYSICAL THERAPY | Facility: CLINIC | Age: 75
End: 2025-08-19
Attending: STUDENT IN AN ORGANIZED HEALTH CARE EDUCATION/TRAINING PROGRAM
Payer: MEDICARE

## 2025-08-19 DIAGNOSIS — M54.16 LUMBAR RADICULOPATHY: Primary | ICD-10-CM

## 2025-08-19 PROCEDURE — 97140 MANUAL THERAPY 1/> REGIONS: CPT

## 2025-08-19 PROCEDURE — 97112 NEUROMUSCULAR REEDUCATION: CPT

## 2025-08-19 PROCEDURE — 97110 THERAPEUTIC EXERCISES: CPT

## 2025-08-22 ENCOUNTER — OFFICE VISIT (OUTPATIENT)
Dept: PHYSICAL THERAPY | Facility: CLINIC | Age: 75
End: 2025-08-22
Attending: STUDENT IN AN ORGANIZED HEALTH CARE EDUCATION/TRAINING PROGRAM
Payer: MEDICARE

## 2025-08-22 DIAGNOSIS — M54.16 LUMBAR RADICULOPATHY: Primary | ICD-10-CM

## 2025-08-22 PROCEDURE — 97110 THERAPEUTIC EXERCISES: CPT

## (undated) DEVICE — SINGLE-USE BIOPSY FORCEPS: Brand: RADIAL JAW 4

## (undated) DEVICE — THE EXACTO COLD SNARE IS INTENDED TO BE USED WITHOUT DIATHERMIC ENERGY FOR THE ENDOSCOPIC RESECTION OF POLYP TISSUE IN THE GASTROINTESTINAL TRACT.: Brand: EXACTO